# Patient Record
Sex: MALE | Race: WHITE | NOT HISPANIC OR LATINO | Employment: FULL TIME | URBAN - METROPOLITAN AREA
[De-identification: names, ages, dates, MRNs, and addresses within clinical notes are randomized per-mention and may not be internally consistent; named-entity substitution may affect disease eponyms.]

---

## 2017-04-04 NOTE — TELEPHONE ENCOUNTER
Received refill req for Simvastatin 40 mg QD. Per last OV note, pt was to change to Lipitor. Left msg for pt to call to clarify which med he is taking  Pc from pt. He states he is on the Lipitor and refilled it about 2 weeks ago. He will call the drug store to have them remove the Zocor from his med list but will also fax a msg to them myself.

## 2017-06-19 DIAGNOSIS — E03.9 ACQUIRED HYPOTHYROIDISM: ICD-10-CM

## 2017-06-20 NOTE — TELEPHONE ENCOUNTER
LEVOTHYROXINE 100 MCG  MCG TAB    Last Written Prescription Date: 09/20/2016  Last Quantity: 90, # refills: 3  Last Office Visit with [facility] [place] [place], [facility] or [facility] prescribing provider: 09/20/2016   Next 5 appointments (look out 90 days)     Aug 24, 2017  7:45 AM CDT   Return Visit with Jorge Luis Westbrook MD   [facility] ([facility])    [address] [place] [id number] [phone]                   TSH   Date Value Ref Range Status   09/20/2016 1.07 0.40 - 5.00 mU/L Final

## 2017-06-21 DIAGNOSIS — E78.00 HYPERCHOLESTEROLEMIA: ICD-10-CM

## 2017-06-21 RX ORDER — LEVOTHYROXINE SODIUM 100 UG/1
TABLET ORAL
Qty: 90 TABLET | Refills: 0 | Status: SHIPPED | OUTPATIENT
Start: 2017-06-21 | End: 2017-12-16

## 2017-06-21 RX ORDER — ATORVASTATIN CALCIUM 40 MG/1
40 TABLET, FILM COATED ORAL DAILY
Qty: 90 TABLET | Refills: 0 | Status: SHIPPED | OUTPATIENT
Start: 2017-06-21 | End: 2017-08-30

## 2017-08-02 ENCOUNTER — OFFICE VISIT (OUTPATIENT)
Dept: FAMILY MEDICINE | Facility: CLINIC | Age: 72
End: 2017-08-02
Payer: COMMERCIAL

## 2017-08-02 VITALS
RESPIRATION RATE: 20 BRPM | TEMPERATURE: 97.2 F | HEART RATE: 62 BPM | OXYGEN SATURATION: 94 % | HEIGHT: 67 IN | BODY MASS INDEX: 30.06 KG/M2 | DIASTOLIC BLOOD PRESSURE: 68 MMHG | SYSTOLIC BLOOD PRESSURE: 96 MMHG | WEIGHT: 191.5 LBS

## 2017-08-02 DIAGNOSIS — H61.23 BILATERAL IMPACTED CERUMEN: Primary | ICD-10-CM

## 2017-08-02 DIAGNOSIS — Z23 ENCOUNTER FOR IMMUNIZATION: ICD-10-CM

## 2017-08-02 PROCEDURE — 90670 PCV13 VACCINE IM: CPT | Performed by: FAMILY MEDICINE

## 2017-08-02 PROCEDURE — G0009 ADMIN PNEUMOCOCCAL VACCINE: HCPCS | Performed by: FAMILY MEDICINE

## 2017-08-02 PROCEDURE — 99213 OFFICE O/P EST LOW 20 MIN: CPT | Mod: 25 | Performed by: FAMILY MEDICINE

## 2017-08-02 PROCEDURE — 69210 REMOVE IMPACTED EAR WAX UNI: CPT | Performed by: FAMILY MEDICINE

## 2017-08-02 NOTE — PROGRESS NOTES
"  SUBJECTIVE:                                                    Jonathon Guerra is a 71 year old male who presents to clinic today for the following health issues:      Pt is also requesting Pneumona shot \"booster\".  Has appointment at travel clinic 8/14/17 for additional vaccines-traveling to [place].    Ear problem      Duration: 2-3 weeks    Description (location/character/radiation): LT ear felt full of fluid but as since improved. Would like ears checked.    Intensity:  mild    Accompanying signs and symptoms: Runny nose    History (similar episodes/previous evaluation): None    Precipitating or alleviating factors: None    Therapies tried and outcome: Neti-Pot   Pt has had long standing recurrent wax problems for both ears      Immunization records show that he has had PCV 23 10/28/14, above age of 67  He has not previously had PCV 13 vaccine    Problem list and histories reviewed & adjusted, as indicated.  Additional history: as documented    Labs reviewed in EPIC    Reviewed and updated as needed this visit by clinical staff     Reviewed and updated as needed this visit by Provider         ROS:  CONSTITUTIONAL:NEGATIVE for fever, chills, change in weight  ENT/MOUTH: POSITIVE for earache bilateral and hearing loss    OBJECTIVE:                                                    BP 96/68 (BP Location: Left arm, Patient Position: Chair, Cuff Size: Adult Regular)  Pulse 62  Temp 97.2  F (36.2  C) (Tympanic)  Resp 20  Ht 5' 7.24\" (1.708 m)  Wt 191 lb 8 oz (86.9 kg)  SpO2 94%  BMI 29.78 kg/m2  Body mass index is 29.78 kg/(m^2).  GENERAL APPEARANCE: healthy, alert and no distress  HENT: nose and mouth without ulcers or lesions, TM's pearly grey, normal light reflex bilateral and external ear canal occluded with wax bilateral  RESP: lungs clear to auscultation - no rales, rhonchi or wheezes    Rt ear wax removed with cerumen loop  Lt part of wax removed with cerumen loop but remainder flushed out with ear " irrigation  Small abrasion distal ear canal noted from use of cerumen loop.  No active bleeding    Diagnostic test results:  none        ASSESSMENT/PLAN:                                                        ICD-10-CM    1. Bilateral impacted cerumen H61.23 REMOVE IMPACTED CERUMEN   2. Encounter for immunization Z23 Pneumococcal vaccine 13 valent PCV13 IM (Prevnar) [73637]     ADMIN: Vaccine, Initial (34603)       Follow up with Provider - as needed  No need for repeat PCV 23 at this time, but will give vaccine PCV 13 today  Pt will keep appt with Travel clinic to see what other vaccines or medications he would need   Patient Instructions   Use generic Debrox ear drops twice weekly       Zackary Heath MD  [facility]

## 2017-08-02 NOTE — NURSING NOTE
"Chief Complaint   Patient presents with     Ear Problem       Initial BP 96/68 (BP Location: Left arm, Patient Position: Chair, Cuff Size: Adult Regular)  Pulse 62  Temp 97.2  F (36.2  C) (Tympanic)  Resp 20  Ht 5' 7.24\" (1.708 m)  Wt 191 lb 8 oz (86.9 kg)  SpO2 94%  BMI 29.78 kg/m2 Estimated body mass index is 29.78 kg/(m^2) as calculated from the following:    Height as of this encounter: 5' 7.24\" (1.708 m).    Weight as of this encounter: 191 lb 8 oz (86.9 kg).  Medication Reconciliation: complete     Princess VANESSA Andrade CMA      "

## 2017-08-02 NOTE — MR AVS SNAPSHOT
After Visit Summary   8/2/2017    Jonathon Guerra    MRN: [id number]           Patient Information     Date Of Birth          1945        Visit Information        Provider Department      8/2/2017 2:30 PM Zackary Heath MD [facility]        Today's Diagnoses     Bilateral impacted cerumen    -  1    Encounter for immunization          Care Instructions    Use generic Debrox ear drops twice weekly           Follow-ups after your visit        Your next 10 appointments already scheduled     Aug 14, 2017  3:00 PM CDT   Office Visit with MATHEW Harvey CNP   [facility] ([facility])    [address]  [address]  [facility] [address] [phone]           Bring a current list of meds and any records pertaining to this visit. For Physicals, please bring immunization records and any forms needing to be filled out. Please arrive 10 minutes early to complete paperwork.            Aug 17, 2017  8:00 AM CDT   LAB with [facility]   [facility] [place] ([facility])    [address]  [facility] [address] [phone]           Patient must bring picture ID. Patient should be prepared to give a urine specimen  Please do not eat 10-12 hours before your appointment if you are coming in fasting for labs on lipids, cholesterol, or glucose (sugar). Pregnant women should follow their Care Team instructions. Water with medications is okay. Do not drink coffee or other fluids. If you have concerns about taking  your medications, please ask at office or if scheduling via [employer], send a message by clicking on Secure Messaging, Message Your Care Team.            Aug 17, 2017  8:30 AM CDT   Ech Stress Test with [id number]   [facility] CV Echocardiography (Cardiovascular [facility] at [facility])    [address]  [address]  [facility] [address]    [phone]           1. Please bring or wear a comfortable two-piece outfit and walking shoes. 2. Stop eating 3 hours before the test. You may drink water or juice. 3. Stop all caffeine 12 hours before the test. This includes coffee, tea, soda pop, chocolate and certain medicines (such as Anacin and Excederin). Also avoid decaf coffee and tea, as these contain small amounts of caffeine. 4. No alcohol, smoking or use of other tobacco products for 12 hours before the test. 5. Refer to your provider instructions to see if you need to stop any medications (such as beta-blockers or nitrates) for this test. 6. For patients with diabetes: - If you take insulin, call your diabetes care team. Ask if you should take a   dose the morning of your test. - If you take diabetes medicine by mouth, dont take it on the morning of your test. Bring it with you to take after the test. (If you have questions, call your diabetes care team) 7. When you arrive, please tell us if: - You have diabetes. - You have taken Viagra, Cialis or Levitra in the past 48 hours. 8. For any questions that cannot be answered, please contact the ordering physician            Aug 30, 2017  8:15 AM CDT   Return Visit with Jorge Luis Westbrook MD   [facility] [place] ([address] [address] [phone]              Who to contact     If you have questions or need follow up information about today's clinic visit or your schedule please contact [facility] directly at [phone].  Normal or non-critical lab and imaging results will be communicated to you by MyChart, letter or phone within 4 business days after the clinic has received the results. If you do not hear from us within 7 days, please contact the clinic through MyChart or phone. If you have a critical or abnormal lab result, we will notify you by phone as soon as  "possible.  Submit refill requests through [employer] or call your pharmacy and they will forward the refill request to us. Please allow 3 business days for your refill to be completed.          Additional Information About Your Visit        [employer] Information     [employer] gives you secure access to your electronic health record. If you see a primary care provider, you can also send messages to your care team and make appointments. If you have questions, please call your primary care clinic.  If you do not have a primary care provider, please call [phone] and they will assist you.        Care EveryWhere ID     This is your Care EveryWhere ID. This could be used by other organizations to access your [facility] medical records  [id number]        Your Vitals Were     Pulse Temperature Respirations Height Pulse Oximetry BMI (Body Mass Index)    62 97.2  F (36.2  C) (Tympanic) 20 5' 7.24\" (1.708 m) 94% 29.78 kg/m2       Blood Pressure from Last 3 Encounters:   08/02/17 96/68   09/20/16 102/72   06/29/16 112/80    Weight from Last 3 Encounters:   08/02/17 191 lb 8 oz (86.9 kg)   09/20/16 203 lb (92.1 kg)   06/29/16 203 lb (92.1 kg)              We Performed the Following     ADMIN: Vaccine, Initial (24123)     Pneumococcal vaccine 13 valent PCV13 IM (Prevnar) [37023]     REMOVE IMPACTED CERUMEN        Primary Care Provider Office Phone # Fax #    Perez Keeley Beckett [phone]       [facility] [address] [address] [facility] [address]        Equal Access to Services     [facility] : Hadii fracisco ku hadasho Sojulietteali, waaxda luqadaha, qaybta kaalmada alexsander zavala. So [facility] [phone].    ATENCIÓN: Si habla español, tiene a schwarz disposición servicios gratuitos de asistencia lingüística. Quintoname al [phone].    We comply with applicable federal civil rights laws and [place] laws. We do not discriminate on the basis of race, color, national origin, " age, disability sex, sexual orientation or gender identity.            Thank you!     Thank you for choosing [facility]  for your care. Our goal is always to provide you with excellent care. Hearing back from our patients is one way we can continue to improve our services. Please take a few minutes to complete the written survey that you may receive in the mail after your visit with us. Thank you!             Your Updated Medication List - Protect others around you: Learn how to safely use, store and throw away your medicines at www.disposemymeds.org.          This list is accurate as of: 8/2/17  2:57 PM.  Always use your most recent med list.                   Brand Name Dispense Instructions for use Diagnosis    acetaminophen 650 MG 8 hour tablet      Take by mouth daily        ACETAMINOPHEN PM PO      Take 500 mg by mouth        aspirin 325 MG tablet      Take 325 mg by mouth daily.        atorvastatin 40 MG tablet    LIPITOR    90 tablet    Take 1 tablet (40 mg) by mouth daily    Hypercholesterolemia       cholecalciferol 1000 UNIT tablet    vitamin D     Take 1 tablet by mouth daily        ciclopirox 0.77 % cream    LOPROX          ketoconazole 2 % cream    NIZORAL    15 g    APPLY TOPICALLY 2 TIMES DAILY    Tinea corporis       levothyroxine 100 MCG tablet    SYNTHROID/LEVOTHROID    90 tablet    TAKE ONE TABLET BY MOUTH DAILY.    Acquired hypothyroidism       MELATONIN PO      Take 10 mg by mouth        metoprolol 25 MG 24 hr tablet    TOPROL-XL     1/2 tablet daily        MULTI-VITAMIN PO      Take by mouth daily        NITROSTAT SL      Place 0.4 mg under the tongue.        sotalol 80 MG tablet    BETAPACE     Take 1 tablet by mouth 2 times daily.        triamcinolone 0.5 % cream    KENALOG    30 g    Apply sparingly to affected area three times daily.    Rash

## 2017-08-02 NOTE — NURSING NOTE
Screening Questionnaire for Adult Immunization    Are you sick today?   No   Do you have allergies to medications, food, a vaccine component or latex?   No   Have you ever had a serious reaction after receiving a vaccination?   No   Do you have a long-term health problem with heart disease, lung disease, asthma, kidney disease, metabolic disease (e.g. diabetes), anemia, or other blood disorder?   Yes   Do you have cancer, leukemia, HIV/AIDS, or any other immune system problem?   No   In the past 3 months, have you taken medications that affect  your immune system, such as prednisone, other steroids, or anticancer drugs; drugs for the treatment of rheumatoid arthritis, Crohn s disease, or psoriasis; or have you had radiation treatments?   No   Have you had a seizure, or a brain or other nervous system problem?   No   During the past year, have you received a transfusion of blood or blood     products, or been given immune (gamma) globulin or antiviral drug?   No   For women: Are you pregnant or is there a chance you could become        pregnant during the next month?   No   Have you received any vaccinations in the past 4 weeks?   No     Immunization questionnaire was positive for at least one answer.  Notified Dr Heath.      MNVFC doesn't apply on this patient    Per orders of Dr. Heath, injection of PCV 13 given by Princess VANESSA Andrade. Patient instructed to remain in clinic for 15 minutes afterwards, and to report any adverse reaction to me immediately.       Screening performed by Princess VANESSA Andrade on 8/2/2017 at 3:11 PM.

## 2017-08-14 ENCOUNTER — OFFICE VISIT (OUTPATIENT)
Dept: FAMILY MEDICINE | Facility: CLINIC | Age: 72
End: 2017-08-14
Payer: COMMERCIAL

## 2017-08-14 VITALS — TEMPERATURE: 97.3 F | DIASTOLIC BLOOD PRESSURE: 72 MMHG | HEART RATE: 60 BPM | SYSTOLIC BLOOD PRESSURE: 107 MMHG

## 2017-08-14 DIAGNOSIS — Z71.84 TRAVEL ADVICE ENCOUNTER: Primary | ICD-10-CM

## 2017-08-14 DIAGNOSIS — Z23 NEED FOR VACCINATION: ICD-10-CM

## 2017-08-14 PROCEDURE — 90675 RABIES VACCINE IM: CPT | Mod: GA | Performed by: NURSE PRACTITIONER

## 2017-08-14 PROCEDURE — 99402 PREV MED CNSL INDIV APPRX 30: CPT | Mod: 25 | Performed by: NURSE PRACTITIONER

## 2017-08-14 PROCEDURE — 86708 HEPATITIS A ANTIBODY: CPT | Performed by: NURSE PRACTITIONER

## 2017-08-14 PROCEDURE — 86706 HEP B SURFACE ANTIBODY: CPT | Performed by: NURSE PRACTITIONER

## 2017-08-14 PROCEDURE — 86765 RUBEOLA ANTIBODY: CPT | Performed by: NURSE PRACTITIONER

## 2017-08-14 PROCEDURE — 86735 MUMPS ANTIBODY: CPT | Performed by: NURSE PRACTITIONER

## 2017-08-14 PROCEDURE — 36415 COLL VENOUS BLD VENIPUNCTURE: CPT | Performed by: NURSE PRACTITIONER

## 2017-08-14 PROCEDURE — 90472 IMMUNIZATION ADMIN EACH ADD: CPT | Mod: GA | Performed by: NURSE PRACTITIONER

## 2017-08-14 PROCEDURE — 90691 TYPHOID VACCINE IM: CPT | Mod: GA | Performed by: NURSE PRACTITIONER

## 2017-08-14 PROCEDURE — 90471 IMMUNIZATION ADMIN: CPT | Mod: GA | Performed by: NURSE PRACTITIONER

## 2017-08-14 PROCEDURE — 86762 RUBELLA ANTIBODY: CPT | Performed by: NURSE PRACTITIONER

## 2017-08-14 NOTE — PATIENT INSTRUCTIONS
Today August 14, 2017 you received the    Rabies Vaccine - Please return on 8/21/17 for your 2nd dose and 9/4/2017 or 09/11/2017  for your 3rd and final dose.    Typhoid - injectable. This vaccine is valid for two years.   .    These appointments can be made as a NURSE ONLY visit.    **It is very important for the vaccinations to be given on the scheduled day(s), this helps ensure you receive the full effectiveness of the vaccine.**    Please call [facility] with any questions [phone]    Thank you for visiting [facility]

## 2017-08-14 NOTE — PROGRESS NOTES
Nurse Note      Itinerary:  [place] and [place]      Departure Date: 10/06/2017       Return Date: 10/27/2017      Length of Trip 3 weeks      Reason for Travel: Tourism           Urban or rural: both      Accommodations: Hotel/Airbnb        IMMUNIZATION HISTORY  Have you received any immunizations within the past 4 weeks?  Yes  Have you ever fainted from having your blood drawn or from an injection?  No  Have you ever had a fever reaction to vaccination?  Yes  Have you ever had any bad reaction or side effect from any vaccination?  No  Have you ever had hepatitis A or B vaccine?  Yes  Do you live (or work closely) with anyone who has AIDS, an AIDS-like condition, any other immune disorder or who is on chemotherapy for cancer?  No  Do you have a family history of immunodeficiency?  No  Have you received any injection of immune globulin or any blood products during the past 12 months?  No    Patient roomed by GRACIA Aguilera Ly Guerra is a 72 year old male seen today alone for counsultation for international travel to [facility] for Tourism.  Patient will be departing in  6 week(s) and staying for   3 week(s) and  traveling friends.      Patient itinerary :  will be in the urban region of [facility], [facility] and [place] which presents risk for Dengue Fever, Chikungungya, Zika, Schistosomiasis, Japanese Encephalitis, Rabies, food borne illnesses, motor vehicle accidents and Typhoid. exposure.      Patient's activities will include sightseeing and visiting friends     Patient's country of birth is [place]    Special medical concerns: afib, reflux, coronary arteru disease  Pre-travel questionnaire was completed by patient and reviewed by provider.     Vitals: /72  Pulse 60  Temp 97.3  F (36.3  C) (Oral)  BMI= There is no height or weight on file to calculate BMI.    EXAM:  General:  Well-nourished, well-developed in no acute distress.  Appears to be stated age, interacts  appropriately and expresses understanding of information given to patient.    Current Outpatient Prescriptions   Medication Sig Dispense Refill     levothyroxine (SYNTHROID/LEVOTHROID) 100 MCG tablet TAKE ONE TABLET BY MOUTH DAILY. 90 tablet 0     atorvastatin (LIPITOR) 40 MG tablet Take 1 tablet (40 mg) by mouth daily 90 tablet 0     metoprolol (TOPROL-XL) 25 MG 24 hr tablet 1/2 tablet daily       aspirin 325 MG tablet Take 325 mg by mouth daily.       sotalol (BETAPACE) 80 MG tablet Take 1 tablet by mouth 2 times daily.       ciclopirox (LOPROX) 0.77 % cream        MELATONIN PO Take 10 mg by mouth        Diphenhydramine-APAP, sleep, (ACETAMINOPHEN PM PO) Take 500 mg by mouth       ketoconazole (NIZORAL) 2 % cream APPLY TOPICALLY 2 TIMES DAILY 15 g 0     Acetaminophen 650 MG TABS Take by mouth daily       triamcinolone (KENALOG) 0.5 % cream Apply sparingly to affected area three times daily. 30 g 5     Multiple Vitamin (MULTI-VITAMIN PO) Take by mouth daily        Nitroglycerin (NITROSTAT SL) Place 0.4 mg under the tongue.       cholecalciferol (VITAMIN D3) 1000 UNIT tablet Take 1 tablet by mouth daily        Patient Active Problem List   Diagnosis     Elevated prostate specific antigen (PSA)     Hypothyroidism     Atrial fibrillation (H)     Insomnia     Esophageal reflux     Hyperlipidemia     Impotence of organic origin     Urticaria     Arthropathy, lower leg     Advance Care Planning     Hyperlipidemia     Hypertension     Coronary artery disease     Tinea corporis     Pure hypercholesterolemia     Allergies   Allergen Reactions     Atorvastatin      Lipitor--muscle aches         Immunizations discussed include:   Hepatitis A:  Ordered/given today, risks, benefits and side effects reviewed  Hepatitis B: Declined  Not concerned about risk of disease  Influenza: vaccine is not available  Typhoid: Ordered/given today, risks, benefits and side effects reviewed  Rabies: Ordered/given today, risks, benefits and side  effects reviewed  Yellow Fever: Not indicated  Japanese Encephalitis: Not indicated  Meningococcus: Not indicated  Tetanus/Diphtheria: Up to date  Measles/Mumps/Rubella: Titers drawn  Cholera: Not needed  Polio: Up to date  Pneumococcal: Up to date  Varicella: Immune by disease history per patient report  Zostavax:  declined  HPV:  Not indicated  TB:  Low risk     Altitude Exposure on this trip: no  Past tolerance to Altitude: NA    ASSESSMENT/PLAN:    ICD-10-CM    1. Travel advice encounter Z71.89 Hepatitis Antibody A IgG     Hepatitis B Surface Antibody     Rubella Antibody IgG Quantitative     Rubeola Antibody IgG     Mumps Antibody IgG     TYPHOID VACCINE, IM     RABIES VACCINE, IM     RABIES VACCINE, IM   2. Need for vaccination Z23 Hepatitis Antibody A IgG     Hepatitis B Surface Antibody     Rubella Antibody IgG Quantitative     Rubeola Antibody IgG     Mumps Antibody IgG     TYPHOID VACCINE, IM     RABIES VACCINE, IM     RABIES VACCINE, IM     I have reviewed general recommendations for safe travel   including: food/water precautions, insect precautions, safer sex   practices given high prevalence of Zika, HIV and other STDs,   roadway safety. Educational materials and [employer] report provided.    Malaraia prophylaxis recommended: none  Symptomatic treatment for traveler's diarrhea: loperamide/diphenoxylate      Evacuation insurance advised and resources were provided to patient.    Total visit time 30 minutes  with over 50% of time spent counseling patient as detailed above.    Lorna Quiles CNP

## 2017-08-15 LAB
HAV IGG SER QL IA: REACTIVE
HBV SURFACE AB SERPL IA-ACNC: 0.82 M[IU]/ML

## 2017-08-16 DIAGNOSIS — Z71.84 TRAVEL ADVICE ENCOUNTER: ICD-10-CM

## 2017-08-16 DIAGNOSIS — Z23 NEED FOR VACCINATION: Primary | ICD-10-CM

## 2017-08-16 LAB
MEV IGG SER QL IA: 4.4 AI (ref 0–0.8)
MUV IGG SER QL IA: 5.2 AI (ref 0–0.8)
RUBV IGG SERPL IA-ACNC: 21 IU/ML

## 2017-08-17 ENCOUNTER — HOSPITAL ENCOUNTER (OUTPATIENT)
Dept: CARDIOLOGY | Facility: CLINIC | Age: 72
Discharge: HOME | End: 2017-08-17
Attending: INTERNAL MEDICINE | Admitting: INTERNAL MEDICINE
Payer: COMMERCIAL

## 2017-08-17 DIAGNOSIS — I25.10 ATHEROSCLEROSIS OF NATIVE CORONARY ARTERY OF NATIVE HEART WITHOUT ANGINA PECTORIS: ICD-10-CM

## 2017-08-17 DIAGNOSIS — E78.00 HYPERCHOLESTEROLEMIA: ICD-10-CM

## 2017-08-17 LAB
ALT SERPL W P-5'-P-CCNC: <5 U/L (ref 5–30)
CHOLEST SERPL-MCNC: 151 MG/DL
HDLC SERPL-MCNC: 50 MG/DL
LDLC SERPL CALC-MCNC: 85 MG/DL
NONHDLC SERPL-MCNC: 101 MG/DL
TRIGL SERPL-MCNC: 80 MG/DL

## 2017-08-17 PROCEDURE — 93321 DOPPLER ECHO F-UP/LMTD STD: CPT | Mod: 26 | Performed by: INTERNAL MEDICINE

## 2017-08-17 PROCEDURE — 36415 COLL VENOUS BLD VENIPUNCTURE: CPT | Performed by: INTERNAL MEDICINE

## 2017-08-17 PROCEDURE — 84460 ALANINE AMINO (ALT) (SGPT): CPT | Performed by: INTERNAL MEDICINE

## 2017-08-17 PROCEDURE — 93016 CV STRESS TEST SUPVJ ONLY: CPT | Performed by: INTERNAL MEDICINE

## 2017-08-17 PROCEDURE — 25500064 ZZH RX 255 OP 636: Performed by: INTERNAL MEDICINE

## 2017-08-17 PROCEDURE — 40000264 ECHO STRESS WITH OPTISON

## 2017-08-17 PROCEDURE — 80061 LIPID PANEL: CPT | Performed by: INTERNAL MEDICINE

## 2017-08-17 PROCEDURE — 93350 STRESS TTE ONLY: CPT | Mod: 26 | Performed by: INTERNAL MEDICINE

## 2017-08-17 PROCEDURE — 93325 DOPPLER ECHO COLOR FLOW MAPG: CPT | Mod: 26 | Performed by: INTERNAL MEDICINE

## 2017-08-17 PROCEDURE — 93018 CV STRESS TEST I&R ONLY: CPT | Performed by: INTERNAL MEDICINE

## 2017-08-17 RX ADMIN — HUMAN ALBUMIN MICROSPHERES AND PERFLUTREN 3 ML: 10; .22 INJECTION, SOLUTION INTRAVENOUS at 09:19

## 2017-08-21 ENCOUNTER — ALLIED HEALTH/NURSE VISIT (OUTPATIENT)
Dept: NURSING | Facility: CLINIC | Age: 72
End: 2017-08-21
Payer: COMMERCIAL

## 2017-08-21 DIAGNOSIS — Z71.84 TRAVEL ADVICE ENCOUNTER: ICD-10-CM

## 2017-08-21 DIAGNOSIS — Z23 NEED FOR VACCINATION: ICD-10-CM

## 2017-08-21 PROCEDURE — 90675 RABIES VACCINE IM: CPT | Mod: GA

## 2017-08-21 PROCEDURE — 90471 IMMUNIZATION ADMIN: CPT | Mod: GA

## 2017-08-21 PROCEDURE — 90472 IMMUNIZATION ADMIN EACH ADD: CPT | Mod: GA

## 2017-08-21 PROCEDURE — 99207 ZZC NO CHARGE NURSE ONLY: CPT

## 2017-08-21 PROCEDURE — 90746 HEPB VACCINE 3 DOSE ADULT IM: CPT | Mod: GA

## 2017-08-21 NOTE — NURSING NOTE
Screening Questionnaire for Adult Immunization    Are you sick today?   No   Do you have allergies to medications, food, a vaccine component or latex?   No   Have you ever had a serious reaction after receiving a vaccination?   No   Do you have a long-term health problem with heart disease, lung disease, asthma, kidney disease, metabolic disease (e.g. diabetes), anemia, or other blood disorder?   No   Do you have cancer, leukemia, HIV/AIDS, or any other immune system problem?   No   In the past 3 months, have you taken medications that affect  your immune system, such as prednisone, other steroids, or anticancer drugs; drugs for the treatment of rheumatoid arthritis, Crohn s disease, or psoriasis; or have you had radiation treatments?   No   Have you had a seizure, or a brain or other nervous system problem?   No   During the past year, have you received a transfusion of blood or blood     products, or been given immune (gamma) globulin or antiviral drug?   No   For women: Are you pregnant or is there a chance you could become        pregnant during the next month?   No   Have you received any vaccinations in the past 4 weeks?   Yes     Immunization questionnaire was positive for at least one answer.  Notified Pt completing rabies series.        Per orders of Dr. Krista Quiles, injection of Rabies #2 and Hep B #1 given by Carmelina Jones. Patient instructed to remain in clinic for 15 minutes afterwards, and to report any adverse reaction to me immediately.       Screening performed by Carmelina Jones on 8/21/2017 at 2:24 PM.

## 2017-08-21 NOTE — MR AVS SNAPSHOT
After Visit Summary   8/21/2017    Jonathon Guerra    MRN: [id number]           Patient Information     Date Of Birth          1945        Visit Information        Provider Department      8/21/2017 2:00 PM UP NURSE [facility]        Today's Diagnoses     Need for vaccination        Travel advice encounter           Follow-ups after your visit        Your next 10 appointments already scheduled     Aug 30, 2017  8:15 AM CDT   Return Visit with Jorge Luis Westbrook MD   [facility] PHYSICIANS [facility] [place] ([facility])    [address] [place] [id number] [phone]              Who to contact     If you have questions or need follow up information about today's clinic visit or your schedule please contact [facility] directly at [phone].  Normal or non-critical lab and imaging results will be communicated to you by Formabiliohart, letter or phone within 4 business days after the clinic has received the results. If you do not hear from us within 7 days, please contact the clinic through Formabiliohart or phone. If you have a critical or abnormal lab result, we will notify you by phone as soon as possible.  Submit refill requests through MasteryConnect or call your pharmacy and they will forward the refill request to us. Please allow 3 business days for your refill to be completed.          Additional Information About Your Visit        MyChart Information     MasteryConnect gives you secure access to your electronic health record. If you see a primary care provider, you can also send messages to your care team and make appointments. If you have questions, please call your primary care clinic.  If you do not have a primary care provider, please call [phone] and they will assist you.        Care EveryWhere ID     This is your Care EveryWhere ID. This could be used by other organizations to access your [facility] medical records  [id number]         Blood  Pressure from Last 3 Encounters:   08/14/17 107/72   08/02/17 96/68   09/20/16 102/72    Weight from Last 3 Encounters:   08/02/17 191 lb 8 oz (86.9 kg)   09/20/16 203 lb (92.1 kg)   06/29/16 203 lb (92.1 kg)              We Performed the Following     HEPATITIS B VACCINE,ADULT,IM     RABIES VACCINE, IM        Primary Care Provider Office Phone # Fax #    Perez Keeley Beckett [phone] [address] [facility] [address]        Equal Access to Services     [facility]: Hadii aad ku hadasho Sojulietteali, waaxda luqadaha, qaybta kaalmada adeegyada, alexsander watson . So [facility] [phone].    ATENCIÓN: Si habla español, tiene a schwarz disposición servicios gratuitos de asistencia lingüística. [facility] [phone].    We comply with applicable federal civil rights laws and [place] laws. We do not discriminate on the basis of race, color, national origin, age, disability sex, sexual orientation or gender identity.            Thank you!     Thank you for choosing [facility] NURSE  for your care. Our goal is always to provide you with excellent care. Hearing back from our patients is one way we can continue to improve our services. Please take a few minutes to complete the written survey that you may receive in the mail after your visit with us. Thank you!             Your Updated Medication List - Protect others around you: Learn how to safely use, store and throw away your medicines at www.disposemymeds.org.          This list is accurate as of: 8/21/17  2:26 PM.  Always use your most recent med list.                   Brand Name Dispense Instructions for use Diagnosis    acetaminophen 650 MG 8 hour tablet      Take by mouth daily        ACETAMINOPHEN PM PO      Take 500 mg by mouth        aspirin 325 MG tablet      Take 325 mg by mouth daily.        atorvastatin 40 MG tablet    LIPITOR    90 tablet    Take 1 tablet (40 mg) by mouth daily    Hypercholesterolemia        cholecalciferol 1000 UNIT tablet    vitamin D     Take 1 tablet by mouth daily        ciclopirox 0.77 % cream    LOPROX          ketoconazole 2 % cream    NIZORAL    15 g    APPLY TOPICALLY 2 TIMES DAILY    Tinea corporis       levothyroxine 100 MCG tablet    SYNTHROID/LEVOTHROID    90 tablet    TAKE ONE TABLET BY MOUTH DAILY.    Acquired hypothyroidism       MELATONIN PO      Take 10 mg by mouth        metoprolol 25 MG 24 hr tablet    TOPROL-XL     1/2 tablet daily        MULTI-VITAMIN PO      Take by mouth daily        NITROSTAT SL      Place 0.4 mg under the tongue.        sotalol 80 MG tablet    BETAPACE     Take 1 tablet by mouth 2 times daily.        triamcinolone 0.5 % cream    KENALOG    30 g    Apply sparingly to affected area three times daily.    Rash

## 2017-08-30 ENCOUNTER — OFFICE VISIT (OUTPATIENT)
Dept: CARDIOLOGY | Facility: CLINIC | Age: 72
End: 2017-08-30
Attending: INTERNAL MEDICINE
Payer: COMMERCIAL

## 2017-08-30 VITALS
WEIGHT: 193 LBS | HEART RATE: 68 BPM | HEIGHT: 67 IN | SYSTOLIC BLOOD PRESSURE: 118 MMHG | DIASTOLIC BLOOD PRESSURE: 70 MMHG | BODY MASS INDEX: 30.29 KG/M2

## 2017-08-30 DIAGNOSIS — I25.10 ATHEROSCLEROSIS OF NATIVE CORONARY ARTERY OF NATIVE HEART WITHOUT ANGINA PECTORIS: ICD-10-CM

## 2017-08-30 DIAGNOSIS — E78.00 HYPERCHOLESTEROLEMIA: ICD-10-CM

## 2017-08-30 DIAGNOSIS — I48.0 PAROXYSMAL ATRIAL FIBRILLATION (H): Primary | ICD-10-CM

## 2017-08-30 PROCEDURE — 99214 OFFICE O/P EST MOD 30 MIN: CPT | Performed by: INTERNAL MEDICINE

## 2017-08-30 RX ORDER — ROSUVASTATIN CALCIUM 40 MG/1
40 TABLET, COATED ORAL DAILY
Qty: 90 TABLET | Refills: 3 | Status: SHIPPED | OUTPATIENT
Start: 2017-08-30 | End: 2018-10-18

## 2017-08-30 NOTE — LETTER
8/30/2017    RINKU TONEY MD  [address]  [facility] [address]    RE: Jonathon Ly Fatoujuan pablo       Dear Colleague,    I had the opportunity to see Mr. Jonathon Guerra is in Cardiology Clinic today at the [facility] in [place] for reevaluation of nonocclusive coronary artery disease and paroxysmal atrial fibrillation.  Mr. uGerra has had a CT coronary angiogram done back in 2006 which demonstrated moderate diffuse coronary artery disease.  Fortunately, we have done regular stress testing and he has not had any evidence of ischemia.  He has not had any typical symptoms of angina.        In 2009, he developed atrial fibrillation.  Initially he required several cardioversions until he got started on sotalol and for the last several years he has not had any recurrences of his atrial fibrillation.  He has been managing that issue through a cardiologist at the [facility] and was not started on anticoagulation.  For stroke prevention, he takes only aspirin.  Age is his only risk factor for stroke.      He continues to do very well this year.  He has noticed no recurrences of atrial fibrillation and no chest discomfort symptoms.  He has been tolerating the 40 mg of Lipitor that we started last year without difficulty.  He had a muscle pains on the higher dose of 80 mg a day.  Unfortunately, his cholesterol numbers are not optimal.  His LDL was 85, HDL 50, triglycerides 80.        PHYSICAL EXAMINATION:  His blood pressure is 118/70, heart rate 68 and weight 193 pounds.  His lungs are clear.  His heart rhythm is regular.  He has no cardiac murmurs or carotid bruits.     Outpatient Encounter Prescriptions as of 8/30/2017   Medication Sig Dispense Refill     rosuvastatin (CRESTOR) 40 MG tablet Take 1 tablet (40 mg) by mouth daily 90 tablet 3     levothyroxine (SYNTHROID/LEVOTHROID) 100 MCG tablet TAKE ONE TABLET BY MOUTH DAILY. 90 tablet 0     ciclopirox (LOPROX) 0.77 % cream as needed        MELATONIN  PO Take 10 mg by mouth At Bedtime        Diphenhydramine-APAP, sleep, (ACETAMINOPHEN PM PO) Take 500 mg by mouth       ketoconazole (NIZORAL) 2 % cream APPLY TOPICALLY 2 TIMES DAILY 15 g 0     metoprolol (TOPROL-XL) 25 MG 24 hr tablet 1/2 tablet daily       Acetaminophen 650 MG TABS Take by mouth daily       triamcinolone (KENALOG) 0.5 % cream Apply sparingly to affected area three times daily. 30 g 5     aspirin 325 MG tablet Take 325 mg by mouth daily.       Multiple Vitamin (MULTI-VITAMIN PO) Take by mouth daily        Nitroglycerin (NITROSTAT SL) Place 0.4 mg under the tongue.       sotalol (BETAPACE) 80 MG tablet Take 1 tablet by mouth 2 times daily.       cholecalciferol (VITAMIN D3) 1000 UNIT tablet Take 1 tablet by mouth daily        [DISCONTINUED] atorvastatin (LIPITOR) 40 MG tablet Take 1 tablet (40 mg) by mouth daily 90 tablet 0     No facility-administered encounter medications on file as of 8/30/2017.       IMPRESSIONS:  Mr. Jonathon Guerra is a 72-year-old gentleman with dyslipidemia and coronary artery disease which continues to be nonocclusive.  He had a stress echocardiogram this year which was again normal.  His cholesterol numbers are not quite optimal and I suggested that we could try switching to Crestor 40 mg a day and see if we can improve his cholesterol numbers a bit more.  He is agreeable to that.  If he does not tolerate the higher dose Crestor, we can certainly go back to have 40 mg of Lipitor, which he seems to tolerate well.      He has paroxysmal atrial fibrillation as well but has not experienced any recurrences of atrial fibrillation that he has recognized in the last several years.  He manages that issue the [facility] and they have not started him on anticoagulation.  His CHADS-VASc score is 1, basically just for age.      He is planning a trip to [facility] in [facility] in October.  I do not have any concerns about him going on that trip from a cardiac standpoint and I hope  that he has a good time.      Again, thank you for allowing me to participate in the care of your patient.      Sincerely,    GEORGE GUERRERO MD     [facility]

## 2017-08-30 NOTE — PROGRESS NOTES
HPI and Plan:   See dictation    Orders Placed This Encounter   Procedures     Lipid Profile     Follow-Up with Cardiologist     Exercise Stress Echocardiogram       Orders Placed This Encounter   Medications     rosuvastatin (CRESTOR) 40 MG tablet     Sig: Take 1 tablet (40 mg) by mouth daily     Dispense:  90 tablet     Refill:  3       Medications Discontinued During This Encounter   Medication Reason     atorvastatin (LIPITOR) 40 MG tablet          Encounter Diagnoses   Name Primary?     Paroxysmal atrial fibrillation (H) Yes     Hypercholesterolemia      Atherosclerosis of native coronary artery of native heart without angina pectoris        CURRENT MEDICATIONS:  Current Outpatient Prescriptions   Medication Sig Dispense Refill     rosuvastatin (CRESTOR) 40 MG tablet Take 1 tablet (40 mg) by mouth daily 90 tablet 3     levothyroxine (SYNTHROID/LEVOTHROID) 100 MCG tablet TAKE ONE TABLET BY MOUTH DAILY. 90 tablet 0     ciclopirox (LOPROX) 0.77 % cream as needed        MELATONIN PO Take 10 mg by mouth At Bedtime        Diphenhydramine-APAP, sleep, (ACETAMINOPHEN PM PO) Take 500 mg by mouth       ketoconazole (NIZORAL) 2 % cream APPLY TOPICALLY 2 TIMES DAILY 15 g 0     metoprolol (TOPROL-XL) 25 MG 24 hr tablet 1/2 tablet daily       Acetaminophen 650 MG TABS Take by mouth daily       triamcinolone (KENALOG) 0.5 % cream Apply sparingly to affected area three times daily. 30 g 5     aspirin 325 MG tablet Take 325 mg by mouth daily.       Multiple Vitamin (MULTI-VITAMIN PO) Take by mouth daily        Nitroglycerin (NITROSTAT SL) Place 0.4 mg under the tongue.       sotalol (BETAPACE) 80 MG tablet Take 1 tablet by mouth 2 times daily.       cholecalciferol (VITAMIN D3) 1000 UNIT tablet Take 1 tablet by mouth daily          ALLERGIES     Allergies   Allergen Reactions     Atorvastatin      Lipitor--muscle aches       PAST MEDICAL HISTORY:  Past Medical History:   Diagnosis Date     Atrial fibrillation (H)      Coronary  artery disease      Hyperlipidemia      Hypertension      Thyroid disease        PAST SURGICAL HISTORY:  Past Surgical History:   Procedure Laterality Date     HERNIA REPAIR, INGUINAL RT/LT       OTHER SURGICAL HISTORY      upper and lower partial plate       FAMILY HISTORY:  Family History   Problem Relation Age of Onset     CANCER Father      prostate     CANCER Brother      Other - See Comments Mother      old age       SOCIAL HISTORY:  Social History     Social History     Marital status: Single     Spouse name: N/A     Number of children: N/A     Years of education: N/A     Social History Main Topics     Smoking status: Former Smoker     Start date: 1/30/1966     Quit date: 11/30/1992     Smokeless tobacco: Never Used     Alcohol use No     Drug use: No     Sexual activity: Yes     Partners: Male     Other Topics Concern     Parent/Sibling W/ Cabg, Mi Or Angioplasty Before 65f 55m? No     Caffeine Concern No     Sleep Concern No     Special Diet No     Exercise Yes     running or walking      Seat Belt No     Social History Narrative    --------------------------------------------------------------------------------    Surgical History  Return To Top     Status Surgery Time Frame Comment Record Date     Inactive  inguinal hernia      8/26/2008      Inactive  Surgery  UPPER AND LOWER PARTIAL PLATE    8/26/2008          --------------------------------------------------------------------------------    Food Allergy  Return To Top     Allergen Reaction Comment Record Date     * No known food allergies      8/26/2008          --------------------------------------------------------------------------------    Drug Allergy  Return To Top     Allergen Reaction Comment Record Date     Penicillin      5/6/2009      LIPITOR  MUSCLE ACHES    6/5/2007          --------------------------------------------------------------------------------    Environment Allergy  Return To Top     Allergen Reaction Comment Record Date      * No known environmental allergies  RASH    8/26/2008          --------------------------------------------------------------------------------    Immunization History Return To Top     Funding Source Vaccine Type of Vaccine Date Given Route Site Given Lot#  Exp. Date Date on VIS Date Given VIS Vaccinator Note       Hepatitis A #1 (Adult)  HepA - Adult  6/6/2007  IM  Left Deltoid  [id number]  [facility]    03/21/2006 06[facility]06[facility]          Hepatitis A #2(Adult)  HepA - Adult  8/26/2008  IM  Rightt Deltoid  [id number]  [facility]  9/4/2010 03/21/2006 [facility]          Hepatitis B #1 (Adult)  HepB -Adult  6/6/2007  IM  Right Deltoid  [id number]  [facility]    07/11/2001 06/06/2007  LUIS ALFREDO [facility]          Hepatitis B #2 (Adult)  HepB -Adult  8/26/2008 8/26/2008            Herpes Zoster (Zostavax) age 60 +  Herpes Zoster  8/26/2008 8/26/2008            Herpes Zoster (Zostavax) age 60 +  Herpes Zoster  9/2/08  SQ  Right Deltoid  [id number]  MRK  7/25/09 9/11/06 9/2/08  [facility]        Private; Private  Influenza (Adult) Seasonal  Flu (>= 3yrs)  9/23/2009  IM; IM  Left Deltoid; Left Deltoid  [id number]  SP; SP  06/30/2010 8/11/2009 9/23/2009  NEISHA Solorzano LPN            --------------------------------------------------------------------------------    Social History  Return To Top     Question Answer Comment Record Date     Advance Directive or Living Will  Form Given to Patient    8/26/2008      Emotional Abuse  No    1/20/2012      Exercise  Yes    8/26/2008      Physical Abuse  No    1/20/2012      Sexual Abuse  No    1/20/2012      Tobacco history  Has never smoked or chewed tobacco    8/26/2008      Alcohol history  Never drinks alcohol    8/26/2008      Has the patient ever used illegal drugs?  Has never used illegal drugs    1/20/2012          --------------------------------------------------------------------------------    History - Overall Remark: 1/20/2012 Return To Top      MEDICATIONS (including any changes made today):    1. Fish Oil 1200 mg, 1 p.o. twice daily    2. Niacin 500 mg Tablet, 1 twice daily    3. Vitamin B Complex Tablet, 1 p.o. daily    4. Levothyroxine 125 mcg Tablet, 1 p.o. daily    5. Aspirin 325 mg Tablet, 1 p.o. daily    6. Red Yeast Rice Extract 600 mg Capsule, 1200mg po BID    7. Nitroglycerin 0.4 mg Tablet, Sublingual, as directed for chest pain    8. Toprol XL 25 mg Tablet Sustained Release 24 hr, 1 p.o. twice daily    9. Centrum Silver Tablet, 1 p.o. daily    10. Calcium And Magnesium 750-465mg Tablet, 1 p.o. daily    11. Testosterone 50 mg/mI, Pt use 90mg    12. Tikosyn 250 mcg Capsule, 2 p.o. twice daily    13. Trazodone 50mg Tablet, 2 po at HS daily PRN sleep    --------------------------------------------------------------------------------    Medical History Return To Top     Status Diagnosis Time Frame Comment Record Date     Active (466.0) - C - Bronchitis, acute      1/20/2012      Active (790.93) - C - Elevated PSA      6/23/2011      Active (V74.5) - C - Screening, STD      6/23/2011      Active (244.9) - C - Hypothyroidism      10/22/2010      Active (V76.44) - C - Screening, prostate      8/26/2010      Active (380.4) - C - Ceruminosis      8/26/2010      Active (427.31) - C - Atrial fibrillation      8/26/2010      Active 780.52 Insomnia      5/26/2009      Active (427.31) - C - Atrial fibrillation      5/26/2009      Active (530.81) - C - GERD      5/6/2009      Active 427.31 Atrial fibrillation      5/4/2009      Active 719.44 Hand pain      1/22/2009      Active (719.44) - C - Hand pain      1/21/2009      Active 780.79 Fatigue      9/2/2008      Active V05.8 Immunization, other, specified      9/2/2008      Active (272.4) - C - Hyperlipidemia      8/26/2008      Active (V05.8) - C - Immunization, other, specified      8/26/2008      Active (607.84) - C - Erectile Dysfunction      8/26/2008      Active 708.9 UNSP URTICARIA      6/6/2007       "Active V05.3 Hepatitis vaccination      6/6/2007      Active 716.96 ARTHROPATHY UNSP LEG      6/6/2007      Active (V58.69) - C - Other medication management      5/29/2007      Active (V70.0) - C - Routine medical exam      5/29/2007      Active (V76.51) - C - Screening, colon      5/29/2007      Inactive  (427.31) - C - Atrial fibrillation    GOOD CONTROL NOW CONVERTED LAST THURSDAY 7/22/2010      Inactive  (V04.81) - C - Influenza vaccination      9/23/2009      Inactive  (427.31) - C - Atrial fibrillation      5/6/2009      Inactive  380.4 Ceruminosis    LEFT EAR  4/8/2009      Inactive  V74.5 Screening, STD      4/8/2009      Inactive  466.0 Bronchitis, acute      4/8/2009                 Review of Systems:  Skin:  Positive for (fungal infection toes) rash (skin growths negative)     Eyes:  Positive for glasses    ENT:  Positive for tinnitus;vertigo    Respiratory:  Positive for sleep apnea (no c-pap use)     Cardiovascular:    Positive for;fatigue    Gastroenterology: Negative      Genitourinary:  Positive for nocturia    Musculoskeletal:  Positive for arthritis (thumbs)    Neurologic:  Negative      Psychiatric:  Negative      Heme/Lymph/Imm:  Positive for allergies    Endocrine:  Positive for thyroid disorder      Physical Exam:  Vitals: /70  Pulse 68  Ht 1.708 m (5' 7.25\")  Wt 87.5 kg (193 lb)  BMI 30 kg/m2    Constitutional:  cooperative, alert and oriented, well developed, well nourished, in no acute distress        Skin:  warm and dry to the touch, no apparent skin lesions or masses noted        Head:  normocephalic, no masses or lesions        Eyes:           ENT:  no pallor or cyanosis, dentition good        Neck:  carotid pulses are full and equal bilaterally, JVP normal, no carotid bruit, no thyromegaly        Chest:  normal breath sounds, clear to auscultation, normal A-P diameter, normal symmetry, normal respiratory excursion, no use of accessory muscles          Cardiac: regular rhythm, " normal S1/S2, no S3 or S4, apical impulse not displaced, no murmurs, gallops or rubs                  Abdomen:           Vascular: pulses full and equal, no bruits auscultated                                        Extremities and Back:  no deformities, clubbing, cyanosis, erythema observed;no edema              Neurological:  no gross motor deficits;affect appropriate, oriented to time, person and place              CC  Jorge Luis Westbrook MD  [address]  [place] [id number]

## 2017-08-30 NOTE — MR AVS SNAPSHOT
After Visit Summary   8/30/2017    Jonathon Guerra    MRN: [id number]           Patient Information     Date Of Birth          1945        Visit Information        Provider Department      8/30/2017 8:15 AM Jorge Luis Westbrook MD [facility]        Today's Diagnoses     Paroxysmal atrial fibrillation (H)    -  1    Hypercholesterolemia        Atherosclerosis of native coronary artery of native heart without angina pectoris           Follow-ups after your visit        Additional Services     Follow-Up with Cardiologist                 Future tests that were ordered for you today     Open Future Orders        Priority Expected Expires Ordered    Lipid Profile Routine 8/30/2018 8/31/2018 8/30/2017    Exercise Stress Echocardiogram Routine 8/30/2018 8/31/2018 8/30/2017    Follow-Up with Cardiologist Routine 8/30/2018 8/31/2018 8/30/2017            Who to contact     If you have questions or need follow up information about today's clinic visit or your schedule please contact [facility] directly at [phone].  Normal or non-critical lab and imaging results will be communicated to you by Key[employer] Partnerhart, letter or phone within 4 business days after the clinic has received the results. If you do not hear from us within 7 days, please contact the clinic through [employer] or phone. If you have a critical or abnormal lab result, we will notify you by phone as soon as possible.  Submit refill requests through [employer] or call your pharmacy and they will forward the refill request to us. Please allow 3 business days for your refill to be completed.          Additional Information About Your Visit        [employer] Partner[employer] Information     Nodeable gives you secure access to your electronic health record. If you see a primary care provider, you can also send messages to your care team and make appointments. If you have questions, please call your  "primary care clinic.  If you do not have a primary care provider, please call [phone] and they will assist you.        Care EveryWhere ID     This is your Care EveryWhere ID. This could be used by other organizations to access your [place] medical records  [id number]        Your Vitals Were     Pulse Height BMI (Body Mass Index)             68 1.708 m (5' 7.25\") 30 kg/m2          Blood Pressure from Last 3 Encounters:   08/30/17 118/70   08/14/17 107/72   08/02/17 96/68    Weight from Last 3 Encounters:   08/30/17 87.5 kg (193 lb)   08/02/17 86.9 kg (191 lb 8 oz)   09/20/16 92.1 kg (203 lb)              We Performed the Following     Follow-Up with Cardiologist          Today's Medication Changes          These changes are accurate as of: 8/30/17  8:56 AM.  If you have any questions, ask your nurse or doctor.               Start taking these medicines.        Dose/Directions    rosuvastatin 40 MG tablet   Commonly known as:  CRESTOR   Used for:  Hypercholesterolemia   Started by:  Jorge Luis Westbrook MD        Dose:  40 mg   Take 1 tablet (40 mg) by mouth daily   Quantity:  90 tablet   Refills:  3         Stop taking these medicines if you haven't already. Please contact your care team if you have questions.     atorvastatin 40 MG tablet   Commonly known as:  LIPITOR   Stopped by:  Jorge Luis Westbrook MD                Where to get your medicines      These medications were sent to [address] [address]     Phone:  [phone]     rosuvastatin 40 MG tablet                Primary Care Provider Office Phone # Fax #    Perez Keeley Beckett [phone]       [address] [facility] [address]        Equal Access to Services     JESSICA GARCIA : Jim Cardozo, waaxda luzakiya, qaybta kaalmaalexsander yan. So [facility] [phone].    ATENCIÓN: Si raj israel " a schwarz disposición servicios gratuitos de asistencia lingüística. Ramirez bethea [phone].    We comply with applicable federal civil rights laws and [place] laws. We do not discriminate on the basis of race, color, national origin, age, disability sex, sexual orientation or gender identity.            Thank you!     Thank you for choosing [facility]  for your care. Our goal is always to provide you with excellent care. Hearing back from our patients is one way we can continue to improve our services. Please take a few minutes to complete the written survey that you may receive in the mail after your visit with us. Thank you!             Your Updated Medication List - Protect others around you: Learn how to safely use, store and throw away your medicines at www.disposemymeds.org.          This list is accurate as of: 8/30/17  8:56 AM.  Always use your most recent med list.                   Brand Name Dispense Instructions for use Diagnosis    acetaminophen 650 MG 8 hour tablet      Take by mouth daily        ACETAMINOPHEN PM PO      Take 500 mg by mouth        aspirin 325 MG tablet      Take 325 mg by mouth daily.        cholecalciferol 1000 UNIT tablet    vitamin D     Take 1 tablet by mouth daily        ciclopirox 0.77 % cream    LOPROX     as needed        ketoconazole 2 % cream    NIZORAL    15 g    APPLY TOPICALLY 2 TIMES DAILY    Tinea corporis       levothyroxine 100 MCG tablet    SYNTHROID/LEVOTHROID    90 tablet    TAKE ONE TABLET BY MOUTH DAILY.    Acquired hypothyroidism       MELATONIN PO      Take 10 mg by mouth At Bedtime        metoprolol 25 MG 24 hr tablet    TOPROL-XL     1/2 tablet daily        MULTI-VITAMIN PO      Take by mouth daily        NITROSTAT SL      Place 0.4 mg under the tongue.        rosuvastatin 40 MG tablet    CRESTOR    90 tablet    Take 1 tablet (40 mg) by mouth daily    Hypercholesterolemia       sotalol 80 MG tablet    BETAPACE     Take 1  tablet by mouth 2 times daily.        triamcinolone 0.5 % cream    KENALOG    30 g    Apply sparingly to affected area three times daily.    Rash

## 2017-08-30 NOTE — PROGRESS NOTES
HISTORY OF PRESENT ILLNESS:  I had the opportunity to see Mr. Jonathon Guerra is in Cardiology Clinic today at the [facility] in [place] for reevaluation of nonocclusive coronary artery disease and paroxysmal atrial fibrillation.  Mr. Guerra has had a CT coronary angiogram done back in 2006 which demonstrated moderate diffuse coronary artery disease.  Fortunately, we have done regular stress testing and he has not had any evidence of ischemia.  He has not had any typical symptoms of angina.        In 2009, he developed atrial fibrillation.  Initially he required several cardioversions until he got started on sotalol and for the last several years he has not had any recurrences of his atrial fibrillation.  He has been managing that issue through a cardiologist at the [facility] and was not started on anticoagulation.  For stroke prevention, he takes only aspirin.  Age is his only risk factor for stroke.      He continues to do very well this year.  He has noticed no recurrences of atrial fibrillation and no chest discomfort symptoms.  He has been tolerating the 40 mg of Lipitor that we started last year without difficulty.  He had a muscle pains on the higher dose of 80 mg a day.  Unfortunately, his cholesterol numbers are not optimal.  His LDL was 85, HDL 50, triglycerides 80.        PHYSICAL EXAMINATION:  His blood pressure is 118/70, heart rate 68 and weight 193 pounds.  His lungs are clear.  His heart rhythm is regular.  He has no cardiac murmurs or carotid bruits.      IMPRESSIONS:  Mr. Jonathon Guerra is a 72-year-old gentleman with dyslipidemia and coronary artery disease which continues to be nonocclusive.  He had a stress echocardiogram this year which was again normal.  His cholesterol numbers are not quite optimal and I suggested that we could try switching to Crestor 40 mg a day and see if we can improve his cholesterol numbers a bit more.  He is agreeable to that.  If he does not tolerate  the higher dose Crestor, we can certainly go back to have 40 mg of Lipitor, which he seems to tolerate well.      He has paroxysmal atrial fibrillation as well but has not experienced any recurrences of atrial fibrillation that he has recognized in the last several years.  He manages that issue the [facility] and they have not started him on anticoagulation.  His CHADS-VASc score is 1, basically just for age.      He is planning a trip to [facility] in [facility] in October.  I do not have any concerns about him going on that trip from a cardiac standpoint and I hope that he has a good time.      cc:      Perez Beckett MD    [facility]   [address] [place] [address]         GEORGE GUERRERO MD, [facility]             D: 2017 09:04   T: 2017 09:43   MT: RUMA      Name:     ANGELIA YOUNG   MRN:      [id number]        Account:      [id number]   :      1945           Service Date: 2017      Document: [id number]

## 2017-09-13 ENCOUNTER — ALLIED HEALTH/NURSE VISIT (OUTPATIENT)
Dept: NURSING | Facility: CLINIC | Age: 72
End: 2017-09-13
Payer: COMMERCIAL

## 2017-09-13 DIAGNOSIS — Z23 NEED FOR RABIES VACCINATION: Primary | ICD-10-CM

## 2017-09-13 PROCEDURE — 90675 RABIES VACCINE IM: CPT | Mod: GA

## 2017-09-13 PROCEDURE — 99207 ZZC NO CHARGE NURSE ONLY: CPT

## 2017-09-13 PROCEDURE — 90471 IMMUNIZATION ADMIN: CPT | Mod: GA

## 2017-10-04 ENCOUNTER — OFFICE VISIT (OUTPATIENT)
Dept: NURSING | Facility: CLINIC | Age: 72
End: 2017-10-04
Payer: COMMERCIAL

## 2017-10-04 DIAGNOSIS — Z23 NEED FOR PROPHYLACTIC VACCINATION AND INOCULATION AGAINST INFLUENZA: Primary | ICD-10-CM

## 2017-10-04 PROCEDURE — 90662 IIV NO PRSV INCREASED AG IM: CPT

## 2017-10-04 PROCEDURE — G0008 ADMIN INFLUENZA VIRUS VAC: HCPCS

## 2017-10-04 PROCEDURE — 99207 ZZC NO CHARGE NURSE ONLY: CPT

## 2017-10-04 NOTE — MR AVS SNAPSHOT
After Visit Summary   10/4/2017    Jonathon Guerra    MRN: [id number]           Patient Information     Date Of Birth          1945        Visit Information        Provider Department      10/4/2017 2:00 PM BM NURSE [facility]        Today's Diagnoses     Need for prophylactic vaccination and inoculation against influenza    -  1       Follow-ups after your visit        Your next 10 appointments already scheduled     Nov 21, 2017  7:30 AM CST   PHYSICAL with Perez Beckett MD   [facility]    [address] [facility]  [address] [place] [id number] [phone]              Who to contact     If you have questions or need follow up information about today's clinic visit or your schedule please contact [facility] directly at [phone].  Normal or non-critical lab and imaging results will be communicated to you by Rapid Diagnostekhart, letter or phone within 4 business days after the clinic has received the results. If you do not hear from us within 7 days, please contact the clinic through [employer] or phone. If you have a critical or abnormal lab result, we will notify you by phone as soon as possible.  Submit refill requests through [employer] or call your pharmacy and they will forward the refill request to us. Please allow 3 business days for your refill to be completed.          Additional Information About Your Visit        MyChart Information     [employer] gives you secure access to your electronic health record. If you see a primary care provider, you can also send messages to your care team and make appointments. If you have questions, please call your primary care clinic.  If you do not have a primary care provider, please call [phone] and they will assist you.        Care EveryWhere ID     This is your Care EveryWhere ID. This could be  used by other organizations to access your [place] medical records  [id number]         Blood Pressure from Last 3 Encounters:   08/30/17 118/70   08/14/17 107/72   08/02/17 96/68    Weight from Last 3 Encounters:   08/30/17 193 lb (87.5 kg)   08/02/17 191 lb 8 oz (86.9 kg)   09/20/16 203 lb (92.1 kg)              We Performed the Following     ADMIN INFLUENZA (For MEDICARE Patients ONLY) []     FLU VACCINE, INCREASED ANTIGEN, PRESV FREE, AGE 65+ [[address]]        Primary Care Provider Office Phone # Fax #    Perez Keeley Beckett [phone] [address] [facility] [address]        Equal Access to Services     JESSICA GARCIA : Hadii fracisco garnica hadasho Soomaali, waaxda luqadaha, qaybta kaalmada adeegyada, alexsander watson . So [facility] [place] [phone].    ATENCIÓN: Si habla español, tiene a schwarz disposición servicios gratuitos de asistencia lingüística. Llame al [phone].    We comply with applicable federal civil rights laws and [place] laws. We do not discriminate on the basis of race, color, national origin, age, disability, sex, sexual orientation, or gender identity.            Thank you!     Thank you for choosing [facility]  for your care. Our goal is always to provide you with excellent care. Hearing back from our patients is one way we can continue to improve our services. Please take a few minutes to complete the written survey that you may receive in the mail after your visit with us. Thank you!             Your Updated Medication List - Protect others around you: Learn how to safely use, store and throw away your medicines at www.disposemymeds.org.          This list is accurate as of: 10/4/17  2:06 PM.  Always use your most recent med list.                   Brand Name Dispense Instructions for use Diagnosis    acetaminophen 650 MG 8 hour tablet      Take by mouth daily        ACETAMINOPHEN PM PO      Take 500 mg by  mouth        aspirin 325 MG tablet      Take 325 mg by mouth daily.        cholecalciferol 1000 UNIT tablet    vitamin D     Take 1 tablet by mouth daily        ciclopirox 0.77 % cream    LOPROX     as needed        ketoconazole 2 % cream    NIZORAL    15 g    APPLY TOPICALLY 2 TIMES DAILY    Tinea corporis       levothyroxine 100 MCG tablet    SYNTHROID/LEVOTHROID    90 tablet    TAKE ONE TABLET BY MOUTH DAILY.    Acquired hypothyroidism       MELATONIN PO      Take 10 mg by mouth At Bedtime        metoprolol 25 MG 24 hr tablet    TOPROL-XL     1/2 tablet daily        MULTI-VITAMIN PO      Take by mouth daily        NITROSTAT SL      Place 0.4 mg under the tongue.        rosuvastatin 40 MG tablet    CRESTOR    90 tablet    Take 1 tablet (40 mg) by mouth daily    Hypercholesterolemia       sotalol 80 MG tablet    BETAPACE     Take 1 tablet by mouth 2 times daily.        triamcinolone 0.5 % cream    KENALOG    30 g    Apply sparingly to affected area three times daily.    Rash

## 2017-11-21 ENCOUNTER — OFFICE VISIT (OUTPATIENT)
Dept: FAMILY MEDICINE | Facility: CLINIC | Age: 72
End: 2017-11-21
Payer: COMMERCIAL

## 2017-11-21 VITALS
DIASTOLIC BLOOD PRESSURE: 60 MMHG | OXYGEN SATURATION: 97 % | TEMPERATURE: 96.3 F | BODY MASS INDEX: 28.87 KG/M2 | WEIGHT: 190.5 LBS | RESPIRATION RATE: 16 BRPM | HEART RATE: 69 BPM | SYSTOLIC BLOOD PRESSURE: 102 MMHG | HEIGHT: 68 IN

## 2017-11-21 DIAGNOSIS — Z23 NEED FOR VACCINATION: ICD-10-CM

## 2017-11-21 DIAGNOSIS — E78.5 HYPERLIPIDEMIA, UNSPECIFIED HYPERLIPIDEMIA TYPE: ICD-10-CM

## 2017-11-21 DIAGNOSIS — I25.10 CORONARY ARTERY DISEASE INVOLVING NATIVE CORONARY ARTERY OF NATIVE HEART WITHOUT ANGINA PECTORIS: ICD-10-CM

## 2017-11-21 DIAGNOSIS — N52.9 IMPOTENCE OF ORGANIC ORIGIN: ICD-10-CM

## 2017-11-21 DIAGNOSIS — Z00.00 ROUTINE HISTORY AND PHYSICAL EXAMINATION OF ADULT: Primary | ICD-10-CM

## 2017-11-21 DIAGNOSIS — N39.43 DRIBBLING: ICD-10-CM

## 2017-11-21 DIAGNOSIS — Z12.5 SPECIAL SCREENING FOR MALIGNANT NEOPLASM OF PROSTATE: ICD-10-CM

## 2017-11-21 DIAGNOSIS — E78.00 PURE HYPERCHOLESTEROLEMIA: ICD-10-CM

## 2017-11-21 DIAGNOSIS — R97.20 ELEVATED PROSTATE SPECIFIC ANTIGEN (PSA): ICD-10-CM

## 2017-11-21 DIAGNOSIS — H81.11 BENIGN PAROXYSMAL POSITIONAL VERTIGO, RIGHT: ICD-10-CM

## 2017-11-21 DIAGNOSIS — K21.9 GASTROESOPHAGEAL REFLUX DISEASE WITHOUT ESOPHAGITIS: ICD-10-CM

## 2017-11-21 DIAGNOSIS — Z00.01 ENCOUNTER FOR ROUTINE ADULT MEDICAL EXAM WITH ABNORMAL FINDINGS: ICD-10-CM

## 2017-11-21 DIAGNOSIS — Z11.59 ENCOUNTER FOR HEPATITIS C SCREENING TEST FOR LOW RISK PATIENT: ICD-10-CM

## 2017-11-21 DIAGNOSIS — I48.20 CHRONIC ATRIAL FIBRILLATION (H): ICD-10-CM

## 2017-11-21 PROBLEM — G47.33 OSA (OBSTRUCTIVE SLEEP APNEA): Chronic | Status: ACTIVE | Noted: 2017-11-21

## 2017-11-21 LAB
HCV AB SERPL QL IA: ABNORMAL
PSA SERPL-ACNC: 3.46 UG/L (ref 0–4)

## 2017-11-21 PROCEDURE — 86803 HEPATITIS C AB TEST: CPT | Performed by: FAMILY MEDICINE

## 2017-11-21 PROCEDURE — 84153 ASSAY OF PSA TOTAL: CPT | Performed by: FAMILY MEDICINE

## 2017-11-21 PROCEDURE — 99207 ZZC FOOT EXAM  NO CHARGE: CPT | Mod: 25 | Performed by: FAMILY MEDICINE

## 2017-11-21 PROCEDURE — 99397 PER PM REEVAL EST PAT 65+ YR: CPT | Mod: 25 | Performed by: FAMILY MEDICINE

## 2017-11-21 PROCEDURE — 36415 COLL VENOUS BLD VENIPUNCTURE: CPT | Performed by: FAMILY MEDICINE

## 2017-11-21 PROCEDURE — 90746 HEPB VACCINE 3 DOSE ADULT IM: CPT | Performed by: FAMILY MEDICINE

## 2017-11-21 PROCEDURE — G0010 ADMIN HEPATITIS B VACCINE: HCPCS | Performed by: FAMILY MEDICINE

## 2017-11-21 NOTE — NURSING NOTE
"Chief Complaint   Patient presents with     Wellness Visit       Initial /60  Pulse 69  Temp 96.3  F (35.7  C) (Tympanic)  Resp 16  Ht 5' 7.5\" (1.715 m)  Wt 190 lb 8 oz (86.4 kg)  SpO2 97%  BMI 29.4 kg/m2 Estimated body mass index is 29.4 kg/(m^2) as calculated from the following:    Height as of this encounter: 5' 7.5\" (1.715 m).    Weight as of this encounter: 190 lb 8 oz (86.4 kg).  Medication Reconciliation: complete   Krista Tobar CMA    "

## 2017-11-21 NOTE — MR AVS SNAPSHOT
After Visit Summary   11/21/2017    Jonathon Guerra    MRN: [id number]           Patient Information     Date Of Birth          1945        Visit Information        Provider Department      11/21/2017 7:30 AM Perez Beckett MD [facility]        Today's Diagnoses     Routine history and physical examination of adult    -  1    Chronic atrial fibrillation (H)        Benign paroxysmal positional vertigo, right        Dribbling        Gastroesophageal reflux disease without esophagitis        Hyperlipidemia, unspecified hyperlipidemia type        Impotence of organic origin        Coronary artery disease involving native coronary artery of native heart without angina pectoris        Pure hypercholesterolemia        Elevated prostate specific antigen (PSA)        Encounter for routine adult medical exam with abnormal findings        Special screening for malignant neoplasm of prostate        Encounter for hepatitis C screening test for low risk patient          Care Instructions    (Z00.00) Routine history and physical examination of adult  (primary encounter diagnosis)  Comment:    Plan:      (I48.2) Chronic atrial fibrillation (H)  Comment:    Plan:      (H81.11) Benign paroxysmal positional vertigo, right  Comment:    Plan:      (N39.43) Dribbling  Comment:    Plan:      (K21.9) Gastroesophageal reflux disease without esophagitis  Comment:    Plan:      (E78.5) Hyperlipidemia, unspecified hyperlipidemia type  Comment:    Plan:      (N52.9) Impotence of organic origin  Comment:    Plan:      (I25.10) Coronary artery disease involving native coronary artery of native heart without angina pectoris  Comment:    Plan: Lipid panel reflex to direct LDL Fasting, ALT             (E78.00) Pure hypercholesterolemia  Comment:    Plan:      (R97.20) Elevated prostate specific antigen (PSA)  Comment:    Plan:      (Z00.01) Encounter for routine adult medical exam with  abnormal findings  Comment:    Plan:      (Z12.5) Special screening for malignant neoplasm of prostate  Comment:    Plan: Prostate spec antigen screen             (Z11.59) Encounter for hepatitis C screening test for low risk patient  Comment:    Plan: **Hepatitis C Screen Reflex to RNA FUTURE         anytime                            Follow-ups after your visit        Future tests that were ordered for you today     Open Standing Orders        Priority Remaining Interval Expires Ordered    Lipid panel reflex to direct LDL Fasting Routine 4/4 11 months 12/31/2017 11/21/2017    ALT Routine 4/4 11 months 12/31/2017 11/21/2017          Open Future Orders        Priority Expected Expires Ordered    **Hepatitis C Screen Reflex to RNA FUTURE anytime Routine 11/21/2017 11/21/2018 11/21/2017            Who to contact     If you have questions or need follow up information about today's clinic visit or your schedule please contact [facility] directly at [phone].  Normal or non-critical lab and imaging results will be communicated to you by URXhart, letter or phone within 4 business days after the clinic has received the results. If you do not hear from us within 7 days, please contact the clinic through URXhart or phone. If you have a critical or abnormal lab result, we will notify you by phone as soon as possible.  Submit refill requests through "[employer] or call your pharmacy and they will forward the refill request to us. Please allow 3 business days for your refill to be completed.          Additional Information About Your Visit        URXharClipmarks Information     "[employer] gives you secure access to your electronic health record. If you see a primary care provider, you can also send messages to your care team and make appointments. If you have questions, please call your primary care clinic.  If you do not have a primary care provider, please call [phone] and they will assist you.    "     Care EveryWhere ID     This is your Care EveryWhere ID. This could be used by other organizations to access your [place] medical records  [id number]        Your Vitals Were     Pulse Temperature Respirations Height Pulse Oximetry BMI (Body Mass Index)    69 96.3  F (35.7  C) (Tympanic) 16 5' 7.5\" (1.715 m) 97% 29.4 kg/m2       Blood Pressure from Last 3 Encounters:   11/21/17 102/60   08/30/17 118/70   08/14/17 107/72    Weight from Last 3 Encounters:   11/21/17 190 lb 8 oz (86.4 kg)   08/30/17 193 lb (87.5 kg)   08/02/17 191 lb 8 oz (86.9 kg)              We Performed the Following     Prostate spec antigen screen        Primary Care Provider Office Phone # Fax #    Perez Keeley Beckett [phone] [address] [facility] [address]        Equal Access to Services     [facility] : Hadii aad ku hadasho Soomaali, waaxda luqadaha, qaybta kaalmada adeegyada, waxay idiin hayaan adeeg kharash la'damienn . So [facility] [phone].    ATENCIÓN: Si habla español, tiene a schwarz disposición servicios gratuitos de asistencia lingüística. Llame al [phone].    We comply with applicable federal civil rights laws and [place] laws. We do not discriminate on the basis of race, color, national origin, age, disability, sex, sexual orientation, or gender identity.            Thank you!     Thank you for choosing [facility]  for your care. Our goal is always to provide you with excellent care. Hearing back from our patients is one way we can continue to improve our services. Please take a few minutes to complete the written survey that you may receive in the mail after your visit with us. Thank you!             Your Updated Medication List - Protect others around you: Learn how to safely use, store and throw away your medicines at www.disposemymeds.org.          This list is accurate as of: 11/21/17  8:26 AM.  Always use your most recent med list.                "    Brand Name Dispense Instructions for use Diagnosis    acetaminophen 650 MG 8 hour tablet      Take by mouth daily        ACETAMINOPHEN PM PO      Take 500 mg by mouth        aspirin 325 MG tablet      Take 325 mg by mouth daily.        cholecalciferol 1000 UNIT tablet    vitamin D3     Take 1 tablet by mouth daily        ciclopirox 0.77 % cream    LOPROX     as needed        ketoconazole 2 % cream    NIZORAL    15 g    APPLY TOPICALLY 2 TIMES DAILY    Tinea corporis       levothyroxine 100 MCG tablet    SYNTHROID/LEVOTHROID    90 tablet    TAKE ONE TABLET BY MOUTH DAILY.    Acquired hypothyroidism       MELATONIN PO      Take 10 mg by mouth At Bedtime        metoprolol 25 MG 24 hr tablet    TOPROL-XL     1/2 tablet daily        MULTI-VITAMIN PO      Take by mouth daily        NITROSTAT SL      Place 0.4 mg under the tongue.        rosuvastatin 40 MG tablet    CRESTOR    90 tablet    Take 1 tablet (40 mg) by mouth daily    Hypercholesterolemia       sotalol 80 MG tablet    BETAPACE     Take 1 tablet by mouth 2 times daily.        triamcinolone 0.5 % cream    KENALOG    30 g    Apply sparingly to affected area three times daily.    Rash

## 2017-11-21 NOTE — PROGRESS NOTES
Screening Questionnaire for Adult Immunization    Are you sick today?   No   Do you have allergies to medications, food, a vaccine component or latex?   No   Have you ever had a serious reaction after receiving a vaccination?   No   Do you have a long-term health problem with heart disease, lung disease, asthma, kidney disease, metabolic disease (e.g. diabetes), anemia, or other blood disorder?   No   Do you have cancer, leukemia, HIV/AIDS, or any other immune system problem?   No   In the past 3 months, have you taken medications that affect  your immune system, such as prednisone, other steroids, or anticancer drugs; drugs for the treatment of rheumatoid arthritis, Crohn s disease, or psoriasis; or have you had radiation treatments?   No   Have you had a seizure, or a brain or other nervous system problem?   No   During the past year, have you received a transfusion of blood or blood     products, or been given immune (gamma) globulin or antiviral drug?   No   For women: Are you pregnant or is there a chance you could become        pregnant during the next month?   No   Have you received any vaccinations in the past 4 weeks?   No     Immunization questionnaire answers were all negative.        Per orders of Dr. TONEY, injection of HEP B   given by Krista Tobar. Patient instructed to remain in clinic for 15 minutes afterwards, and to report any adverse reaction to me immediately.       Screening performed by Krista Tobar on 11/21/2017 at 8:35 AM.

## 2017-11-21 NOTE — PROGRESS NOTES
SUBJECTIVE:   Jonathon Guerra is a 72 year old male who presents for Preventive Visit.  Are you in the first 12 months of your Medicare coverage?  No    Physical   Annual:     Getting at least 3 servings of Calcium per day::  Yes    Bi-annual eye exam::  Yes    Dental care twice a year::  Yes    Sleep apnea or symptoms of sleep apnea::  Sleep apnea    Taking medications regularly::  Yes    Medication side effects::  None    Additional concerns today::  YES      COGNITIVE SCREEN  1) Repeat 3 items (Banana, Sunrise, Chair)    2) Clock draw: NORMAL  3) 3 item recall: Recalls 3 objects  Results: 3 items recalled: COGNITIVE IMPAIRMENT LESS LIKELY    Mini-CogTM Copyright S Carlitos. Licensed by the author for use in [facility]; reprinted with permission (soob@.[facility]). All rights reserved.          Reviewed and updated as needed this visit by clinical staff         Reviewed and updated as needed this visit by Provider      Social History   Substance Use Topics     Smoking status: Former Smoker     Start date: 1/30/1966     Quit date: 11/30/1992     Smokeless tobacco: Never Used     Alcohol use No       The patient does not drink >3 drinks per day nor >7 drinks per week.          Wants a PSA, brought in blood tests results from [facility]'s PHQ-2 Score: PHQ-2 ( 1999 Pfizer) 11/21/2017   Q1: Little interest or pleasure in doing things 0   Q2: Feeling down, depressed or hopeless 0   PHQ-2 Score 0   Q1: Little interest or pleasure in doing things Not at all   Q2: Feeling down, depressed or hopeless Not at all   PHQ-2 Score 0       Do you feel safe in your environment - Yes    Do you have a Health Care Directive?: Yes: Advance Directive has been received and scanned.    Current providers sharing in care for this patient include:   Patient Care Team:  Perez Beckett MD as PCP - General (Family Practice)      Hearing impairment: No    Ability to successfully perform activities of daily living: Yes, no  assistance needed     Fall risk:         Home safety:  none identified  click delete button to remove this line now    The following health maintenance items are reviewed in Epic and correct as of today:Health Maintenance   Topic Date Due     HEPATITIS C SCREENING  08/09/1965     AORTIC ANEURYSM SCREENING (SYSTEM ASSIGNED)  08/09/2012     FALL RISK ASSESSMENT  09/20/2017     ADVANCE DIRECTIVE PLANNING Q5 YRS  11/12/2019     LIPID SCREEN Q5 YR MALE (SYSTEM ASSIGNED)  08/17/2022     TETANUS IMMUNIZATION (SYSTEM ASSIGNED)  10/28/2024     COLON CANCER SCREEN (SYSTEM ASSIGNED)  04/22/2025     INFLUENZA VACCINE (SYSTEM ASSIGNED)  Completed     PNEUMOCOCCAL  Completed     Labs reviewed in EPIC  Patient Active Problem List   Diagnosis     Elevated prostate specific antigen (PSA)     Hypothyroidism     Atrial fibrillation (H)     Insomnia     Esophageal reflux     Hyperlipidemia     Impotence of organic origin     Urticaria     Arthropathy, lower leg     Advance Care Planning     Hyperlipidemia     Hypertension     Coronary artery disease     Tinea corporis     Pure hypercholesterolemia     Past Surgical History:   Procedure Laterality Date     HERNIA REPAIR, INGUINAL RT/LT       OTHER SURGICAL HISTORY      upper and lower partial plate       Social History   Substance Use Topics     Smoking status: Former Smoker     Start date: 1/30/1966     Quit date: 11/30/1992     Smokeless tobacco: Never Used     Alcohol use No     Family History   Problem Relation Age of Onset     CANCER Father      prostate     CANCER Brother      Other - See Comments Mother      old age         Allergies   Allergen Reactions     Atorvastatin      Lipitor--muscle aches     Recent Labs   Lab Test  08/17/17   0744  09/20/16   0938  06/23/16   0857  05/12/15   0753  10/28/14   1511  10/28/13  11/30/12   0831   LDL  85  83  90  80   --    < >  89  87   HDL  50  68  66  53   --    < >  82  67   TRIG  80  53  79  81   --    < >  94  72   ALT  <5*  30   --    "<5*   --    < >   --   61   CR   --   0.80   --    --    --    --    --   1.00   GFRESTIMATED   --   >90   --    --    --    --    --   75   GFRESTBLACK   --   >90   --    --    --    --    --   >90   POTASSIUM   --   4.4   --    --    --    --   4.5  4.2   TSH   --   1.07   --    --   1.31   --    --   2.55    < > = values in this interval not displayed.            Review of Systems   has Elevated prostate specific antigen (PSA); Hypothyroidism; Atrial fibrillation (H); Insomnia; Gastroesophageal reflux disease without esophagitis; Hyperlipidemia; Impotence of organic origin; Urticaria; Arthropathy, lower leg; Advance Care Planning; Hyperlipidemia; Hypertension; Coronary artery disease; Tinea corporis; Pure hypercholesterolemia; SUKH (obstructive sleep apnea); and Benign paroxysmal positional vertigo, right on his problem list.    HISTORY OF ELEVATED PSA IMPROVED ON SAW PALMETTO     ATRIAL FIBRILLATION  WILLING TO TAKE RISK AND REMAIN ON ASPIRIN 325MG PO DAILY     GASTROESOPHAGEAL REFLUX DISEASE WITHOUT ESOPHAGITIS CONTROLLED PROTON PUMP INHIBITOR PRN    SEXUAL DYSFUNCTION ONGOING     OSTEOARTHRITIS KNEE PAIN IMPORVED    LDL OR \"BAD\" CHOLESTEROL  GOAL < 100     CORONARY ARTERY DISEASE CONTROLLED     TINEA CORPORIS RESOLVED     OBSTRUCTIVE SLEEP APNEA SOLVED BY SLEEPING POSITION     EPLEY MANEUVER FOR BENIGN POSITIONAL VERTIGO ON LEFT    C: NEGATIVE for fever, chills, change in weight  I: NEGATIVE for worrisome rashes, moles or lesions  E: NEGATIVE for vision changes or irritation  E/M: NEGATIVE for ear, mouth and throat problems  R: NEGATIVE for significant cough or SOB  B: NEGATIVE for masses, tenderness or discharge  CV: NEGATIVE for chest pain, palpitations or peripheral edema  GI: NEGATIVE for nausea, abdominal pain, heartburn, or change in bowel habits  : NEGATIVE for frequency, dysuria, or hematuria  M: NEGATIVE for significant arthralgias or myalgia  N: NEGATIVE for weakness, dizziness or paresthesias  E: " "NEGATIVE for temperature intolerance, skin/hair changes  H: NEGATIVE for bleeding problems  P: NEGATIVE for changes in mood or affect    OBJECTIVE:   There were no vitals taken for this visit. Estimated body mass index is 30 kg/(m^2) as calculated from the following:    Height as of 8/30/17: 5' 7.25\" (1.708 m).    Weight as of 8/30/17: 193 lb (87.5 kg).  Physical Exam  GENERAL: healthy, alert and no distress  EYES: Eyes grossly normal to inspection, PERRL and conjunctivae and sclerae normal  HENT: ear canals and TM's normal, nose and mouth without ulcers or lesions  NECK: no adenopathy, no asymmetry, masses, or scars and thyroid normal to palpation  RESP: lungs clear to auscultation - no rales, rhonchi or wheezes  CV: regular rate and rhythm, normal S1 S2, no S3 or S4, no murmur, click or rub, no peripheral edema and peripheral pulses strong  ABDOMEN: soft, nontender, no hepatosplenomegaly, no masses and bowel sounds normal   (male): normal male genitalia without lesions or urethral discharge, no hernia  RECTAL: normal sphincter tone, no rectal masses, prostate normal size, smooth, nontender without nodules or masses  MS: no gross musculoskeletal defects noted, no edema  SKIN: no suspicious lesions or rashes  NEURO: Normal strength and tone, mentation intact and speech normal  PSYCH: mentation appears normal, affect normal/bright  LYMPH: no cervical, supraclavicular, axillary, or inguinal adenopathy  Diabetic foot exam: normal DP and PT pulses, no trophic changes or ulcerative lesions, normal sensory exam and normal monofilament exam    ASSESSMENT / PLAN:       ICD-10-CM    1. Routine history and physical examination of adult Z00.00    2. Chronic atrial fibrillation (H) I48.2    3. Benign paroxysmal positional vertigo, right H81.11    4. Dribbling N39.43    5. Gastroesophageal reflux disease without esophagitis K21.9    6. Hyperlipidemia, unspecified hyperlipidemia type E78.5    7. Impotence of organic origin " "N52.9    8. Coronary artery disease involving native coronary artery of native heart without angina pectoris I25.10 Lipid panel reflex to direct LDL Fasting     ALT   9. Pure hypercholesterolemia E78.00    10. Elevated prostate specific antigen (PSA) R97.20    11. Encounter for routine adult medical exam with abnormal findings Z00.01    12. Special screening for malignant neoplasm of prostate Z12.5 Prostate spec antigen screen   13. Encounter for hepatitis C screening test for low risk patient Z11.59 **Hepatitis C Screen Reflex to RNA FUTURE anytime     **Hepatitis C Screen Reflex to RNA FUTURE anytime   14. Need for vaccination Z23 HEPATITIS B VACCINE,  ADULT  [[id number]]     1st  Administration  [[id number]]       End of Life Planning:  Patient currently has an advanced directive: Yes.  Practitioner is supportive of decision.    COUNSELING:  Reviewed preventive health counseling, as reflected in patient instructions       Regular exercise       Healthy diet/nutrition       Vision screening       Hearing screening       Dental care       Hepatitis C screening        Estimated body mass index is 30 kg/(m^2) as calculated from the following:    Height as of 8/30/17: 5' 7.25\" (1.708 m).    Weight as of 8/30/17: 193 lb (87.5 kg).     reports that he quit smoking about 24 years ago. He started smoking about 51 years ago. He has never used smokeless tobacco.        Appropriate preventive services were discussed with this patient, including applicable screening as appropriate for cardiovascular disease, diabetes, osteopenia/osteoporosis, and glaucoma.  As appropriate for age/gender, discussed screening for colorectal cancer, prostate cancer, breast cancer, and cervical cancer. Checklist reviewing preventive services available has been given to the patient.    Reviewed patients plan of care and provided an AVS. The Basic Care Plan (routine screening as documented in Health Maintenance) for Jonathon meets the Care Plan requirement. " This Care Plan has been established and reviewed with the Patient.    Counseling Resources:  ATP IV Guidelines  Pooled Cohorts Equation Calculator  Breast Cancer Risk Calculator  FRAX Risk Assessment  ICSI Preventive Guidelines  Dietary Guidelines for Americans, 2010  USDA's MyPlate  ASA Prophylaxis  Lung CA Screening    RINKU TONEY MD  [facility]

## 2017-11-21 NOTE — LETTER
[facility]  [address]  [address]  [place] [address]  [phone]                                                                                                           Jonathon Guanene Guerra  [address]   [place] [address]    November 22, 2017      Dear Jonathon,    GRADUAL INCREASE IN PSA   STILL WITHIN NORMAL PSA OR PROSTATE CANCER SCREENING TEST,     BORDERLINE  HEPATITIS C ANTIBODY   NEW SPECIMEN IS RECOMMENDED   Results for orders placed or performed in visit on 11/21/17   Prostate spec antigen screen   Result Value Ref Range    PSA 3.46 0 - 4 ug/L   **Hepatitis C Screen Reflex to RNA FUTURE anytime   Result Value Ref Range    Hepatitis C Antibody (A) NR^Nonreactive     Equivocal results-Antibodies to HCV may or may not be present. Please collect a new   specimen.           Thank you for choosing [facility].  We appreciate the opportunity to serve you and look forward to supporting your healthcare needs in the future.    If you have any questions or concerns, please call me or my staff at [phone].      Sincerely,    Perez Beckett Jr MD

## 2017-11-21 NOTE — PATIENT INSTRUCTIONS
(Z00.00) Routine history and physical examination of adult  (primary encounter diagnosis)  Comment:    Plan:      (I48.2) Chronic atrial fibrillation (H)  Comment:    Plan:      (H81.11) Benign paroxysmal positional vertigo, right  Comment:    Plan:      (N39.43) Dribbling  Comment:    Plan:      (K21.9) Gastroesophageal reflux disease without esophagitis  Comment:    Plan:      (E78.5) Hyperlipidemia, unspecified hyperlipidemia type  Comment:    Plan:      (N52.9) Impotence of organic origin  Comment:    Plan:      (I25.10) Coronary artery disease involving native coronary artery of native heart without angina pectoris  Comment:    Plan: Lipid panel reflex to direct LDL Fasting, ALT             (E78.00) Pure hypercholesterolemia  Comment:    Plan:      (R97.20) Elevated prostate specific antigen (PSA)  Comment:    Plan:      (Z00.01) Encounter for routine adult medical exam with abnormal findings  Comment:    Plan:      (Z12.5) Special screening for malignant neoplasm of prostate  Comment:    Plan: Prostate spec antigen screen             (Z11.59) Encounter for hepatitis C screening test for low risk patient  Comment:    Plan: **Hepatitis C Screen Reflex to RNA FUTURE         anytime

## 2017-11-22 NOTE — PROGRESS NOTES
Please send normal lab letter when labs are complete  GRADUAL INCREASE IN PSA   STILL WITHIN NORMAL PSA OR PROSTATE CANCER SCREENING TEST,     BORDERLINE  HEPATITIS C ANTIBODY   NEW SPECIMEN IS RECOMMENDED   RINKU TONEY JR., MD

## 2017-12-16 DIAGNOSIS — E03.9 ACQUIRED HYPOTHYROIDISM: ICD-10-CM

## 2017-12-19 RX ORDER — LEVOTHYROXINE SODIUM 100 UG/1
TABLET ORAL
Qty: 30 TABLET | Refills: 0 | Status: SHIPPED | OUTPATIENT
Start: 2017-12-19 | End: 2018-03-10

## 2017-12-19 NOTE — TELEPHONE ENCOUNTER
Requested Prescriptions   Pending Prescriptions Disp Refills     levothyroxine (SYNTHROID/LEVOTHROID) 100 MCG tablet [Pharmacy Med Name: LEVOTHYROXINE 100 MCG  TAB]  Last Written Prescription Date:  6/21/17  Last Fill Quantity: 90 tablet,  # refills: 0   Last Office Visit with G, P or [facility] prescribing provider:  11/21/17   Future Office Visit:    90 tablet 0     Sig: TAKE 1 TABLET BY MOUTH DAILY.    Thyroid Protocol Failed    12/16/2017  9:07 AM       Failed - Normal TSH on file in past 12 months    Recent Labs   Lab Test  09/20/16   0938   TSH  1.07           Passed - Patient is 12 years or older       Passed - Recent or future visit with authorizing provider's specialty    Patient had office visit in the last year or has a visit in the next 30 days with authorizing provider.  See chart review.             Medication is being filled for 1 time refill only due to:  Patient needs labs is overdue for Thyroid labs..

## 2018-01-15 DIAGNOSIS — B19.20 HEPATITIS C VIRUS INFECTION WITHOUT HEPATIC COMA, UNSPECIFIED CHRONICITY: Primary | ICD-10-CM

## 2018-01-15 DIAGNOSIS — E03.9 ACQUIRED HYPOTHYROIDISM: ICD-10-CM

## 2018-01-15 DIAGNOSIS — B19.20 HEPATITIS C VIRUS INFECTION WITHOUT HEPATIC COMA, UNSPECIFIED CHRONICITY: ICD-10-CM

## 2018-01-15 DIAGNOSIS — I25.10 CORONARY ARTERY DISEASE INVOLVING NATIVE CORONARY ARTERY OF NATIVE HEART WITHOUT ANGINA PECTORIS: ICD-10-CM

## 2018-01-15 PROCEDURE — 84443 ASSAY THYROID STIM HORMONE: CPT | Performed by: FAMILY MEDICINE

## 2018-01-15 PROCEDURE — 84460 ALANINE AMINO (ALT) (SGPT): CPT | Performed by: FAMILY MEDICINE

## 2018-01-15 PROCEDURE — 87522 HEPATITIS C REVRS TRNSCRPJ: CPT | Performed by: FAMILY MEDICINE

## 2018-01-15 PROCEDURE — 36415 COLL VENOUS BLD VENIPUNCTURE: CPT | Performed by: FAMILY MEDICINE

## 2018-01-15 PROCEDURE — 80061 LIPID PANEL: CPT | Performed by: FAMILY MEDICINE

## 2018-01-15 NOTE — LETTER
"January 16, 2018      Jonathon Guerra  [address]   [place] [address]        Dear ,    We are writing to inform you of your test results.    NORMAL HEPATITIS C SCREEN  NORMAL THYROID STIMULATING HORMONE TEST   NORMAL TOTAL CHOLESTEROL   NORMAL LDL OR \"BAD\" CHOLESTEROL   NORMAL VERY LOW DENSITY CHOLESTEROL   NORMAL HDL OR \"GOOD\" CHOLESTEROL   NORMAL TRIGLYCERIDES   NORMAL LIVER FUNCTION TEST     RINKU TONEY JR., MD       Resulted Orders   Lipid panel reflex to direct LDL Fasting   Result Value Ref Range    Cholesterol 129 <200 mg/dL    Triglycerides 97 <150 mg/dL    HDL Cholesterol 60 >39 mg/dL    LDL Cholesterol Calculated 50 <100 mg/dL      Comment:      Desirable:       <100 mg/dl    Non HDL Cholesterol 69 <130 mg/dL   ALT   Result Value Ref Range    ALT 27 0 - 70 U/L   TSH   Result Value Ref Range    TSH 2.43 0.40 - 4.00 mU/L   Hepatitis C RNA, quantitative   Result Value Ref Range    HCV RNA Quant IU/ml HCV RNA Not Detected HCVND^HCV RNA Not Detected [IU]/mL      Comment:      The RONNY AmpliPrep/RONNY TaqMan HCV Test is an FDA-approved in vitro nucleic   acid amplification test for the quantitation of HCV RNA in human plasma (ETDA   plasma) or serum using the RONNY AmpliPrep Instrument for automated viral   nucleic acid extraction and the RONNY TaqMan Analyzer or RONNY TaqMan for   automated Real Time PCR amplification and detection of the viral nucleic acid   target.  Titer results are reported in International Units/mL (IU/mL) using the 1st WHO   International standard for HCV for Nucleic Acid Amplification based assays.      Log of HCV RNA Qt Not Calculated <1.2 Log IU/mL       If you have any questions or concerns, please call the clinic at the number listed above.       Sincerely,        RINKU TONEY JR., MD                 "

## 2018-01-16 LAB
ALT SERPL W P-5'-P-CCNC: 27 U/L (ref 0–70)
CHOLEST SERPL-MCNC: 129 MG/DL
HCV RNA SERPL NAA+PROBE-ACNC: NORMAL [IU]/ML
HCV RNA SERPL NAA+PROBE-LOG IU: NORMAL LOG IU/ML
HDLC SERPL-MCNC: 60 MG/DL
LDLC SERPL CALC-MCNC: 50 MG/DL
NONHDLC SERPL-MCNC: 69 MG/DL
TRIGL SERPL-MCNC: 97 MG/DL
TSH SERPL DL<=0.005 MIU/L-ACNC: 2.43 MU/L (ref 0.4–4)

## 2018-03-10 DIAGNOSIS — E03.9 ACQUIRED HYPOTHYROIDISM: ICD-10-CM

## 2018-03-10 NOTE — TELEPHONE ENCOUNTER
"Requested Prescriptions   Pending Prescriptions Disp Refills     levothyroxine (SYNTHROID/LEVOTHROID) 100 MCG tablet [Pharmacy Med Name: LEVOTHYROXINE 100 MCG  TAB]  Last Written Prescription Date:  12/19/2017  Last Fill Quantity: 30 TABLETS,  # refills: 0   Last office visit: 11/21/2017 with prescribing provider:  MARYAM TONEY  Future Office Visit:     30 tablet 0     Sig: TAKE 1 TABLET BY MOUTH ONCE DAILY. (1 MONTH FILL ONLY. DUE FOR LABS).    Thyroid Protocol Passed    3/10/2018  9:07 AM       Passed - Patient is 12 years or older       Passed - Recent (12 mo) or future (30 days) visit within the authorizing provider's specialty    Patient had office visit in the last 12 months or has a visit in the next 30 days with authorizing provider or within the authorizing provider's specialty.  See \"Patient Info\" tab in inbasket, or \"Choose Columns\" in Meds & Orders section of the refill encounter.           Passed - Normal TSH on file in past 12 months    Recent Labs   Lab Test  01/15/18   0756   TSH  2.43                "

## 2018-03-12 RX ORDER — LEVOTHYROXINE SODIUM 100 UG/1
TABLET ORAL
Qty: 90 TABLET | Refills: 1 | Status: SHIPPED | OUTPATIENT
Start: 2018-03-12 | End: 2018-09-07

## 2018-04-09 ENCOUNTER — TELEPHONE (OUTPATIENT)
Dept: FAMILY MEDICINE | Facility: CLINIC | Age: 73
End: 2018-04-09

## 2018-04-09 NOTE — TELEPHONE ENCOUNTER
levothyroxine (SYNTHROID/LEVOTHROID) 100 MCG tablet 90 tablet 1 3/12/2018  No      Sig: TAKE 1 TABLET BY MOUTH ONCE DAILY. (1 MONTH FILL ONLY. DUE FOR LABS).     Class: E-Prescribe     Notes to Pharmacy: THANKS     Order: [id number]     E-Prescribing Status: Receipt confirmed by pharmacy (3/12/2018  4:51 PM CDT)         Patient is calling stating that the prescription above was sent over for 30 days with 1 refill. His prescription label states.    Writer stated that was incorrect and that the pharmacy filled for less than what we sent over, to follow up with the pharmacy.

## 2018-09-07 DIAGNOSIS — E03.9 ACQUIRED HYPOTHYROIDISM: ICD-10-CM

## 2018-09-08 NOTE — TELEPHONE ENCOUNTER
"Requested Prescriptions   Pending Prescriptions Disp Refills     levothyroxine (SYNTHROID/LEVOTHROID) 100 MCG tablet [Pharmacy Med Name: LEVOTHYROXINE 100 MCG  TAB]  Last Written Prescription Date:  03/12/2018  Last Fill Quantity: 90,  # refills: 1   Last office visit: 11/21/2017 with prescribing provider:  MARYAM TONEY    Future Office Visit:   Next 5 appointments (look out 90 days)     Nov 09, 2018  8:15 AM CST   Return Visit with Jorge Luis Westbrook MD   [facility] ([facility])    [address] [place] [id number] [phone] OPT 2                  90 tablet 1     Sig: TAKE 1 TABLET BY MOUTH ONCE DAILY. *MUST SCHEDULE APPOINTMENT FOR FURTHER REFILLS*    Thyroid Protocol Passed    9/7/2018  5:25 PM       Passed - Patient is 12 years or older       Passed - Recent (12 mo) or future (30 days) visit within the authorizing provider's specialty    Patient had office visit in the last 12 months or has a visit in the next 30 days with authorizing provider or within the authorizing provider's specialty.  See \"Patient Info\" tab in inbasket, or \"Choose Columns\" in Meds & Orders section of the refill encounter.           Passed - Normal TSH on file in past 12 months    Recent Labs   Lab Test  01/15/18   0756   TSH  2.43                "

## 2018-09-10 RX ORDER — LEVOTHYROXINE SODIUM 100 UG/1
TABLET ORAL
Qty: 90 TABLET | Refills: 0 | Status: SHIPPED | OUTPATIENT
Start: 2018-09-10 | End: 2018-10-17

## 2018-09-10 NOTE — TELEPHONE ENCOUNTER
Prescription approved per [facility] Refill Protocol.

## 2018-10-16 ENCOUNTER — OFFICE VISIT (OUTPATIENT)
Dept: FAMILY MEDICINE | Facility: CLINIC | Age: 73
End: 2018-10-16
Payer: COMMERCIAL

## 2018-10-16 VITALS
TEMPERATURE: 96.6 F | BODY MASS INDEX: 29.63 KG/M2 | DIASTOLIC BLOOD PRESSURE: 64 MMHG | WEIGHT: 192 LBS | OXYGEN SATURATION: 96 % | HEART RATE: 57 BPM | RESPIRATION RATE: 16 BRPM | SYSTOLIC BLOOD PRESSURE: 110 MMHG

## 2018-10-16 DIAGNOSIS — I25.10 CORONARY ARTERY DISEASE INVOLVING NATIVE CORONARY ARTERY OF NATIVE HEART WITHOUT ANGINA PECTORIS: ICD-10-CM

## 2018-10-16 DIAGNOSIS — E78.00 PURE HYPERCHOLESTEROLEMIA: ICD-10-CM

## 2018-10-16 DIAGNOSIS — M54.32 SCIATICA WITHOUT BACK PAIN, LEFT: Primary | ICD-10-CM

## 2018-10-16 DIAGNOSIS — Z12.5 SCREENING PSA (PROSTATE SPECIFIC ANTIGEN): ICD-10-CM

## 2018-10-16 DIAGNOSIS — E03.9 ACQUIRED HYPOTHYROIDISM: ICD-10-CM

## 2018-10-16 DIAGNOSIS — Z23 NEED FOR PROPHYLACTIC VACCINATION AND INOCULATION AGAINST INFLUENZA: ICD-10-CM

## 2018-10-16 DIAGNOSIS — I48.20 CHRONIC ATRIAL FIBRILLATION (H): ICD-10-CM

## 2018-10-16 PROCEDURE — 80048 BASIC METABOLIC PNL TOTAL CA: CPT | Performed by: FAMILY MEDICINE

## 2018-10-16 PROCEDURE — G0103 PSA SCREENING: HCPCS | Performed by: FAMILY MEDICINE

## 2018-10-16 PROCEDURE — 84443 ASSAY THYROID STIM HORMONE: CPT | Performed by: FAMILY MEDICINE

## 2018-10-16 PROCEDURE — 90662 IIV NO PRSV INCREASED AG IM: CPT | Performed by: FAMILY MEDICINE

## 2018-10-16 PROCEDURE — G0008 ADMIN INFLUENZA VIRUS VAC: HCPCS | Performed by: FAMILY MEDICINE

## 2018-10-16 PROCEDURE — 99214 OFFICE O/P EST MOD 30 MIN: CPT | Mod: 25 | Performed by: FAMILY MEDICINE

## 2018-10-16 PROCEDURE — 36415 COLL VENOUS BLD VENIPUNCTURE: CPT | Performed by: FAMILY MEDICINE

## 2018-10-16 NOTE — PATIENT INSTRUCTIONS
(M54.32) Sciatica without back pain, left  Comment:  recommended stretches  Plan: [facility] PT, HAND, AND CHIROPRACTIC REFERRAL             (Z12.5) Screening PSA (prostate specific antigen)  Comment:    Plan: Prostate spec antigen screen             (I48.2) Chronic atrial fibrillation (H)  Comment:    Plan:      (E03.9) Acquired hypothyroidism  Comment:    Plan: TSH             (E78.00) Pure hypercholesterolemia  Comment:    Plan:      (I25.10) Coronary artery disease involving native coronary artery of native heart without angina pectoris  Comment:    Plan: Basic metabolic panel    .(Z23) N eed for prophylactic vaccination and inoculation against influenza  (primary encounter diagnosis)  Comment:    Plan: FLU VACCINE, INCREASED ANTIGEN, PRESV FREE, AGE        65+ [01177], Vaccine Administration, Initial         [[id number]]

## 2018-10-16 NOTE — MR AVS SNAPSHOT
After Visit Summary   10/16/2018    Jonathon Guerra    MRN: [id number]           Patient Information     Date Of Birth          1945        Visit Information        Provider Department      10/16/2018 2:15 PM Perez Beckett MD [facility]        Today's Diagnoses     Need for prophylactic vaccination and inoculation against influenza    -  1    Sciatica without back pain, left        Screening PSA (prostate specific antigen)        Chronic atrial fibrillation (H)        Acquired hypothyroidism        Pure hypercholesterolemia        Coronary artery disease involving native coronary artery of native heart without angina pectoris          Care Instructions                 (M54.32) Sciatica without back pain, left  Comment:  recommended stretches  Plan: [facility] PT, HAND, AND CHIROPRACTIC REFERRAL             (Z12.5) Screening PSA (prostate specific antigen)  Comment:    Plan: Prostate spec antigen screen             (I48.2) Chronic atrial fibrillation (H)  Comment:    Plan:      (E03.9) Acquired hypothyroidism  Comment:    Plan: TSH             (E78.00) Pure hypercholesterolemia  Comment:    Plan:      (I25.10) Coronary artery disease involving native coronary artery of native heart without angina pectoris  Comment:    Plan: Basic metabolic panel    .(Z23) N eed for prophylactic vaccination and inoculation against influenza  (primary encounter diagnosis)  Comment:    Plan: FLU VACCINE, INCREASED ANTIGEN, PRESV FREE, AGE        65+ [23152], Vaccine Administration, Initial         [12304]                        Follow-ups after your visit        Additional Services     [facility] PT, HAND, AND CHIROPRACTIC REFERRAL       Physical Therapy, Hand Therapy and Chiropractic Care are available through:  *[facility]  *Hand Therapy (Occupational Therapy or Physical Therapy)  *[facility]    Call one number to schedule at any of the  above locations: [phone].    Physical therapy, Hand therapy and/or Chiropractic care has been recommended by your physician as an excellent treatment option to reduce pain and help people return to normal activities, including sports.  Therapy and/or chiropractic care services are a great complement or alternative to expensive and invasive surgery, injections, or long-term use of prescription medications. The primary goal is to identify the underlying problem and provide you the tools to manage your condition on your own.     Please be aware that coverage of these services is subject to the terms and limitations of your health insurance plan.  Call member services at your health plan with any benefit or coverage questions.      Please bring the following to your appointment:  *Your personal calendar for scheduling future appointments  *Comfortable clothing                  Your next 10 appointments already scheduled     Oct 30, 2018  8:30 AM CDT   LAB with [facility]   [facility]YS[facility] AT [place] ([facility])    [address]  [facility] [address] [phone]           Please do not eat 10-12 hours before your appointment if you are coming in fasting for labs on lipids, cholesterol, or glucose (sugar). This does not apply to pregnant women. Water, hot tea and black coffee (with nothing added) are okay. Do not drink other fluids, diet soda or chew gum.            Oct 30, 2018  8:45 AM CDT   Ech Stress Test with [id number]   [facility] CV Echocardiography ([facility])    [address]  [facility] [address] [phone]           1. Please bring or wear a comfortable two-piece outfit and walking shoes. 2. Stop eating 3 hours before the test. You may drink water or juice. 3. Stop all caffeine 12 hours before the test. This includes coffee, tea, soda pop, chocolate and certain medicines (such as  Anacin and Excederin). Also avoid decaf coffee and tea, as these contain small amounts of caffeine. 4. No alcohol, smoking or use of other tobacco products for 12 hours before the test. 5. Refer to your provider instructions to see if you need to stop any medications (such as beta-blockers or nitrates) for this test. 6. For patients with diabetes: - If you take insulin, call your diabetes care team. Ask if you should take a   dose the morning of your test. - If you take diabetes medicine by mouth, dont take it on the morning of your test. Bring it with you to take after the test. (If you have questions, call your diabetes care team) 7. When you arrive, please tell us if: - You have diabetes. - You have taken Viagra, Cialis or Levitra in the past 48 hours. 8. For any questions that cannot be answered, please contact the ordering physician 9. Please do not wear perfumes or scented lotions on the day of your exam.            Nov 09, 2018  8:15 AM CST   Return Visit with Jorge Luis Westbrook MD   [facility] ([facility])    [address] [place], [place] [id number]  [facility] [address]   [phone] OPT 2            Nov 29, 2018  2:45 PM CST   PHYSICAL with Perez Beckett MD   [facility]    [address]  [address]  [facility] [address] [phone]              Future tests that were ordered for you today     Open Future Orders        Priority Expected Expires Ordered    [facility] PT, HAND, AND CHIROPRACTIC REFERRAL Routine  10/16/2019 10/16/2018            Who to contact     If you have questions or need follow up information about today's clinic visit or your schedule please contact [facility] directly at [phone].  Normal or non-critical lab and imaging results will be communicated to you by MyChart, letter or phone within 4 business days after the  clinic has received the results. If you do not hear from us within 7 days, please contact the clinic through [facility] or phone. If you have a critical or abnormal lab result, we will notify you by phone as soon as possible.  Submit refill requests through [employer] or call your pharmacy and they will forward the refill request to us. Please allow 3 business days for your refill to be completed.          Additional Information About Your Visit        [employer] Information     [facility] gives you secure access to your electronic health record. If you see a primary care provider, you can also send messages to your care team and make appointments. If you have questions, please call your primary care clinic.  If you do not have a primary care provider, please call [phone] and they will assist you.        Care EveryWhere ID     This is your Care EveryWhere ID. This could be used by other organizations to access your [place] medical records  [id number]        Your Vitals Were     Pulse Temperature Respirations Pulse Oximetry BMI (Body Mass Index)       57 96.6  F (35.9  C) (Tympanic) 16 96% 29.63 kg/m2        Blood Pressure from Last 3 Encounters:   10/16/18 110/64   11/21/17 102/60   08/30/17 118/70    Weight from Last 3 Encounters:   10/16/18 192 lb (87.1 kg)   11/21/17 190 lb 8 oz (86.4 kg)   08/30/17 193 lb (87.5 kg)              We Performed the Following     Basic metabolic panel     FLU VACCINE, INCREASED ANTIGEN, PRESV FREE, AGE 65+ [53959]     Prostate spec antigen screen     TSH     Vaccine Administration, Initial [00021]        Primary Care Provider Office Phone # Fax #    Perez Keeley Beckett [phone] [address] [facility] [address]        Equal Access to Services     [facility] RADHA : Jim Cardozo, asad darden, alexsander arriaza. So [facility] [phone].    ATENCIÓN: Si habla español, tiene a schwarz disposición  servicios gratuitos de asistencia lingüística. Ramirez bethea [phone].    We comply with applicable federal civil rights laws and [place] laws. We do not discriminate on the basis of race, color, national origin, age, disability, sex, sexual orientation, or gender identity.            Thank you!     Thank you for choosing [facility]  for your care. Our goal is always to provide you with excellent care. Hearing back from our patients is one way we can continue to improve our services. Please take a few minutes to complete the written survey that you may receive in the mail after your visit with us. Thank you!             Your Updated Medication List - Protect others around you: Learn how to safely use, store and throw away your medicines at www.disposemymeds.org.          This list is accurate as of 10/16/18  2:55 PM.  Always use your most recent med list.                   Brand Name Dispense Instructions for use Diagnosis    acetaminophen 650 MG 8 hour tablet      Take by mouth daily        ACETAMINOPHEN PM PO      Take 500 mg by mouth        aspirin 325 MG tablet      Take 325 mg by mouth daily.        cholecalciferol 1000 UNIT tablet    vitamin D3     Take 1 tablet by mouth daily        ciclopirox 0.77 % cream    LOPROX     as needed        ketoconazole 2 % cream    NIZORAL    15 g    APPLY TOPICALLY 2 TIMES DAILY    Tinea corporis       levothyroxine 100 MCG tablet    SYNTHROID/LEVOTHROID    90 tablet    TAKE 1 TABLET BY MOUTH ONCE DAILY. *MUST SCHEDULE APPOINTMENT FOR FURTHER REFILLS*    Acquired hypothyroidism       MELATONIN PO      Take 10 mg by mouth At Bedtime        metoprolol succinate 25 MG 24 hr tablet    TOPROL-XL     1/2 tablet daily        MULTI-VITAMIN PO      Take by mouth daily        NITROSTAT SL      Place 0.4 mg under the tongue.        rosuvastatin 40 MG tablet    CRESTOR    90 tablet    Take 1 tablet (40 mg) by mouth daily    Hypercholesterolemia        sotalol 80 MG tablet    BETAPACE     Take 1 tablet by mouth 2 times daily.        triamcinolone 0.5 % cream    KENALOG    30 g    Apply sparingly to affected area three times daily.    Rash

## 2018-10-16 NOTE — PROGRESS NOTES
"  SUBJECTIVE:   Jonathon Guerra is a 73 year old male who presents to clinic today for the following health issues:      Musculoskeletal problem/pain      Duration: 1 month    Description  Location: left hamstring    Intensity:  5/10    Accompanying signs and symptoms: weakness in left leg walking up hill    History  Previous similar problem: no   Previous evaluation:  none    Precipitating or alleviating factors:  Trauma or overuse: no   Aggravating factors include: sitting and walking    Therapies tried and outcome: ice and massage      Medication Followup of levothyroxine    Taking Medication as prescribed: yes    Side Effects:  None    Medication Helping Symptoms:  Yes    Needs refill     HISTORY OF ELEVATED PROSTATE SPECIFIC ANTIGEN     LOW THYROID MONITOR LEVOTHYROIXINE REPLACEMENT     VITAMIN D REPLACEMENT      ASPIRIN PREVENTION     ATRIAL FIBRILLATION  HISTORY  BETAPACE  ,THAT IS SOTALOL     INSOMNIA NOT ADDRESSED TODAY     URTICARIA NOT ACTIVE     CORONARY ARTERY DISEASE ASYMPTOMATIC     LDL OR \"BAD\" CHOLESTEROL  GOAL < 100 ON STATIN WITHOUT COMPLICATION     CRESTOR 40MG WITHOUT COMPLICATION     MELATONIN FOR SLEEP       .      Problem list and histories reviewed & adjusted, as indicated.  Additional history: as documented      Patient Active Problem List   Diagnosis     Elevated prostate specific antigen (PSA)     Hypothyroidism     Atrial fibrillation (H)     Insomnia     Gastroesophageal reflux disease without esophagitis     Hyperlipidemia     Impotence of organic origin     Urticaria     Arthropathy, lower leg     Advance Care Planning     Hyperlipidemia     Coronary artery disease     Tinea corporis     Pure hypercholesterolemia     SUKH (obstructive sleep apnea)     Benign paroxysmal positional vertigo, right     Sciatica without back pain, left     Past Surgical History:   Procedure Laterality Date     HERNIA REPAIR, INGUINAL RT/LT       OTHER SURGICAL HISTORY      upper and lower partial plate    "    Social History   Substance Use Topics     Smoking status: Former Smoker     Start date: 1/30/1966     Quit date: 11/30/1992     Smokeless tobacco: Never Used     Alcohol use No     Family History   Problem Relation Age of Onset     Cancer Father      prostate     Cancer Brother      Other - See Comments Mother      old age         Current Outpatient Prescriptions   Medication Sig Dispense Refill     Acetaminophen 650 MG TABS Take by mouth daily       aspirin 325 MG tablet Take 325 mg by mouth daily.       cholecalciferol (VITAMIN D3) 1000 UNIT tablet Take 1 tablet by mouth daily        Diphenhydramine-APAP, sleep, (ACETAMINOPHEN PM PO) Take 500 mg by mouth       levothyroxine (SYNTHROID/LEVOTHROID) 100 MCG tablet TAKE 1 TABLET BY MOUTH ONCE DAILY. *MUST SCHEDULE APPOINTMENT FOR FURTHER REFILLS* 90 tablet 0     MELATONIN PO Take 10 mg by mouth At Bedtime        metoprolol (TOPROL-XL) 25 MG 24 hr tablet 1/2 tablet daily       Multiple Vitamin (MULTI-VITAMIN PO) Take by mouth daily        rosuvastatin (CRESTOR) 40 MG tablet Take 1 tablet (40 mg) by mouth daily 90 tablet 3     sotalol (BETAPACE) 80 MG tablet Take 1 tablet by mouth 2 times daily.       ciclopirox (LOPROX) 0.77 % cream as needed        ketoconazole (NIZORAL) 2 % cream APPLY TOPICALLY 2 TIMES DAILY (Patient not taking: Reported on 11/21/2017) 15 g 0     Nitroglycerin (NITROSTAT SL) Place 0.4 mg under the tongue.       triamcinolone (KENALOG) 0.5 % cream Apply sparingly to affected area three times daily. (Patient not taking: Reported on 11/21/2017) 30 g 5     Allergies   Allergen Reactions     Atorvastatin      Lipitor--muscle aches     Recent Labs   Lab Test  01/15/18   0756  08/17/17   0744  09/20/16   0938  10/28/13  11/30/12   0831   LDL  50  85  83   < >  89  87   HDL  60  50  68   < >  82  67   TRIG  97  80  53   < >  94  72   ALT  27  <5*  30   < >   --   61   CR   --    --   0.80   --    --   1.00   GFRESTIMATED   --    --   >90   --    --   75    GFRESTBLACK   --    --   >90   --    --   >90   POTASSIUM   --    --   4.4   --   4.5  4.2   TSH  2.43   --   1.07   < >   --   2.55    < > = values in this interval not displayed.      BP Readings from Last 3 Encounters:   10/16/18 110/64   11/21/17 102/60   08/30/17 118/70    Wt Readings from Last 3 Encounters:   10/16/18 192 lb (87.1 kg)   11/21/17 190 lb 8 oz (86.4 kg)   08/30/17 193 lb (87.5 kg)                  Labs reviewed in EPIC    Reviewed and updated as needed this visit by clinical staff  Tobacco  Meds  Med Hx  Surg Hx  Fam Hx  Soc Hx      Reviewed and updated as needed this visit by Provider         ROS: has Elevated prostate specific antigen (PSA); Hypothyroidism; Atrial fibrillation (H); Insomnia; Gastroesophageal reflux disease without esophagitis; Hyperlipidemia; Impotence of organic origin; Urticaria; Arthropathy, lower leg; Advance Care Planning; Hyperlipidemia; Coronary artery disease; Tinea corporis; Pure hypercholesterolemia; SUKH (obstructive sleep apnea); Benign paroxysmal positional vertigo, right; and Sciatica without back pain, left on his problem list.    Constitutional, HEENT, cardiovascular, pulmonary, GI, , musculoskeletal, neuro, skin, endocrine and psych systems are negative, except as otherwise noted.    OBJECTIVE:     /64 (Cuff Size: Adult Regular)  Pulse 57  Temp 96.6  F (35.9  C) (Tympanic)  Resp 16  Wt 192 lb (87.1 kg)  SpO2 96%  BMI 29.63 kg/m2  Body mass index is 29.63 kg/(m^2).  GENERAL: healthy, alert and no distress  EYES: Eyes grossly normal to inspection, PERRL and conjunctivae and sclerae normal  HENT: ear canals and TM's normal, nose and mouth without ulcers or lesions  NECK: no adenopathy, no asymmetry, masses, or scars and thyroid normal to palpation  RESP: lungs clear to auscultation - no rales, rhonchi or wheezes  CV: regular rate and rhythm, normal S1 S2, no S3 or S4, no murmur, click or rub, no peripheral edema and peripheral pulses  strong  ABDOMEN: soft, nontender, no hepatosplenomegaly, no masses and bowel sounds normal  MS: extremities normal- no gross deformities noted  LEFT SCIATIC NOTCH AND POSTERIOR HAMSTRING PAINFUL  SHOWN TO DO APPROPRIATE STRETCHES TO RELIEVE   REFER TO [facility] ABOUT THIS     Diagnostic Test Results:  Results for orders placed or performed in visit on 01/15/18   Lipid panel reflex to direct LDL Fasting   Result Value Ref Range    Cholesterol 129 <200 mg/dL    Triglycerides 97 <150 mg/dL    HDL Cholesterol 60 >39 mg/dL    LDL Cholesterol Calculated 50 <100 mg/dL    Non HDL Cholesterol 69 <130 mg/dL   ALT   Result Value Ref Range    ALT 27 0 - 70 U/L   TSH   Result Value Ref Range    TSH 2.43 0.40 - 4.00 mU/L   Hepatitis C RNA, quantitative   Result Value Ref Range    HCV RNA Quant IU/ml HCV RNA Not Detected HCVND^HCV RNA Not Detected [IU]/mL    Log of HCV RNA Qt Not Calculated <1.2 Log IU/mL       ASSESSMENT/PLAN:           ICD-10-CM    1. Sciatica without back pain, left M54.32 [facility] PT, HAND, AND CHIROPRACTIC REFERRAL     recommended stretches   2. Need for prophylactic vaccination and inoculation against influenza Z23 FLU VACCINE, INCREASED ANTIGEN, PRESV FREE, AGE 65+ [67681]     Vaccine Administration, Initial [87888]   3. Screening PSA (prostate specific antigen) Z12.5 Prostate spec antigen screen   4. Chronic atrial fibrillation (H) I48.2    5. Acquired hypothyroidism E03.9 TSH   6. Pure hypercholesterolemia E78.00    7. Coronary artery disease involving native coronary artery of native heart without angina pectoris I25.10 Basic metabolic panel       Patient Instructions                (M54.32) Sciatica without back pain, left  Comment:  recommended stretches  Plan: [facility] PT, HAND, AND CHIROPRACTIC REFERRAL             (Z12.5) Screening PSA (prostate specific antigen)  Comment:    Plan: Prostate spec antigen screen             (I48.2) Chronic atrial fibrillation (H)  Comment:    Plan:       (E03.9) Acquired hypothyroidism  Comment:    Plan: TSH             (E78.00) Pure hypercholesterolemia  Comment:    Plan:      (I25.10) Coronary artery disease involving native coronary artery of native heart without angina pectoris  Comment:    Plan: Basic metabolic panel    .(Z23) N eed for prophylactic vaccination and inoculation against influenza  (primary encounter diagnosis)  Comment:    Plan: FLU VACCINE, INCREASED ANTIGEN, PRESV FREE, AGE        65+ [75302], Vaccine Administration, Initial         [04715]                    RINKU TONEY MD  [facility]    Injectable Influenza Immunization Documentation    1.  Is the person to be vaccinated sick today?   No    2. Does the person to be vaccinated have an allergy to a component   of the vaccine?   No  Egg Allergy Algorithm Link    3. Has the person to be vaccinated ever had a serious reaction   to influenza vaccine in the past?   No    4. Has the person to be vaccinated ever had Guillain-Barré syndrome?   No    Form completed by LME

## 2018-10-17 ENCOUNTER — TELEPHONE (OUTPATIENT)
Dept: FAMILY MEDICINE | Facility: CLINIC | Age: 73
End: 2018-10-17

## 2018-10-17 DIAGNOSIS — E03.9 ACQUIRED HYPOTHYROIDISM: ICD-10-CM

## 2018-10-17 LAB
ANION GAP SERPL CALCULATED.3IONS-SCNC: 6 MMOL/L (ref 3–14)
BUN SERPL-MCNC: 19 MG/DL (ref 7–30)
CALCIUM SERPL-MCNC: 9.1 MG/DL (ref 8.5–10.1)
CHLORIDE SERPL-SCNC: 106 MMOL/L (ref 94–109)
CO2 SERPL-SCNC: 29 MMOL/L (ref 20–32)
CREAT SERPL-MCNC: 0.71 MG/DL (ref 0.66–1.25)
GFR SERPL CREATININE-BSD FRML MDRD: >90 ML/MIN/1.7M2
GLUCOSE SERPL-MCNC: 89 MG/DL (ref 70–99)
POTASSIUM SERPL-SCNC: 4 MMOL/L (ref 3.4–5.3)
PSA SERPL-ACNC: 2.25 UG/L (ref 0–4)
SODIUM SERPL-SCNC: 141 MMOL/L (ref 133–144)
TSH SERPL DL<=0.005 MIU/L-ACNC: 0.25 MU/L (ref 0.4–4)

## 2018-10-17 RX ORDER — LEVOTHYROXINE SODIUM 88 UG/1
88 TABLET ORAL DAILY
Qty: 60 TABLET | Refills: 0 | Status: SHIPPED | OUTPATIENT
Start: 2018-10-17 | End: 2018-12-14

## 2018-10-17 NOTE — TELEPHONE ENCOUNTER
Reason for Call:  Other call back    Detailed comments: Jonathon received an email to call for thyroid medication adjustment. Please return a call.  Currently he is taking 100mcg of levothyroxine.    Phone Number Patient can be reached at: Cell number on file:    Telephone Information:   Mobile [phone]       Best Time: any    Can we leave a detailed message on this number? YES    Call taken on 10/17/2018 at 4:48 PM by Ifrah Stapleton

## 2018-10-17 NOTE — TELEPHONE ENCOUNTER
Levothyroxine dose too high, decrease to 88 mcg every day, updated rx sent to patient's pharmacy. Will recheck thyroid labs when he sees [facility] in November.

## 2018-10-17 NOTE — TELEPHONE ENCOUNTER
Pt is returning a call from PCP yesterday. Please advice on medication dose change.    TSH 0.25 (L) 0.40 - 4.00 mU/L Final 10/16/2018  2:55 PM 67     HYROID OVER REPLACED   CALL ME FOR MEDICINE CHANGES AS SOON AS POSSIBLE   NORMAL PSA OR PROSTATE CANCER SCREENING TEST,     NORMAL GLUCOSE, RENAL AND BLOOD SALTS     RINKU TONEY JR., MD

## 2018-10-18 DIAGNOSIS — E78.00 HYPERCHOLESTEROLEMIA: ICD-10-CM

## 2018-10-18 RX ORDER — ROSUVASTATIN CALCIUM 40 MG/1
40 TABLET, COATED ORAL DAILY
Qty: 90 TABLET | Refills: 0 | Status: SHIPPED | OUTPATIENT
Start: 2018-10-18 | End: 2018-12-14

## 2018-10-25 ENCOUNTER — MYC MEDICAL ADVICE (OUTPATIENT)
Dept: CARDIOLOGY | Facility: CLINIC | Age: 73
End: 2018-10-25

## 2018-10-30 ENCOUNTER — HOSPITAL ENCOUNTER (OUTPATIENT)
Dept: CARDIOLOGY | Facility: CLINIC | Age: 73
Discharge: HOME | End: 2018-10-30
Attending: INTERNAL MEDICINE | Admitting: INTERNAL MEDICINE
Payer: COMMERCIAL

## 2018-10-30 DIAGNOSIS — E78.5 HYPERLIPIDEMIA: Primary | ICD-10-CM

## 2018-10-30 DIAGNOSIS — I25.10 ATHEROSCLEROSIS OF NATIVE CORONARY ARTERY OF NATIVE HEART WITHOUT ANGINA PECTORIS: ICD-10-CM

## 2018-10-30 DIAGNOSIS — E78.5 HYPERLIPIDEMIA: ICD-10-CM

## 2018-10-30 LAB
CHOLEST SERPL-MCNC: 111 MG/DL
HDLC SERPL-MCNC: 42 MG/DL
LDLC SERPL CALC-MCNC: 55 MG/DL
NONHDLC SERPL-MCNC: 69 MG/DL
TRIGL SERPL-MCNC: 71 MG/DL

## 2018-10-30 PROCEDURE — 93017 CV STRESS TEST TRACING ONLY: CPT

## 2018-10-30 PROCEDURE — 93321 DOPPLER ECHO F-UP/LMTD STD: CPT | Mod: 26 | Performed by: INTERNAL MEDICINE

## 2018-10-30 PROCEDURE — 80061 LIPID PANEL: CPT | Performed by: INTERNAL MEDICINE

## 2018-10-30 PROCEDURE — 93018 CV STRESS TEST I&R ONLY: CPT | Performed by: INTERNAL MEDICINE

## 2018-10-30 PROCEDURE — 93016 CV STRESS TEST SUPVJ ONLY: CPT | Performed by: INTERNAL MEDICINE

## 2018-10-30 PROCEDURE — 93350 STRESS TTE ONLY: CPT | Mod: 26 | Performed by: INTERNAL MEDICINE

## 2018-10-30 PROCEDURE — 93325 DOPPLER ECHO COLOR FLOW MAPG: CPT | Mod: 26 | Performed by: INTERNAL MEDICINE

## 2018-10-30 PROCEDURE — 25500064 ZZH RX 255 OP 636: Performed by: INTERNAL MEDICINE

## 2018-10-30 PROCEDURE — 36415 COLL VENOUS BLD VENIPUNCTURE: CPT | Performed by: INTERNAL MEDICINE

## 2018-10-30 RX ADMIN — HUMAN ALBUMIN MICROSPHERES AND PERFLUTREN 9 ML: 10; .22 INJECTION, SOLUTION INTRAVENOUS at 09:01

## 2018-11-02 ENCOUNTER — THERAPY VISIT (OUTPATIENT)
Dept: PHYSICAL THERAPY | Facility: CLINIC | Age: 73
End: 2018-11-02
Payer: COMMERCIAL

## 2018-11-02 DIAGNOSIS — M54.32 SCIATICA WITHOUT BACK PAIN, LEFT: ICD-10-CM

## 2018-11-02 DIAGNOSIS — S76.319A HAMSTRING MUSCLE STRAIN: Primary | ICD-10-CM

## 2018-11-02 PROCEDURE — 97161 PT EVAL LOW COMPLEX 20 MIN: CPT | Mod: GP | Performed by: PHYSICAL THERAPIST

## 2018-11-02 PROCEDURE — G8978 MOBILITY CURRENT STATUS: HCPCS | Mod: GP | Performed by: PHYSICAL THERAPIST

## 2018-11-02 PROCEDURE — G8979 MOBILITY GOAL STATUS: HCPCS | Mod: GP | Performed by: PHYSICAL THERAPIST

## 2018-11-02 PROCEDURE — 97110 THERAPEUTIC EXERCISES: CPT | Mod: GP | Performed by: PHYSICAL THERAPIST

## 2018-11-02 NOTE — MR AVS SNAPSHOT
After Visit Summary   11/2/2018    Jonathon Guerra    MRN: [id number]           Patient Information     Date Of Birth          1945        Visit Information        Provider Department      11/2/2018 2:50 PM Mega Galeano PT [facility] [place]        Today's Diagnoses     Hamstring muscle strain    -  1    Sciatica without back pain, left           Follow-ups after your visit        Your next 10 appointments already scheduled     Nov 09, 2018  8:15 AM CST   Return Visit with Jorge Luis Westbrook MD   [facility] ([facility])    [address] [place] [address] [address]  [facility] [address]   [phone] OPT 2            Nov 09, 2018 11:00 AM CST   MIRNA Running with Mega Galeano PT   [facility] [place] ([facility])    [address] [place] [id number] [phone]            Nov 16, 2018  1:30 PM CST   MIRNA Running with Mega Galeano PT   [facility] [place] ([facility])    [address] [place] [id number] [phone]            Nov 29, 2018  2:45 PM CST   PHYSICAL with Perez Beckett MD   [facility]    [address]  [address]  [facility] [address] [phone]              Who to contact     If you have questions or need follow up information about today's clinic visit or your schedule please contact [facility] directly at [phone].  Normal or non-critical lab and imaging results will be communicated to you by MyChart, letter or phone within 4 business days after the clinic has received the results. If you do not hear from us within 7 days, please contact the clinic through MyChart or phone. If you have a critical or abnormal lab result, we will notify you by phone as soon as  possible.  Submit refill requests through [employer] or call your pharmacy and they will forward the refill request to us. Please allow 3 business days for your refill to be completed.          Additional Information About Your Visit        [facility] Information     [employer] gives you secure access to your electronic health record. If you see a primary care provider, you can also send messages to your care team and make appointments. If you have questions, please call your primary care clinic.  If you do not have a primary care provider, please call [phone] and they will assist you.        Care EveryWhere ID     This is your Care EveryWhere ID. This could be used by other organizations to access your [facility] medical records  [id number]         Blood Pressure from Last 3 Encounters:   10/16/18 110/64   11/21/17 102/60   08/30/17 118/70    Weight from Last 3 Encounters:   10/16/18 87.1 kg (192 lb)   11/21/17 86.4 kg (190 lb 8 oz)   08/30/17 87.5 kg (193 lb)              We Performed the Following     [facility] CERT REPORT     [facility] PT, HAND, AND CHIROPRACTIC REFERRAL     PT Eval, Low Complexity (15338)     Therapeutic Exercises        Primary Care Provider Office Phone # Fax #    Perez Keeley Beckett [phone] [address] [facility] [address]        Equal Access to Services     JESSICA GARCIA : Hadii aad ku hadasho Soomaali, waaxda luqadaha, qaybta kaalmada adechristelle, alexsander downs. So [facility] [phone].    ATENCIÓN: Si habla español, tiene a schwarz disposición servicios gratuitos de asistencia lingüística. Llame al [phone].    We comply with applicable federal civil rights laws and [place] laws. We do not discriminate on the basis of race, color, national origin, age, disability, sex, sexual orientation, or gender identity.            Thank you!     Thank you for choosing [facility] [place]  for your care. Our goal is always to provide  you with excellent care. Hearing back from our patients is one way we can continue to improve our services. Please take a few minutes to complete the written survey that you may receive in the mail after your visit with us. Thank you!             Your Updated Medication List - Protect others around you: Learn how to safely use, store and throw away your medicines at www.disposemymeds.org.          This list is accurate as of 11/2/18  3:52 PM.  Always use your most recent med list.                   Brand Name Dispense Instructions for use Diagnosis    acetaminophen 650 MG 8 hour tablet      Take by mouth daily        ACETAMINOPHEN PM PO      Take 500 mg by mouth        aspirin 325 MG tablet      Take 325 mg by mouth daily.        cholecalciferol 1000 UNIT tablet    vitamin D3     Take 1 tablet by mouth daily        ciclopirox 0.77 % cream    LOPROX     as needed        ketoconazole 2 % cream    NIZORAL    15 g    APPLY TOPICALLY 2 TIMES DAILY    Tinea corporis       levothyroxine 88 MCG tablet    SYNTHROID/LEVOTHROID    60 tablet    Take 1 tablet (88 mcg) by mouth daily    Acquired hypothyroidism       MELATONIN PO      Take 10 mg by mouth At Bedtime        metoprolol succinate 25 MG 24 hr tablet    TOPROL-XL     1/2 tablet daily        MULTI-VITAMIN PO      Take by mouth daily        NITROSTAT SL      Place 0.4 mg under the tongue.        rosuvastatin 40 MG tablet    CRESTOR    90 tablet    Take 1 tablet (40 mg) by mouth daily    Hypercholesterolemia       sotalol 80 MG tablet    BETAPACE     Take 1 tablet by mouth 2 times daily.        triamcinolone 0.5 % cream    KENALOG    30 g    Apply sparingly to affected area three times daily.    Rash

## 2018-11-02 NOTE — PROGRESS NOTES
[facility] Initial Evaluation  Subjective:  hospitals                  [facility] Initial Evaluation  Subjective:  hospitals      Physical Therapy Initial Examination/Evaluation  November 2, 2018     Jonathon Guerra is a 73 year old male referred to physical therapy  for treatment of left hamstring pain with Precautions/Restrictions/MD instructions none  Pain with compression-sitting on toliet.  Feels like dragging the leg vs taking a step.  Massage-bimonthly and this has helped     Subjective:  DOI/onset: 10/2/18  Acute Injury or Gradual Onset?: Acute injury onset  Mechanism of Injury: [place]  Related PMH: overweight, heart problems Previous Treatment: Nothing   Imaging: none  Chief Complaint/Functional Limitations:   Walking and see below in therapy evaluation codes   Pain: rest 0 /10, activity 6/10 Location: posterior medial thigh Frequency: Constant Described as: aching and dull Alleviated by: Rest Progression of Symptoms: Gradually getting better. Time of day when pain is worse: Activity related and Position related  Sleeping: No issues/uninterrupted   Occupation: pt is a , inside [place]  Job duties: prolonged standing, prolonged walking  Current HEP/exercise regimen: walking  Patient's goals are see chief complaints return to walking     Other pertinent PMH/Red Flags: Heart Problems, Overweight   Barriers at home/work: None as reported by patient  Pertinent Surgical History: not at this time  Medications: None as reported by patient  General health as reported by patient: good  Return to MD:  not at this time       System     Physical Exam     General      ROS     Objective:  System  Obs: ecchymosis-distal left hamstring  No tenderness to palpation or divot  Pain with resisted left hamstring   Hip ROM is full and painfree  Fluid gait  2L squat good form no sxs  Negative SLR  SI screen negative  Lumbar screen negative  Physical  Exam    General     ROS    Assessment/Plan:    Patient is a 73 year old male with left side hip complaints.    Patient has the following significant findings with corresponding treatment plan.                Diagnosis 1:  Left hip pain/hamstring  Pain -  hot/cold therapy, manual therapy, self management, education and home program  Decreased strength - therapeutic exercise and therapeutic activities  Decreased function - therapeutic activities    Therapy Evaluation Codes:   1) History comprised of:   Personal factors that impact the plan of care:      None.    Comorbidity factors that impact the plan of care are:      None.     Medications impacting care: None.  2) Examination of Body Systems comprised of:   Body structures and functions that impact the plan of care:      Hip.   Activity limitations that impact the plan of care are:      Standing and Walking.  3) Clinical presentation characteristics are:   Stable/Uncomplicated.  4) Decision-Making    Low complexity using standardized patient assessment instrument and/or measureable assessment of functional outcome.  Cumulative Therapy Evaluation is: Low complexity.    Previous and current functional limitations:  (See Goal Flow Sheet for this information)    Short term and Long term goals: (See Goal Flow Sheet for this information)     Communication ability:  Patient appears to be able to clearly communicate and understand verbal and written communication and follow directions correctly.  Treatment Explanation - The following has been discussed with the patient:   RX ordered/plan of care  Anticipated outcomes  Possible risks and side effects  This patient would benefit from PT intervention to resume normal activities.   Rehab potential is good.    Frequency:  1 X week, once daily  Duration:  for 8 weeks  Discharge Plan:  Achieve all LTG.  Independent in home treatment program.  Reach maximal therapeutic benefit.    Please refer to the daily flowsheet for treatment  today, total treatment time and time spent performing 1:1 timed codes.

## 2018-11-02 NOTE — LETTER
DEPARTMENT OF HEALTH AND HUMAN SERVICES  CENTERS FOR MEDICARE & MEDICAID SERVICES    PLAN/UPDATED PLAN OF PROGRESS FOR OUTPATIENT REHABILITATION    PATIENTS NAME:  Jonathon Guerra   : 1945  PROVIDER NUMBER:    [id number]  [facility]   [facility]  PROVIDER NAME: [place] [facility] [employer] [place]  MEDICAL RECORD NUMBER: [id number]   START OF CARE DATE:  SOC Date: 18   TYPE:  PT  PRIMARY/TREATMENT DIAGNOSIS: (Pertinent Medical Diagnosis)   Sciatica without back pain, left  Hamstring muscle strain    VISITS FROM START OF CARE:  Rxs Used: 1     [facility] Initial Evaluation  Subjective:  HPI                  [facility] Initial Evaluation  Subjective:  [facility]      Physical Therapy Initial Examination/Evaluation  2018     Jonathon Guerra is a 73 year old male referred to physical therapy  for treatment of left hamstring pain with Precautions/Restrictions/MD instructions none  Pain with compression-sitting on toliet.  Feels like dragging the leg vs taking a step.  Massage-bimonthly and this has helped     Subjective:  DOI/onset: 10/2/18  Acute Injury or Gradual Onset?: Acute injury onset  Mechanism of Injury: [place]  Related PMH: overweight, heart problems Previous Treatment: Nothing   Imaging: none  Chief Complaint/Functional Limitations:   Walking and see below in therapy evaluation codes   Pain: rest 0 /10, activity 6/10 Location: posterior medial thigh Frequency: Constant Described as:  aching and dull Alleviated by:  Rest Progression of Symptoms: Gradually getting better. Time of day when pain is worse:  Activity related and Position related  Sleeping:  No issues/uninterrupted   Occupation: pt is a , [place]  Job duties:  prolonged standing, prolonged walking  Current HEP/exercise regimen:  walking  Patient's goals are see chief complaints return to walking     Other pertinent PMH/Red Flags:   Heart Problems, Overweight   Barriers at home/work: None as reported by patient  Pertinent Surgical History:  not at this time  Medications:  None as reported by patient  General health as reported by patient:  good  Return to MD:   not at this time       System   Physical Exam   General    ROS     Objective:  System  Obs: ecchymosis-distal left hamstring  No tenderness to palpation or divot  Pain with resisted left hamstring   Hip ROM is full and painfree  Fluid gait  2L squat good form no sxs  Negative SLR  SI screen negative  Lumbar screen negative  Physical Exam    General   ROS    Assessment/Plan:    Patient is a 73 year old male with left side hip complaints.    Patient has the following significant findings with corresponding treatment plan.                Diagnosis 1:  Left hip pain/hamstring  Pain -  hot/cold therapy, manual therapy, self management, education and home program  Decreased strength - therapeutic exercise and therapeutic activities  Decreased function - therapeutic activities    Therapy Evaluation Codes:   1) History comprised of:   Personal factors that impact the plan of care:      None.    Comorbidity factors that impact the plan of care are:      None.     Medications impacting care: None.  2) Examination of Body Systems comprised of:   Body structures and functions that impact the plan of care:      Hip.   Activity limitations that impact the plan of care are:      Standing and Walking.  3) Clinical presentation characteristics are:   Stable/Uncomplicated.  4) Decision-Making    Low complexity using standardized patient assessment instrument and/or measureable assessment of functional outcome.  Cumulative Therapy Evaluation is: Low complexity.    Previous and current functional limitations:  (See Goal Flow Sheet for this information)    Short term and Long term goals: (See Goal Flow Sheet for this information)     Communication ability:  Patient appears to be able to clearly communicate  "and understand verbal and written communication and follow directions correctly.  Treatment Explanation - The following has been discussed with the patient:   RX ordered/plan of care  Anticipated outcomes  Possible risks and side effects  This patient would benefit from PT intervention to resume normal activities.   Rehab potential is good.    Frequency:  1 X week, once daily  Duration:  for 8 weeks  Discharge Plan:  Achieve all LTG.  Independent in home treatment program.  Reach maximal therapeutic benefit.      Caregiver Signature/Credentials _____________________________ Date ________       Treating Provider: Mega Galeano PT     I have reviewed and certified the need for these services and plan of treatment while under my care.        PHYSICIAN'S SIGNATURE:   _________________________________________  Date___________   Perez Beckett MD    Certification period:  Beginning of Cert date period: 11/02/18 to  End of Cert period date: 01/30/19     Functional Level Progress Report: Please see attached \"Goal Flow sheet for Functional level.\"    ____X____ Continue Services or       ________ DC Services                Service dates: From  SOC Date: 11/02/18 date to present                         "

## 2018-11-09 ENCOUNTER — THERAPY VISIT (OUTPATIENT)
Dept: PHYSICAL THERAPY | Facility: CLINIC | Age: 73
End: 2018-11-09
Payer: COMMERCIAL

## 2018-11-09 ENCOUNTER — OFFICE VISIT (OUTPATIENT)
Dept: CARDIOLOGY | Facility: CLINIC | Age: 73
End: 2018-11-09
Payer: COMMERCIAL

## 2018-11-09 ENCOUNTER — TELEPHONE (OUTPATIENT)
Dept: FAMILY MEDICINE | Facility: CLINIC | Age: 73
End: 2018-11-09

## 2018-11-09 VITALS
DIASTOLIC BLOOD PRESSURE: 66 MMHG | BODY MASS INDEX: 27.89 KG/M2 | HEIGHT: 68 IN | SYSTOLIC BLOOD PRESSURE: 98 MMHG | HEART RATE: 68 BPM | WEIGHT: 184 LBS

## 2018-11-09 DIAGNOSIS — I25.10 ATHEROSCLEROSIS OF NATIVE CORONARY ARTERY OF NATIVE HEART WITHOUT ANGINA PECTORIS: Primary | ICD-10-CM

## 2018-11-09 DIAGNOSIS — I48.0 PAROXYSMAL ATRIAL FIBRILLATION (H): ICD-10-CM

## 2018-11-09 DIAGNOSIS — E78.00 PURE HYPERCHOLESTEROLEMIA: ICD-10-CM

## 2018-11-09 DIAGNOSIS — S76.319A HAMSTRING MUSCLE STRAIN: ICD-10-CM

## 2018-11-09 PROCEDURE — 97140 MANUAL THERAPY 1/> REGIONS: CPT | Mod: GP | Performed by: PHYSICAL THERAPIST

## 2018-11-09 PROCEDURE — 97110 THERAPEUTIC EXERCISES: CPT | Mod: GP | Performed by: PHYSICAL THERAPIST

## 2018-11-09 PROCEDURE — 99214 OFFICE O/P EST MOD 30 MIN: CPT | Performed by: INTERNAL MEDICINE

## 2018-11-09 NOTE — PROGRESS NOTES
HPI and Plan:   See dictation    Orders Placed This Encounter   Procedures     Lipid Profile     ALT     Follow-Up with Cardiologist     Exercise Stress Echocardiogram       No orders of the defined types were placed in this encounter.      Medications Discontinued During This Encounter   Medication Reason     ciclopirox (LOPROX) 0.77 % cream Therapy completed     ketoconazole (NIZORAL) 2 % cream Therapy completed     triamcinolone (KENALOG) 0.5 % cream Therapy completed         Encounter Diagnoses   Name Primary?     Atherosclerosis of native coronary artery of native heart without angina pectoris Yes     Pure hypercholesterolemia      Paroxysmal atrial fibrillation (H)        CURRENT MEDICATIONS:  Current Outpatient Prescriptions   Medication Sig Dispense Refill     Acetaminophen 650 MG TABS Take by mouth daily       aspirin 325 MG tablet Take 325 mg by mouth daily.       cholecalciferol (VITAMIN D3) 1000 UNIT tablet Take 1 tablet by mouth daily        levothyroxine (SYNTHROID/LEVOTHROID) 88 MCG tablet Take 1 tablet (88 mcg) by mouth daily 60 tablet 0     MELATONIN PO Take 10 mg by mouth At Bedtime        metoprolol (TOPROL-XL) 25 MG 24 hr tablet 1/2 tablet daily       Multiple Vitamin (MULTI-VITAMIN PO) Take by mouth daily        rosuvastatin (CRESTOR) 40 MG tablet Take 1 tablet (40 mg) by mouth daily 90 tablet 0     sotalol (BETAPACE) 80 MG tablet Take 1 tablet by mouth 2 times daily.       Diphenhydramine-APAP, sleep, (ACETAMINOPHEN PM PO) Take 500 mg by mouth       Nitroglycerin (NITROSTAT SL) Place 0.4 mg under the tongue.         ALLERGIES     Allergies   Allergen Reactions     Atorvastatin      Lipitor--muscle aches       PAST MEDICAL HISTORY:  Past Medical History:   Diagnosis Date     Atrial fibrillation (H)      Coronary artery disease      Hyperlipidemia      Hypertension      Thyroid disease        PAST SURGICAL HISTORY:  Past Surgical History:   Procedure Laterality Date     HERNIA REPAIR, INGUINAL  RT/LT       OTHER SURGICAL HISTORY      upper and lower partial plate       FAMILY HISTORY:  Family History   Problem Relation Age of Onset     Cancer Father      prostate     Cancer Brother      Other - See Comments Mother      old age       SOCIAL HISTORY:  Social History     Social History     Marital status: Single     Spouse name: N/A     Number of children: N/A     Years of education: N/A     Social History Main Topics     Smoking status: Former Smoker     Start date: 1/30/1966     Quit date: 11/30/1992     Smokeless tobacco: Never Used     Alcohol use No     Drug use: No     Sexual activity: Yes     Partners: Male     Other Topics Concern     Parent/Sibling W/ Cabg, Mi Or Angioplasty Before 65f 55m? No     Caffeine Concern No     Sleep Concern No     Special Diet No     Exercise Yes     running or walking      Seat Belt No     Social History Narrative    --------------------------------------------------------------------------------    Surgical History  Return To Top     Status Surgery Time Frame Comment Record Date     Inactive  inguinal hernia      8/26/2008      Inactive  Surgery  UPPER AND LOWER PARTIAL PLATE    8/26/2008          --------------------------------------------------------------------------------    Food Allergy  Return To Top     Allergen Reaction Comment Record Date     * No known food allergies      8/26/2008          --------------------------------------------------------------------------------    Drug Allergy  Return To Top     Allergen Reaction Comment Record Date     Penicillin      5/6/2009      LIPITOR  MUSCLE ACHES    6/5/2007          --------------------------------------------------------------------------------    Environment Allergy  Return To Top     Allergen Reaction Comment Record Date     * No known environmental allergies  RASH    8/26/2008          --------------------------------------------------------------------------------    Immunization History Return To Top      Funding Source Vaccine Type of Vaccine Date Given Route Site Given Lot#  Exp. Date Date on VIS Date Given VIS Vaccinator Note       Hepatitis A #1 (Adult)  HepA - Adult  6/6/2007  IM  Left Deltoid  [id number]  GSK    03/21/2006 06/06/2007  LUIS ALFREDO Jean CMA          Hepatitis A #2(Adult)  HepA - Adult  8/26/2008  IM  Rightt Deltoid  [id number]  GSK  9/4/2010 03/21/2006 8/26/2008  KODI Love          Hepatitis B #1 (Adult)  HepB -Adult  6/6/2007  IM  Right Deltoid  [id number]  GSK    07/11/2001 06/06/2007  LUIS ALFREDO Jean CMA          Hepatitis B #2 (Adult)  HepB -Adult  8/26/2008 8/26/2008            Herpes Zoster (Zostavax) age 60 +  Herpes Zoster  8/26/2008 8/26/2008            Herpes Zoster (Zostavax) age 60 +  Herpes Zoster  9/2/08  SQ  Right Deltoid  [id number]  MRK  7/25/09 9/11/06 9/2/08  [facility]        Private; Private  Influenza (Adult) Seasonal  Flu (>= 3yrs)  9/23/2009  IM; IM  Left Deltoid; Left Deltoid  [id number]  SP; SP  06/30/2010 8/11/2009 9/23/2009  NEISHA Solorzano LPN            --------------------------------------------------------------------------------    Social History  Return To Top     Question Answer Comment Record Date     Advance Directive or Living Will  Form Given to Patient    8/26/2008      Emotional Abuse  No    1/20/2012      Exercise  Yes    8/26/2008      Physical Abuse  No    1/20/2012      Sexual Abuse  No    1/20/2012      Tobacco history  Has never smoked or chewed tobacco    8/26/2008      Alcohol history  Never drinks alcohol    8/26/2008      Has the patient ever used illegal drugs?  Has never used illegal drugs    1/20/2012          --------------------------------------------------------------------------------    History - Overall Remark: 1/20/2012 Return To Top     MEDICATIONS (including any changes made today):    1. Fish Oil 1200 mg, 1 p.o. twice daily    2. Niacin 500 mg Tablet, 1 twice daily    3. Vitamin B Complex Tablet, 1 p.o. daily     4. Levothyroxine 125 mcg Tablet, 1 p.o. daily    5. Aspirin 325 mg Tablet, 1 p.o. daily    6. Red Yeast Rice Extract 600 mg Capsule, 1200mg po BID    7. Nitroglycerin 0.4 mg Tablet, Sublingual, as directed for chest pain    8. Toprol XL 25 mg Tablet Sustained Release 24 hr, 1 p.o. twice daily    9. Centrum Silver Tablet, 1 p.o. daily    10. Calcium And Magnesium 750-465mg Tablet, 1 p.o. daily    11. Testosterone 50 mg/mI, Pt use 90mg    12. Tikosyn 250 mcg Capsule, 2 p.o. twice daily    13. Trazodone 50mg Tablet, 2 po at HS daily PRN sleep    --------------------------------------------------------------------------------    Medical History Return To Top     Status Diagnosis Time Frame Comment Record Date     Active (466.0) - C - Bronchitis, acute      1/20/2012      Active (790.93) - C - Elevated PSA      6/23/2011      Active (V74.5) - C - Screening, STD      6/23/2011      Active (244.9) - C - Hypothyroidism      10/22/2010      Active (V76.44) - C - Screening, prostate      8/26/2010      Active (380.4) - C - Ceruminosis      8/26/2010      Active (427.31) - C - Atrial fibrillation      8/26/2010      Active 780.52 Insomnia      5/26/2009      Active (427.31) - C - Atrial fibrillation      5/26/2009      Active (530.81) - C - GERD      5/6/2009      Active 427.31 Atrial fibrillation      5/4/2009      Active 719.44 Hand pain      1/22/2009      Active (719.44) - C - Hand pain      1/21/2009      Active 780.79 Fatigue      9/2/2008      Active V05.8 Immunization, other, specified      9/2/2008      Active (272.4) - C - Hyperlipidemia      8/26/2008      Active (V05.8) - C - Immunization, other, specified      8/26/2008      Active (607.84) - C - Erectile Dysfunction      8/26/2008      Active 708.9 UNSP URTICARIA      6/6/2007      Active V05.3 Hepatitis vaccination      6/6/2007      Active 716.96 ARTHROPATHY UNSP LEG      6/6/2007      Active (V58.69) - C - Other medication management      5/29/2007       "Active (V70.0) - C - Routine medical exam      5/29/2007      Active (V76.51) - C - Screening, colon      5/29/2007      Inactive  (427.31) - C - Atrial fibrillation    GOOD CONTROL NOW CONVERTED LAST THURSDAY 7/22/2010      Inactive  (V04.81) - C - Influenza vaccination      9/23/2009      Inactive  (427.31) - C - Atrial fibrillation      5/6/2009      Inactive  380.4 Ceruminosis    LEFT EAR  4/8/2009      Inactive  V74.5 Screening, STD      4/8/2009      Inactive  466.0 Bronchitis, acute      4/8/2009                 Review of Systems:  Skin:  Negative rash     Eyes:  Positive for glasses    ENT:  Positive for tinnitus;vertigo    Respiratory:  Positive for sleep apnea (no c-pap use)     Cardiovascular:  Negative      Gastroenterology: Negative      Genitourinary:  Positive for nocturia (2 x per hs)    Musculoskeletal:  Positive for arthritis (thumbs)  (Left hamstring injury.)  Neurologic:  Negative      Psychiatric:  Negative      Heme/Lymph/Imm:  Positive for allergies    Endocrine:  Positive for thyroid disorder      Physical Exam:  Vitals: BP 98/66  Pulse 68  Ht 1.715 m (5' 7.5\")  Wt 83.5 kg (184 lb)  BMI 28.39 kg/m2    Constitutional:  cooperative, alert and oriented, well developed, well nourished, in no acute distress        Skin:  warm and dry to the touch, no apparent skin lesions or masses noted          Head:  normocephalic, no masses or lesions        Eyes:  pupils equal and round, conjunctivae and lids unremarkable, sclera white, no xanthalasma, EOMS intact, no nystagmus        Lymph:      ENT:  no pallor or cyanosis, dentition good        Neck:  carotid pulses are full and equal bilaterally, JVP normal, no carotid bruit        Respiratory:  normal breath sounds, clear to auscultation, normal A-P diameter, normal symmetry, normal respiratory excursion, no use of accessory muscles         Cardiac: regular rhythm, normal S1/S2, no S3 or S4, apical impulse not displaced, no murmurs, gallops or rubs   "              pulses full and equal, no bruits auscultated                                        GI:  abdomen soft;BS normoactive        Extremities and Muscular Skeletal:  no deformities, clubbing, cyanosis, erythema observed;no edema              Neurological:  no gross motor deficits;affect appropriate        Psych:  Alert and Oriented x 3        CC  Perez Beckett MD  [id number] [place] [address]

## 2018-11-09 NOTE — LETTER
11/9/2018    RINKU TONEY MD  [address]  [facility] [address]    RE: Jonathon Guerra       Dear Colleague,    I had the pleasure of seeing Jonathon Guerra in the [facility].    HPI and Plan:   See dictation    Orders Placed This Encounter   Procedures     Lipid Profile     ALT     Follow-Up with Cardiologist     Exercise Stress Echocardiogram       No orders of the defined types were placed in this encounter.      Medications Discontinued During This Encounter   Medication Reason     ciclopirox (LOPROX) 0.77 % cream Therapy completed     ketoconazole (NIZORAL) 2 % cream Therapy completed     triamcinolone (KENALOG) 0.5 % cream Therapy completed         Encounter Diagnoses   Name Primary?     Atherosclerosis of native coronary artery of native heart without angina pectoris Yes     Pure hypercholesterolemia      Paroxysmal atrial fibrillation (H)        CURRENT MEDICATIONS:  Current Outpatient Prescriptions   Medication Sig Dispense Refill     Acetaminophen 650 MG TABS Take by mouth daily       aspirin 325 MG tablet Take 325 mg by mouth daily.       cholecalciferol (VITAMIN D3) 1000 UNIT tablet Take 1 tablet by mouth daily        levothyroxine (SYNTHROID/LEVOTHROID) 88 MCG tablet Take 1 tablet (88 mcg) by mouth daily 60 tablet 0     MELATONIN PO Take 10 mg by mouth At Bedtime        metoprolol (TOPROL-XL) 25 MG 24 hr tablet 1/2 tablet daily       Multiple Vitamin (MULTI-VITAMIN PO) Take by mouth daily        rosuvastatin (CRESTOR) 40 MG tablet Take 1 tablet (40 mg) by mouth daily 90 tablet 0     sotalol (BETAPACE) 80 MG tablet Take 1 tablet by mouth 2 times daily.       Diphenhydramine-APAP, sleep, (ACETAMINOPHEN PM PO) Take 500 mg by mouth       Nitroglycerin (NITROSTAT SL) Place 0.4 mg under the tongue.         ALLERGIES     Allergies   Allergen Reactions     Atorvastatin      Lipitor--muscle aches       PAST MEDICAL HISTORY:  Past Medical History:   Diagnosis Date      Atrial fibrillation (H)      Coronary artery disease      Hyperlipidemia      Hypertension      Thyroid disease        PAST SURGICAL HISTORY:  Past Surgical History:   Procedure Laterality Date     HERNIA REPAIR, INGUINAL RT/LT       OTHER SURGICAL HISTORY      upper and lower partial plate       FAMILY HISTORY:  Family History   Problem Relation Age of Onset     Cancer Father      prostate     Cancer Brother      Other - See Comments Mother      old age       SOCIAL HISTORY:  Social History     Social History     Marital status: Single     Spouse name: N/A     Number of children: N/A     Years of education: N/A     Social History Main Topics     Smoking status: Former Smoker     Start date: 1/30/1966     Quit date: 11/30/1992     Smokeless tobacco: Never Used     Alcohol use No     Drug use: No     Sexual activity: Yes     Partners: Male     Other Topics Concern     Parent/Sibling W/ Cabg, Mi Or Angioplasty Before 65f 55m? No     Caffeine Concern No     Sleep Concern No     Special Diet No     Exercise Yes     running or walking      Seat Belt No     Social History Narrative    --------------------------------------------------------------------------------    Surgical History  Return To Top     Status Surgery Time Frame Comment Record Date     Inactive  inguinal hernia      8/26/2008      Inactive  Surgery  UPPER AND LOWER PARTIAL PLATE    8/26/2008          --------------------------------------------------------------------------------    Food Allergy  Return To Top     Allergen Reaction Comment Record Date     * No known food allergies      8/26/2008          --------------------------------------------------------------------------------    Drug Allergy  Return To Top     Allergen Reaction Comment Record Date     Penicillin      5/6/2009      LIPITOR  MUSCLE ACHES    6/5/2007          --------------------------------------------------------------------------------    Environment Allergy  Return To Top      Allergen Reaction Comment Record Date     * No known environmental allergies  RASH    8/26/2008          --------------------------------------------------------------------------------    Immunization History Return To Top     Funding Source Vaccine Type of Vaccine Date Given Route Site Given Lot#  Exp. Date Date on VIS Date Given VIS Vaccinator Note       Hepatitis A #1 (Adult)  HepA - Adult  6/6/2007  IM  Left Deltoid  [id number]  [facility]    03/21/2006 06/06/2007  LUIS ALFREDO [facility]          Hepatitis A #2(Adult)  HepA - Adult  8/26/2008  IM  Rightt Deltoid  [id number]  [facility]  9/4/2010 03/21/2006 8/26/2008  [facility]          Hepatitis B #1 (Adult)  HepB -Adult  6/6/2007  IM  Right Deltoid  [id number]  [facility]    07/11/2001 06/06/2007  LUIS ALFREDO [facility]          Hepatitis B #2 (Adult)  HepB -Adult  8/26/2008 8/26/2008            Herpes Zoster (Zostavax) age 60 +  Herpes Zoster  8/26/2008 8/26/2008            Herpes Zoster (Zostavax) age 60 +  Herpes Zoster  9/2/08  SQ  Right Deltoid  [id number]  MRK  7/25/09 9/11/06 9/2/08  [facility]        Private; Private  Influenza (Adult) Seasonal  Flu (>= 3yrs)  9/23/2009  IM; IM  Left Deltoid; Left Deltoid  [id number]  SP; SP  06/30/2010 8/11/2009 9/23/2009  NEISHA Solorzano LPN            --------------------------------------------------------------------------------    Social History  Return To Top     Question Answer Comment Record Date     Advance Directive or Living Will  Form Given to Patient    8/26/2008      Emotional Abuse  No    1/20/2012      Exercise  Yes    8/26/2008      Physical Abuse  No    1/20/2012      Sexual Abuse  No    1/20/2012      Tobacco history  Has never smoked or chewed tobacco    8/26/2008      Alcohol history  Never drinks alcohol    8/26/2008      Has the patient ever used illegal drugs?  Has never used illegal drugs    1/20/2012          --------------------------------------------------------------------------------    History  - Overall Remark: 1/20/2012 Return To Top     MEDICATIONS (including any changes made today):    1. Fish Oil 1200 mg, 1 p.o. twice daily    2. Niacin 500 mg Tablet, 1 twice daily    3. Vitamin B Complex Tablet, 1 p.o. daily    4. Levothyroxine 125 mcg Tablet, 1 p.o. daily    5. Aspirin 325 mg Tablet, 1 p.o. daily    6. Red Yeast Rice Extract 600 mg Capsule, 1200mg po BID    7. Nitroglycerin 0.4 mg Tablet, Sublingual, as directed for chest pain    8. Toprol XL 25 mg Tablet Sustained Release 24 hr, 1 p.o. twice daily    9. Centrum Silver Tablet, 1 p.o. daily    10. Calcium And Magnesium 750-465mg Tablet, 1 p.o. daily    11. Testosterone 50 mg/mI, Pt use 90mg    12. Tikosyn 250 mcg Capsule, 2 p.o. twice daily    13. Trazodone 50mg Tablet, 2 po at HS daily PRN sleep    --------------------------------------------------------------------------------    Medical History Return To Top     Status Diagnosis Time Frame Comment Record Date     Active (466.0) - C - Bronchitis, acute      1/20/2012      Active (790.93) - C - Elevated PSA      6/23/2011      Active (V74.5) - C - Screening, STD      6/23/2011      Active (244.9) - C - Hypothyroidism      10/22/2010      Active (V76.44) - C - Screening, prostate      8/26/2010      Active (380.4) - C - Ceruminosis      8/26/2010      Active (427.31) - C - Atrial fibrillation      8/26/2010      Active 780.52 Insomnia      5/26/2009      Active (427.31) - C - Atrial fibrillation      5/26/2009      Active (530.81) - C - GERD      5/6/2009      Active 427.31 Atrial fibrillation      5/4/2009      Active 719.44 Hand pain      1/22/2009      Active (719.44) - C - Hand pain      1/21/2009      Active 780.79 Fatigue      9/2/2008      Active V05.8 Immunization, other, specified      9/2/2008      Active (272.4) - C - Hyperlipidemia      8/26/2008      Active (V05.8) - C - Immunization, other, specified      8/26/2008      Active (607.84) - C - Erectile Dysfunction      8/26/2008       "Active 708.9 UNSP URTICARIA      6/6/2007      Active V05.3 Hepatitis vaccination      6/6/2007      Active 716.96 ARTHROPATHY UNSP LEG      6/6/2007      Active (V58.69) - C - Other medication management      5/29/2007      Active (V70.0) - C - Routine medical exam      5/29/2007      Active (V76.51) - C - Screening, colon      5/29/2007      Inactive  (427.31) - C - Atrial fibrillation    GOOD CONTROL NOW CONVERTED LAST THURSDAY 7/22/2010      Inactive  (V04.81) - C - Influenza vaccination      9/23/2009      Inactive  (427.31) - C - Atrial fibrillation      5/6/2009      Inactive  380.4 Ceruminosis    LEFT EAR  4/8/2009      Inactive  V74.5 Screening, STD      4/8/2009      Inactive  466.0 Bronchitis, acute      4/8/2009                 Review of Systems:  Skin:  Negative rash     Eyes:  Positive for glasses    ENT:  Positive for tinnitus;vertigo    Respiratory:  Positive for sleep apnea (no c-pap use)     Cardiovascular:  Negative      Gastroenterology: Negative      Genitourinary:  Positive for nocturia (2 x per hs)    Musculoskeletal:  Positive for arthritis (thumbs)  (Left hamstring injury.)  Neurologic:  Negative      Psychiatric:  Negative      Heme/Lymph/Imm:  Positive for allergies    Endocrine:  Positive for thyroid disorder      Physical Exam:  Vitals: BP 98/66  Pulse 68  Ht 1.715 m (5' 7.5\")  Wt 83.5 kg (184 lb)  BMI 28.39 kg/m2    Constitutional:  cooperative, alert and oriented, well developed, well nourished, in no acute distress        Skin:  warm and dry to the touch, no apparent skin lesions or masses noted          Head:  normocephalic, no masses or lesions        Eyes:  pupils equal and round, conjunctivae and lids unremarkable, sclera white, no xanthalasma, EOMS intact, no nystagmus        Lymph:      ENT:  no pallor or cyanosis, dentition good        Neck:  carotid pulses are full and equal bilaterally, JVP normal, no carotid bruit        Respiratory:  normal breath sounds, clear to " auscultation, normal A-P diameter, normal symmetry, normal respiratory excursion, no use of accessory muscles         Cardiac: regular rhythm, normal S1/S2, no S3 or S4, apical impulse not displaced, no murmurs, gallops or rubs                pulses full and equal, no bruits auscultated                                        GI:  abdomen soft;BS normoactive        Extremities and Muscular Skeletal:  no deformities, clubbing, cyanosis, erythema observed;no edema              Neurological:  no gross motor deficits;affect appropriate        Psych:  Alert and Oriented x 3        CC  Perez Beckett MD  [address] [place] [address]                Thank you for allowing me to participate in the care of your patient.      Sincerely,     GEORGE GUERRERO MD     [facility] [place]    cc:   Perez Beckett MD  [address] [place] [address]

## 2018-11-09 NOTE — MR AVS SNAPSHOT
After Visit Summary   11/9/2018    Jonathon Guerra    MRN: [id number]           Patient Information     Date Of Birth          1945        Visit Information        Provider Department      11/9/2018 8:15 AM Jorge Luis Westbrook MD [facility]   [place]        Today's Diagnoses     Paroxysmal atrial fibrillation (H)    -  1    Atherosclerosis of native coronary artery of native heart without angina pectoris        Pure hypercholesterolemia           Follow-ups after your visit        Additional Services     Follow-Up with Cardiologist                 Your next 10 appointments already scheduled     Nov 09, 2018 11:00 AM CST   MIRNA Running with Mega Galeano PT   [facility] ([facility])    [facility] [address]   [phone]            Nov 16, 2018  1:30 PM CST   MIRNA Running with Mega Galeano PT   [facility] ([facility])    [facility] [address]   [phone]            Nov 29, 2018  2:45 PM CST   PHYSICAL with Perez Beckett MD   [facility] ([facility]    [address] [place] [address] [phone]              Future tests that were ordered for you today     Open Future Orders        Priority Expected Expires Ordered    Lipid Profile Routine 11/9/2019 11/9/2019 11/9/2018    ALT Routine 11/9/2019 11/9/2019 11/9/2018    Exercise Stress Echocardiogram Routine 11/9/2019 11/10/2019 11/9/2018    Follow-Up with Cardiologist Routine 11/9/2019 11/10/2019 11/9/2018            Who to contact     If you have questions or need follow up information about today's clinic visit or your schedule please contact [facility] directly at [phone].  Normal or non-critical lab and imaging  "results will be communicated to you by MyChart, letter or phone within 4 business days after the clinic has received the results. If you do not hear from us within 7 days, please contact the clinic through [employer] or phone. If you have a critical or abnormal lab result, we will notify you by phone as soon as possible.  Submit refill requests through Maizhuo or call your pharmacy and they will forward the refill request to us. Please allow 3 business days for your refill to be completed.          Additional Information About Your Visit        CL3VERharPerspecSys Information     Maizhuo gives you secure access to your electronic health record. If you see a primary care provider, you can also send messages to your care team and make appointments. If you have questions, please call your primary care clinic.  If you do not have a primary care provider, please call [phone] and they will assist you.        Care EveryWhere ID     This is your Care EveryWhere ID. This could be used by other organizations to access your [facility] medical records  [id number]        Your Vitals Were     Pulse Height BMI (Body Mass Index)             68 1.715 m (5' 7.5\") 28.39 kg/m2          Blood Pressure from Last 3 Encounters:   11/09/18 98/66   10/16/18 110/64   11/21/17 102/60    Weight from Last 3 Encounters:   11/09/18 83.5 kg (184 lb)   10/16/18 87.1 kg (192 lb)   11/21/17 86.4 kg (190 lb 8 oz)              We Performed the Following     Follow-Up with Cardiologist        Primary Care Provider Office Phone # Fax #    Perez Keeley Beckett [phone] [address] [facility] [address]        Equal Access to Services     [facility] : Hadii aad ku hadasho Sojulietteali, waaxda luqadaha, qaybta kaalmada adeegyada, alexsander watson . So [facility] [phone].    ATENCIÓN: Si habla español, tiene a schwarz disposición servicios gratuitos de asistencia lingüística. Llame al [phone].    We comply with " applicable federal civil rights laws and [place] laws. We do not discriminate on the basis of race, color, national origin, age, disability, sex, sexual orientation, or gender identity.            Thank you!     Thank you for choosing [facility]  for your care. Our goal is always to provide you with excellent care. Hearing back from our patients is one way we can continue to improve our services. Please take a few minutes to complete the written survey that you may receive in the mail after your visit with us. Thank you!             Your Updated Medication List - Protect others around you: Learn how to safely use, store and throw away your medicines at www.disposemymeds.org.          This list is accurate as of 11/9/18  8:59 AM.  Always use your most recent med list.                   Brand Name Dispense Instructions for use Diagnosis    acetaminophen 650 MG 8 hour tablet      Take by mouth daily        ACETAMINOPHEN PM PO      Take 500 mg by mouth        aspirin 325 MG tablet      Take 325 mg by mouth daily.        cholecalciferol 1000 UNIT tablet    vitamin D3     Take 1 tablet by mouth daily        levothyroxine 88 MCG tablet    SYNTHROID/LEVOTHROID    60 tablet    Take 1 tablet (88 mcg) by mouth daily    Acquired hypothyroidism       MELATONIN PO      Take 10 mg by mouth At Bedtime        metoprolol succinate 25 MG 24 hr tablet    TOPROL-XL     1/2 tablet daily        MULTI-VITAMIN PO      Take by mouth daily        NITROSTAT SL      Place 0.4 mg under the tongue.        rosuvastatin 40 MG tablet    CRESTOR    90 tablet    Take 1 tablet (40 mg) by mouth daily    Hypercholesterolemia       sotalol 80 MG tablet    BETAPACE     Take 1 tablet by mouth 2 times daily.

## 2018-11-09 NOTE — TELEPHONE ENCOUNTER
Our goal is to have forms completed within 72 hours, however some forms may require a visit or additional information.    What clinic location was the form placed at [facility] or [facility]     Who is the form from? MIRNA  Where did the form come from? Faxed to clinic   The form was placed in the inbox of Perez Beckett Jr MD      Please fax to [phone]    Additional comments: MIRNA plan of care     Call take on 11/9/2018 at 10:16 AM by Kassie Crum

## 2018-11-09 NOTE — LETTER
11/9/2018      RINKU TONEY MD  [address]  [facility] [address]      RE: Jonathon Lainezjuan pablo       Dear Colleague,    [facility] the [facility].    Service Date: 11/09/2018      HISTORY OF PRESENT ILLNESS:  I had the opportunity to see Mr. Jonathon Guerra in Cardiology Clinic today at the [facility] in [place] for reevaluation of nonocclusive coronary artery disease and paroxysmal atrial fibrillation.        Mr. Guerra has had a CT coronary angiogram done back in 2006 which demonstrated moderate diffuse coronary artery disease which appears nonocclusive.  He has had regular stress testing since that time and has had no evidence of myocardial ischemia.  He is doing well in that regard currently with no symptoms suggestive of angina.  He recently had a followup stress test, during which he was able to exercise for 10 minutes on the Marcello protocol despite recently pulling his hamstring muscle.  His ECG was normal and there was no evidence of ischemia by echocardiography.      In 2009, he developed atrial fibrillation which was treated with several cardioversions, but kept recurring until he was started on sotalol.  Since that time, he has not had symptomatic recurrences of atrial fibrillation.  He has been managing that issue at [facility] and was treated there by Electrophysiology.  He has been told to consider anticoagulation for stroke prevention based on a CHADS-VASc score of at least 1, and possibly 2 if you consider his coronary disease, significant enough.  However, he has chosen to continue aspirin alone based on the fact that he has had no symptomatic recurrences of atrial fibrillation.      He continues on atorvastatin for management of his dyslipidemia with appropriately low LDL of 55, HDL is 42, triglycerides are 71.            PHYSICAL EXAMINATION:  His blood pressure is 98/66, heart rate 68 and weight 184  pounds.  His lungs are clear.  His heart rhythm is regular.  He has no cardiac murmurs.      IMPRESSIONS:  Mr. Angelia Young is a 73-year-old gentleman with history of mild to moderate nonocclusive coronary artery disease without symptoms of angina.  His stress test is again normal this year.  He has cardiac risk factors including dyslipidemia.  He does not clearly have hypertension and is not treated for that specifically.  He is on a low dose of metoprolol and sotalol for control of atrial fibrillation.  Since starting those medications a couple of years ago, he has not had any symptomatic recurrences of atrial fibrillation.        I agree that I would recommend anticoagulation for stroke prevention even though he has not had recurrences of atrial fibrillation.  We talked about that again today and he would prefer to stay on aspirin alone.  I will not make any medication changes.  It sounds like he would prefer to continue his cardiology care at the [place] next year and I will provide him with some references there.  He would prefer to continue a strategy of monitoring his coronary disease with periodic stress testing.      cc:      Perez Beckett MD   [facility]   [address] [place] [address]         GEORGE GUERRERO MD, [facility]             D: 2018   T: 2018   MT: RUMA      Name:     ANGELIA YOUNG   MRN:      [id number]        Account:      [id number]   :      1945           Service Date: 2018      Document: [id number]         Outpatient Encounter Prescriptions as of 2018   Medication Sig Dispense Refill     Acetaminophen 650 MG TABS Take by mouth daily       aspirin 325 MG tablet Take 325 mg by mouth daily.       cholecalciferol (VITAMIN D3) 1000 UNIT tablet Take 1 tablet by mouth daily        levothyroxine (SYNTHROID/LEVOTHROID) 88 MCG tablet Take 1 tablet (88 mcg) by mouth daily 60 tablet 0     MELATONIN PO Take 10 mg by mouth At Bedtime         metoprolol (TOPROL-XL) 25 MG 24 hr tablet 1/2 tablet daily       Multiple Vitamin (MULTI-VITAMIN PO) Take by mouth daily        rosuvastatin (CRESTOR) 40 MG tablet Take 1 tablet (40 mg) by mouth daily 90 tablet 0     sotalol (BETAPACE) 80 MG tablet Take 1 tablet by mouth 2 times daily.       Diphenhydramine-APAP, sleep, (ACETAMINOPHEN PM PO) Take 500 mg by mouth       Nitroglycerin (NITROSTAT SL) Place 0.4 mg under the tongue.       [DISCONTINUED] ciclopirox (LOPROX) 0.77 % cream as needed        [DISCONTINUED] ketoconazole (NIZORAL) 2 % cream APPLY TOPICALLY 2 TIMES DAILY (Patient not taking: Reported on 11/21/2017) 15 g 0     [DISCONTINUED] triamcinolone (KENALOG) 0.5 % cream Apply sparingly to affected area three times daily. (Patient not taking: Reported on 11/21/2017) 30 g 5     No facility-administered encounter medications on file as of 11/9/2018.        Again, thank you for allowing me to participate in the care of your patient.      Sincerely,    GEORGE GUERRERO MD     [facility]

## 2018-11-10 NOTE — PROGRESS NOTES
Service Date: 11/09/2018      HISTORY OF PRESENT ILLNESS:  I had the opportunity to see Mr. Jonathon Guerra in Cardiology Clinic today at the [facility] in [place] for reevaluation of nonocclusive coronary artery disease and paroxysmal atrial fibrillation.        Mr. Guerra has had a CT coronary angiogram done back in 2006 which demonstrated moderate diffuse coronary artery disease which appears nonocclusive.  He has had regular stress testing since that time and has had no evidence of myocardial ischemia.  He is doing well in that regard currently with no symptoms suggestive of angina.  He recently had a followup stress test, during which he was able to exercise for 10 minutes on the Marcello protocol despite recently pulling his hamstring muscle.  His ECG was normal and there was no evidence of ischemia by echocardiography.      In 2009, he developed atrial fibrillation which was treated with several cardioversions, but kept recurring until he was started on sotalol.  Since that time, he has not had symptomatic recurrences of atrial fibrillation.  He has been managing that issue at [facility] and was treated there by Electrophysiology.  He has been told to consider anticoagulation for stroke prevention based on a CHADS-VASc score of at least 1, and possibly 2 if you consider his coronary disease, significant enough.  However, he has chosen to continue aspirin alone based on the fact that he has had no symptomatic recurrences of atrial fibrillation.      He continues on atorvastatin for management of his dyslipidemia with appropriately low LDL of 55, HDL is 42, triglycerides are 71.        PHYSICAL EXAMINATION:  His blood pressure is 98/66, heart rate 68 and weight 184 pounds.  His lungs are clear.  His heart rhythm is regular.  He has no cardiac murmurs.      IMPRESSIONS:  Mr. Jonathon Guerra is a 73-year-old gentleman with history of mild to moderate nonocclusive coronary artery disease without  symptoms of angina.  His stress test is again normal this year.  He has cardiac risk factors including dyslipidemia.  He does not clearly have hypertension and is not treated for that specifically.  He is on a low dose of metoprolol and sotalol for control of atrial fibrillation.  Since starting those medications a couple of years ago, he has not had any symptomatic recurrences of atrial fibrillation.        I agree that I would recommend anticoagulation for stroke prevention even though he has not had recurrences of atrial fibrillation.  We talked about that again today and he would prefer to stay on aspirin alone.  I will not make any medication changes.  It sounds like he would prefer to continue his cardiology care at the [place] next year and I will provide him with some references there.  He would prefer to continue a strategy of monitoring his coronary disease with periodic stress testing.      cc:      Perez Beckett MD   [facility]   [address] [place] [address]         GEORGE GUERRERO MD, [facility]             D: 2018   T: 2018   MT: RUMA      Name:     ANGELIA YOUNG   MRN:      [id number]        Account:      [id number]   :      1945           Service Date: 2018      Document: [id number]

## 2018-11-16 ENCOUNTER — THERAPY VISIT (OUTPATIENT)
Dept: PHYSICAL THERAPY | Facility: CLINIC | Age: 73
End: 2018-11-16
Payer: COMMERCIAL

## 2018-11-16 DIAGNOSIS — S76.319A HAMSTRING MUSCLE STRAIN: ICD-10-CM

## 2018-11-16 PROCEDURE — 97110 THERAPEUTIC EXERCISES: CPT | Mod: GP | Performed by: PHYSICAL THERAPIST

## 2018-11-16 PROCEDURE — 97140 MANUAL THERAPY 1/> REGIONS: CPT | Mod: GP | Performed by: PHYSICAL THERAPIST

## 2018-11-16 NOTE — MR AVS SNAPSHOT
After Visit Summary   11/16/2018    Jonathon Guerra    MRN: [id number]           Patient Information     Date Of Birth          1945        Visit Information        Provider Department      11/16/2018 1:30 PM Mega Galeano PT [facility]        Today's Diagnoses     Hamstring muscle strain           Follow-ups after your visit        Your next 10 appointments already scheduled     Nov 29, 2018  2:45 PM CST   PHYSICAL with Perez Beckett MD   [facility]    [address]  [address]  [facility] [id number] [phone]            Nov 30, 2018  7:40 AM CST   MIRNA Running with Mega Galeano PT   [facility] (MIRNA [address] [address] [phone]              Who to contact     If you have questions or need follow up information about today's clinic visit or your schedule please contact [facility] directly at [phone].  Normal or non-critical lab and imaging results will be communicated to you by ImmuMetrixhart, letter or phone within 4 business days after the clinic has received the results. If you do not hear from us within 7 days, please contact the clinic through [employer] or phone. If you have a critical or abnormal lab result, we will notify you by phone as soon as possible.  Submit refill requests through [employer] or call your pharmacy and they will forward the refill request to us. Please allow 3 business days for your refill to be completed.          Additional Information About Your Visit        Im[employer]Metrixhart Information     [employer] gives you secure access to your electronic health record. If you see a primary care provider, you can also send messages to your care team and make appointments. If you have questions, please call your primary care clinic.   If you do not have a primary care provider, please call [phone] and they will assist you.        Care EveryWhere ID     This is your Care EveryWhere ID. This could be used by other organizations to access your [facility] medical records  [id number]         Blood Pressure from Last 3 Encounters:   11/09/18 98/66   10/16/18 110/64   11/21/17 102/60    Weight from Last 3 Encounters:   11/09/18 83.5 kg (184 lb)   10/16/18 87.1 kg (192 lb)   11/21/17 86.4 kg (190 lb 8 oz)              We Performed the Following     Manual Ther Tech, 1+Regions, EA 15 min     Therapeutic Exercises        Primary Care Provider Office Phone # Fax #    Perez Keeley Beckett [phone] [address] [facility] [address]        Equal Access to Services     [facility]: Hadii aad ku hadasho Soomaali, waaxda luqadaha, qaybta kaalmada adeegyada, waxay idiin hayaan adezenon watson . So [facility] [phone].    ATENCIÓN: Si habla español, tiene a schwarz disposición servicios gratuitos de asistencia lingüística. Llame al [phone].    We comply with applicable federal civil rights laws and [place] laws. We do not discriminate on the basis of race, color, national origin, age, disability, sex, sexual orientation, or gender identity.            Thank you!     Thank you for choosing [facility]  for your care. Our goal is always to provide you with excellent care. Hearing back from our patients is one way we can continue to improve our services. Please take a few minutes to complete the written survey that you may receive in the mail after your visit with us. Thank you!             Your Updated Medication List - Protect others around you: Learn how to safely use, store and throw away your medicines at www.disposemymeds.org.          This list is accurate as of 11/16/18  1:59 PM.  Always use your most recent med list.                   Brand Name Dispense Instructions for use  Diagnosis    acetaminophen 650 MG 8 hour tablet      Take by mouth daily        ACETAMINOPHEN PM PO      Take 500 mg by mouth        aspirin 325 MG tablet      Take 325 mg by mouth daily.        cholecalciferol 1000 UNIT tablet    vitamin D3     Take 1 tablet by mouth daily        levothyroxine 88 MCG tablet    SYNTHROID/LEVOTHROID    60 tablet    Take 1 tablet (88 mcg) by mouth daily    Acquired hypothyroidism       MELATONIN PO      Take 10 mg by mouth At Bedtime        metoprolol succinate 25 MG 24 hr tablet    TOPROL-XL     1/2 tablet daily        MULTI-VITAMIN PO      Take by mouth daily        NITROSTAT SL      Place 0.4 mg under the tongue.        rosuvastatin 40 MG tablet    CRESTOR    90 tablet    Take 1 tablet (40 mg) by mouth daily    Hypercholesterolemia       sotalol 80 MG tablet    BETAPACE     Take 1 tablet by mouth 2 times daily.

## 2018-11-29 ENCOUNTER — OFFICE VISIT (OUTPATIENT)
Dept: FAMILY MEDICINE | Facility: CLINIC | Age: 73
End: 2018-11-29
Payer: COMMERCIAL

## 2018-11-29 VITALS
WEIGHT: 183 LBS | HEART RATE: 63 BPM | DIASTOLIC BLOOD PRESSURE: 60 MMHG | RESPIRATION RATE: 16 BRPM | TEMPERATURE: 97 F | HEIGHT: 68 IN | SYSTOLIC BLOOD PRESSURE: 104 MMHG | OXYGEN SATURATION: 98 % | BODY MASS INDEX: 27.74 KG/M2

## 2018-11-29 DIAGNOSIS — M54.32 SCIATICA WITHOUT BACK PAIN, LEFT: ICD-10-CM

## 2018-11-29 DIAGNOSIS — E78.5 HYPERLIPIDEMIA, UNSPECIFIED HYPERLIPIDEMIA TYPE: ICD-10-CM

## 2018-11-29 DIAGNOSIS — E03.9 ACQUIRED HYPOTHYROIDISM: ICD-10-CM

## 2018-11-29 DIAGNOSIS — G47.33 OSA (OBSTRUCTIVE SLEEP APNEA): Chronic | ICD-10-CM

## 2018-11-29 DIAGNOSIS — E78.00 PURE HYPERCHOLESTEROLEMIA: ICD-10-CM

## 2018-11-29 DIAGNOSIS — I48.20 CHRONIC ATRIAL FIBRILLATION (H): ICD-10-CM

## 2018-11-29 DIAGNOSIS — Z00.00 ROUTINE HISTORY AND PHYSICAL EXAMINATION OF ADULT: Primary | ICD-10-CM

## 2018-11-29 DIAGNOSIS — S76.319A HAMSTRING MUSCLE STRAIN, UNSPECIFIED LATERALITY, INITIAL ENCOUNTER: ICD-10-CM

## 2018-11-29 DIAGNOSIS — K21.9 GASTROESOPHAGEAL REFLUX DISEASE WITHOUT ESOPHAGITIS: ICD-10-CM

## 2018-11-29 DIAGNOSIS — H81.11 BENIGN PAROXYSMAL POSITIONAL VERTIGO, RIGHT: ICD-10-CM

## 2018-11-29 DIAGNOSIS — I25.10 CORONARY ARTERY DISEASE INVOLVING NATIVE CORONARY ARTERY OF NATIVE HEART WITHOUT ANGINA PECTORIS: ICD-10-CM

## 2018-11-29 PROCEDURE — 84443 ASSAY THYROID STIM HORMONE: CPT | Performed by: FAMILY MEDICINE

## 2018-11-29 PROCEDURE — 36415 COLL VENOUS BLD VENIPUNCTURE: CPT | Performed by: FAMILY MEDICINE

## 2018-11-29 PROCEDURE — 99397 PER PM REEVAL EST PAT 65+ YR: CPT | Performed by: FAMILY MEDICINE

## 2018-11-29 ASSESSMENT — ACTIVITIES OF DAILY LIVING (ADL): CURRENT_FUNCTION: NO ASSISTANCE NEEDED

## 2018-11-29 NOTE — MR AVS SNAPSHOT
After Visit Summary   11/29/2018    Jonathon Guerra    MRN: [id number]           Patient Information     Date Of Birth          1945        Visit Information        Provider Department      11/29/2018 2:45 PM Perez Beckett MD [facility]        Today's Diagnoses     Routine history and physical examination of adult    -  1    Pure hypercholesterolemia        SUKH (obstructive sleep apnea)        Hamstring muscle strain, unspecified laterality, initial encounter        Sciatica without back pain, left        Benign paroxysmal positional vertigo, right        Coronary artery disease involving native coronary artery of native heart without angina pectoris        Acquired hypothyroidism        Chronic atrial fibrillation (H)        Gastroesophageal reflux disease without esophagitis        Hyperlipidemia, unspecified hyperlipidemia type          Care Instructions      (Z00.00) Routine history and physical examination of adult  (primary encounter diagnosis)    Comment:      Plan:          (E78.00) Pure hypercholesterolemia    Comment:      Plan: TSH                   (G47.33) SUKH (obstructive sleep apnea)    Comment:      Plan:          (S76.319A) Hamstring muscle strain, unspecified laterality, initial encounter    Comment:      Plan:          (M54.32) Sciatica without back pain, left    Comment:      Plan:          (H81.11) Benign paroxysmal positional vertigo, right    Comment:      Plan:          (I25.10) Coronary artery disease involving native coronary artery of native heart without angina pectoris    Comment:      Plan:          (E03.9) Acquired hypothyroidism    Comment:      Plan: TSH                   (I48.2) Chronic atrial fibrillation (H)    Comment:      Plan:          (K21.9) Gastroesophageal reflux disease without esophagitis    Comment:      Plan:          (E78.5) Hyperlipidemia, unspecified hyperlipidemia type    Comment:      Plan:           "          Follow-ups after your visit        Follow-up notes from your care team     Return in about 1 year (around 11/29/2019).      Who to contact     If you have questions or need follow up information about today's clinic visit or your schedule please contact [facility] directly at [phone].  Normal or non-critical lab and imaging results will be communicated to you by MyChart, letter or phone within 4 business days after the clinic has received the results. If you do not hear from us within 7 days, please contact the clinic through [employer] or phone. If you have a critical or abnormal lab result, we will notify you by phone as soon as possible.  Submit refill requests through [employer] or call your pharmacy and they will forward the refill request to us. Please allow 3 business days for your refill to be completed.          Additional Information About Your Visit        MyChart Information     [employer] gives you secure access to your electronic health record. If you see a primary care provider, you can also send messages to your care team and make appointments. If you have questions, please call your primary care clinic.  If you do not have a primary care provider, please call [phone] and they will assist you.        Care EveryWhere ID     This is your Care EveryWhere ID. This could be used by other organizations to access your [place] medical records  [id number]        Your Vitals Were     Pulse Temperature Respirations Height Pulse Oximetry BMI (Body Mass Index)    63 97  F (36.1  C) (Tympanic) 16 5' 7.5\" (1.715 m) 98% 28.24 kg/m2       Blood Pressure from Last 3 Encounters:   11/29/18 104/60   11/09/18 98/66   10/16/18 110/64    Weight from Last 3 Encounters:   11/29/18 183 lb (83 kg)   11/09/18 184 lb (83.5 kg)   10/16/18 192 lb (87.1 kg)              We Performed the Following     TSH        Primary Care Provider Office Phone # Fax #    Perez Beckett MD " [phone]       [address]  [facility] [address]        Equal Access to Services     JESSICA GARCIA : Hadii fracisco garnica jonny Cardozo, wacitlalida luqadaha, qaybta kaalmada sally, alexsander downs. So [facility] [phone].    ATENCIÓN: Si habla español, tiene a schwarz disposición servicios gratuitos de asistencia lingüística. Llame al [phone].    We comply with applicable federal civil rights laws and [place] laws. We do not discriminate on the basis of race, color, national origin, age, disability, sex, sexual orientation, or gender identity.            Thank you!     Thank you for choosing [facility]  for your care. Our goal is always to provide you with excellent care. Hearing back from our patients is one way we can continue to improve our services. Please take a few minutes to complete the written survey that you may receive in the mail after your visit with us. Thank you!             Your Updated Medication List - Protect others around you: Learn how to safely use, store and throw away your medicines at www.disposemymeds.org.          This list is accurate as of 11/29/18  3:24 PM.  Always use your most recent med list.                   Brand Name Dispense Instructions for use Diagnosis    acetaminophen 650 MG 8 hour tablet      Take by mouth daily        ACETAMINOPHEN PM PO      Take 500 mg by mouth        aspirin 325 MG tablet    ASA     Take 325 mg by mouth daily.        levothyroxine 88 MCG tablet    SYNTHROID/LEVOTHROID    60 tablet    Take 1 tablet (88 mcg) by mouth daily    Acquired hypothyroidism       MELATONIN PO      Take 10 mg by mouth At Bedtime        metoprolol succinate ER 25 MG 24 hr tablet    TOPROL-XL     1/2 tablet daily        MULTI-VITAMIN PO      Take by mouth daily        NITROSTAT SL      Place 0.4 mg under the tongue.        rosuvastatin 40 MG tablet    CRESTOR    90 tablet    Take 1 tablet (40 mg) by mouth  daily    Hypercholesterolemia       sotalol 80 MG tablet    BETAPACE     Take 1 tablet by mouth 2 times daily.        vitamin D3 1000 units (25 mcg) tablet    CHOLECALCIFEROL     Take 1 tablet by mouth daily

## 2018-11-29 NOTE — PROGRESS NOTES
"SUBJECTIVE:   Jonathon Guerra is a 73 year old male who presents for Preventive Visit.  Are you in the first 12 months of your Medicare coverage?  No    Annual Wellness Visit     In general, how would you rate your overall health?  Good    Frequency of exercise:  2-3 days/week    Duration of exercise:  30-45 minutes    Do you usually eat at least 4 servings of fruit and vegetables a day, include whole grains    & fiber and avoid regularly eating high fat or \"junk\" foods?  Yes    Taking medications regularly:  Yes    Medication side effects:  None    Ability to successfully perform activities of daily living:  No assistance needed    Home Safety:  Lack of grab bars in the bathroom    Hearing Impairment:  No hearing concerns    In the past 6 months, have you been bothered by leaking of urine? Yes    In general, how would you rate your overall mental or emotional health?  Good    PHQ-2 Total Score: 0    Additional concerns today:  No    Do you feel safe in your environment? Yes    Do you have a Health Care Directive? Yes: Advance Directive has been received and scanned.      Fall risk  Fallen 2 or more times in the past year?: No  Any fall with injury in the past year?: No    Cognitive Screening   1) Repeat 3 items (Leader, Season, Table)    2) Clock draw: NORMAL  3) 3 item recall: Recalls 3 objects  Results: 3 items recalled: COGNITIVE IMPAIRMENT LESS LIKELY    Mini-CogTM Copyright NEISHA Urbina. Licensed by the author for use in [facility]; reprinted with permission ([email or web address]). All rights reserved.      Do you have sleep apnea, excessive snoring or daytime drowsiness?: no    Reviewed and updated as needed this visit by clinical staff         Reviewed and updated as needed this visit by Provider        Social History   Substance Use Topics     Smoking status: Former Smoker     Start date: 1/30/1966     Quit date: 11/30/1992     Smokeless tobacco: Never Used     Alcohol use No       Alcohol Use 11/29/2018 "   If you drink alcohol do you typically have greater than 3 drinks per day OR greater than 7 drinks per week? Not Applicable       PROSTATE SPECIFIC ANTIGEN BACK TO NORMAL     THYROID PROPER CONTROL    NO ATRIAL FIBRILLATION  X 7 YEARS     TREATMENT PROGNOSIS BENEFITS AND RISKS DISCUSSED COUMADIN OR FACTOR X DISCUSSED     ASPIRIN DISCUSSED     SEXUAL DYSFUNCTION NOT AN ISSUE    URTICARIA QUIESCENT    CORONARY ARTERY DISEASE ASYMPTOMATIC     BENIGN POSITIONAL VERTIGO ON RIGHT IN PAST EPLEY MANEUVER HELPED    PARTIAL HAMSTRING TEAR HEALING SCENARIO        Current providers sharing in care for this patient include:   Patient Care Team:  Perez Beckett MD as PCP - General (Family Practice)    The following health maintenance items are reviewed in Epic and correct as of today:  Health Maintenance   Topic Date Due     AORTIC ANEURYSM SCREENING (SYSTEM ASSIGNED)  08/09/2012     FALL RISK ASSESSMENT  09/20/2017     PHQ-2 Q1 YR  11/21/2018     ADVANCE DIRECTIVE PLANNING Q5 YRS  11/12/2019     LIPID SCREEN Q5 YR MALE (SYSTEM ASSIGNED)  10/30/2023     TETANUS IMMUNIZATION (SYSTEM ASSIGNED)  10/28/2024     COLON CANCER SCREEN (SYSTEM ASSIGNED)  04/22/2025     PNEUMOCOCCAL  Completed     INFLUENZA VACCINE  Completed     HEPATITIS C SCREENING  Completed       Labs reviewed in EPIC  BP Readings from Last 3 Encounters:   11/29/18 104/60   11/09/18 98/66   10/16/18 110/64    Wt Readings from Last 3 Encounters:   11/29/18 183 lb (83 kg)   11/09/18 184 lb (83.5 kg)   10/16/18 192 lb (87.1 kg)                  Patient Active Problem List   Diagnosis     Elevated prostate specific antigen (PSA)     Hypothyroidism     Atrial fibrillation (H)     Insomnia     Gastroesophageal reflux disease without esophagitis     Hyperlipidemia     Impotence of organic origin     Urticaria     Arthropathy, lower leg     Advance Care Planning     Hyperlipidemia     Coronary artery disease     Tinea corporis     Pure hypercholesterolemia     SUKH  (obstructive sleep apnea)     Benign paroxysmal positional vertigo, right     Sciatica without back pain, left     Hamstring muscle strain     Past Surgical History:   Procedure Laterality Date     HERNIA REPAIR, INGUINAL RT/LT       OTHER SURGICAL HISTORY      upper and lower partial plate       Social History   Substance Use Topics     Smoking status: Former Smoker     Start date: 1/30/1966     Quit date: 11/30/1992     Smokeless tobacco: Never Used     Alcohol use No     Family History   Problem Relation Age of Onset     Cancer Father      prostate     Cancer Brother      Other - See Comments Mother      old age         Current Outpatient Prescriptions   Medication Sig Dispense Refill     Acetaminophen 650 MG TABS Take by mouth daily       aspirin 325 MG tablet Take 325 mg by mouth daily.       cholecalciferol (VITAMIN D3) 1000 UNIT tablet Take 1 tablet by mouth daily        Diphenhydramine-APAP, sleep, (ACETAMINOPHEN PM PO) Take 500 mg by mouth       levothyroxine (SYNTHROID/LEVOTHROID) 88 MCG tablet Take 1 tablet (88 mcg) by mouth daily 60 tablet 0     MELATONIN PO Take 10 mg by mouth At Bedtime        metoprolol (TOPROL-XL) 25 MG 24 hr tablet 1/2 tablet daily       Multiple Vitamin (MULTI-VITAMIN PO) Take by mouth daily        rosuvastatin (CRESTOR) 40 MG tablet Take 1 tablet (40 mg) by mouth daily 90 tablet 0     sotalol (BETAPACE) 80 MG tablet Take 1 tablet by mouth 2 times daily.       Nitroglycerin (NITROSTAT SL) Place 0.4 mg under the tongue.       Allergies   Allergen Reactions     Atorvastatin      Lipitor--muscle aches     Recent Labs   Lab Test  10/30/18   0818  10/16/18   1455  01/15/18   0756  08/17/17   0744  09/20/16   0938   LDL  55   --   50  85  83   HDL  42   --   60  50  68   TRIG  71   --   97  80  53   ALT   --    --   27  <5*  30   CR   --   0.71   --    --   0.80   GFRESTIMATED   --   >90   --    --   >90   GFRESTBLACK   --   >90   --    --   >90   POTASSIUM   --   4.0   --    --   4.4  "  TSH   --   0.25*  2.43   --   1.07   OVER REPLACED AND MEDICINE ADJUSTED     Review of Systems has Elevated prostate specific antigen (PSA); Hypothyroidism; Atrial fibrillation (H); Insomnia; Gastroesophageal reflux disease without esophagitis; Hyperlipidemia; Impotence of organic origin; Urticaria; Arthropathy, lower leg; Advance Care Planning; Hyperlipidemia; Coronary artery disease; Tinea corporis; Pure hypercholesterolemia; SUKH (obstructive sleep apnea); Benign paroxysmal positional vertigo, right; Sciatica without back pain, left; and Hamstring muscle strain on his problem list.    Constitutional, HEENT, cardiovascular, pulmonary, GI, , musculoskeletal, neuro, skin, endocrine and psych systems are negative, except as otherwise noted.    OBJECTIVE:   There were no vitals taken for this visit. Estimated body mass index is 28.39 kg/(m^2) as calculated from the following:    Height as of 11/9/18: 5' 7.5\" (1.715 m).    Weight as of 11/9/18: 184 lb (83.5 kg).  Physical Exam  GENERAL: healthy, alert and no distress  EYES: Eyes grossly normal to inspection, PERRL and conjunctivae and sclerae normal  HENT: ear canals and TM's normal, nose and mouth without ulcers or lesions  NECK: no adenopathy, no asymmetry, masses, or scars and thyroid normal to palpation  RESP: lungs clear to auscultation - no rales, rhonchi or wheezes  CV: regular rate and rhythm, normal S1 S2, no S3 or S4, no murmur, click or rub, no peripheral edema and peripheral pulses strong  ABDOMEN: soft, nontender, no hepatosplenomegaly, no masses and bowel sounds normal   (male): normal male genitalia without lesions or urethral discharge, no hernia  RECTAL: normal sphincter tone, no rectal masses, prostate normal size, smooth, nontender without nodules or masses  MS: no gross musculoskeletal defects noted, no edema  SKIN: no suspicious lesions or rashes  NEURO: Normal strength and tone, mentation intact and speech normal  PSYCH: mentation appears " "normal, affect normal/bright  LYMPH: no cervical, supraclavicular, axillary, or inguinal adenopathy  Diabetic foot exam: normal DP and PT pulses, no trophic changes or ulcerative lesions, normal sensory exam and normal monofilament exam  RIGHT HERNIA SCARS BUT STABLE NO WEAKNESS YET  HAD TO HEAL BY SECONDARY INTENTION  LEFT STABLE HERNIA SCAR     Diagnostic Test Results:  Results for orders placed or performed in visit on 10/30/18   Lipid panel reflex to direct LDL Fasting   Result Value Ref Range    Cholesterol 111 <200 mg/dL    Triglycerides 71 <150 mg/dL    HDL Cholesterol 42 >39 mg/dL    LDL Cholesterol Calculated 55 <100 mg/dL    Non HDL Cholesterol 69 <130 mg/dL       ASSESSMENT / PLAN:       ICD-10-CM    1. Routine history and physical examination of adult Z00.00    2. Pure hypercholesterolemia E78.00 TSH   3. SUKH (obstructive sleep apnea) G47.33    4. Hamstring muscle strain, unspecified laterality, initial encounter S76.319A    5. Sciatica without back pain, left M54.32    6. Benign paroxysmal positional vertigo, right H81.11    7. Coronary artery disease involving native coronary artery of native heart without angina pectoris I25.10    8. Acquired hypothyroidism E03.9 TSH   9. Chronic atrial fibrillation (H) I48.2    10. Gastroesophageal reflux disease without esophagitis K21.9    11. Hyperlipidemia, unspecified hyperlipidemia type E78.5        End of Life Planning:  Patient currently has an advanced directive:  WILL DO     COUNSELING:  Reviewed preventive health counseling, as reflected in patient instructions    BP Readings from Last 1 Encounters:   11/09/18 98/66     Estimated body mass index is 28.39 kg/(m^2) as calculated from the following:    Height as of 11/9/18: 5' 7.5\" (1.715 m).    Weight as of 11/9/18: 184 lb (83.5 kg).           reports that he quit smoking about 26 years ago. He started smoking about 52 years ago. He has never used smokeless tobacco.      Appropriate preventive services were " discussed with this patient, including applicable screening as appropriate for cardiovascular disease, diabetes, osteopenia/osteoporosis, and glaucoma.  As appropriate for age/gender, discussed screening for colorectal cancer, prostate cancer, breast cancer, and cervical cancer. Checklist reviewing preventive services available has been given to the patient.    Reviewed patients plan of care and provided an AVS. The Basic Care Plan (routine screening as documented in Health Maintenance) for Jonathon meets the Care Plan requirement. This Care Plan has been established and reviewed with the Patient.    Counseling Resources:  ATP IV Guidelines  Pooled Cohorts Equation Calculator  Breast Cancer Risk Calculator  FRAX Risk Assessment  ICSI Preventive Guidelines  Dietary Guidelines for Americans, 2010  USDA's MyPlate  ASA Prophylaxis  Lung CA Screening    RINKU TONEY MD  [facility]

## 2018-11-29 NOTE — PATIENT INSTRUCTIONS
(Z00.00) Routine history and physical examination of adult  (primary encounter diagnosis)    Comment:      Plan:          (E78.00) Pure hypercholesterolemia    Comment:      Plan: TSH                   (G47.33) SUKH (obstructive sleep apnea)    Comment:      Plan:          (S76.319A) Hamstring muscle strain, unspecified laterality, initial encounter    Comment:      Plan:          (M54.32) Sciatica without back pain, left    Comment:      Plan:          (H81.11) Benign paroxysmal positional vertigo, right    Comment:      Plan:          (I25.10) Coronary artery disease involving native coronary artery of native heart without angina pectoris    Comment:      Plan:          (E03.9) Acquired hypothyroidism    Comment:      Plan: TSH                   (I48.2) Chronic atrial fibrillation (H)    Comment:      Plan:          (K21.9) Gastroesophageal reflux disease without esophagitis    Comment:      Plan:          (E78.5) Hyperlipidemia, unspecified hyperlipidemia type    Comment:      Plan:

## 2018-11-30 LAB — TSH SERPL DL<=0.005 MIU/L-ACNC: 0.85 MU/L (ref 0.4–4)

## 2018-12-14 DIAGNOSIS — E78.00 HYPERCHOLESTEROLEMIA: ICD-10-CM

## 2018-12-14 DIAGNOSIS — E03.9 ACQUIRED HYPOTHYROIDISM: ICD-10-CM

## 2018-12-14 RX ORDER — ROSUVASTATIN CALCIUM 40 MG/1
40 TABLET, COATED ORAL DAILY
Qty: 90 TABLET | Refills: 3 | Status: SHIPPED | OUTPATIENT
Start: 2018-12-14 | End: 2019-06-21

## 2018-12-17 RX ORDER — LEVOTHYROXINE SODIUM 88 UG/1
88 TABLET ORAL DAILY
Qty: 90 TABLET | Refills: 2 | Status: SHIPPED | OUTPATIENT
Start: 2018-12-17 | End: 2019-06-21

## 2018-12-17 NOTE — TELEPHONE ENCOUNTER
"levothyroxine (SYNTHROID/LEVOTHROID) 88 MCG tablet  Last Written Prescription Date:  10/17/18  Last Fill Quantity: 60,  # refills: 0   Last office visit: 11/29/2018 with prescribing provider: Dr. Perez Beckett   Future Office Visit:      Requested Prescriptions   Pending Prescriptions Disp Refills     levothyroxine (SYNTHROID/LEVOTHROID) 88 MCG tablet 90 tablet 0     Sig: Take 1 tablet (88 mcg) by mouth daily    Thyroid Protocol Passed - 12/17/2018  4:14 PM       Passed - Patient is 12 years or older       Passed - Recent (12 mo) or future (30 days) visit within the authorizing provider's specialty    Patient had office visit in the last 12 months or has a visit in the next 30 days with authorizing provider or within the authorizing provider's specialty.  See \"Patient Info\" tab in inbasket, or \"Choose Columns\" in Meds & Orders section of the refill encounter.             Passed - Normal TSH on file in past 12 months    Recent Labs   Lab Test 11/29/18  1525   TSH 0.85              Prescription approved per [facility] Refill Protocol.      "

## 2018-12-18 DIAGNOSIS — I25.10 CORONARY ARTERY DISEASE INVOLVING NATIVE CORONARY ARTERY OF NATIVE HEART WITHOUT ANGINA PECTORIS: Primary | ICD-10-CM

## 2018-12-18 NOTE — TELEPHONE ENCOUNTER
"Requested Prescriptions   Pending Prescriptions Disp Refills     sotalol (BETAPACE) 80 MG tablet [Pharmacy Med Name: SOTALOL 80 MG TABLET 80 TAB]  Last Written Prescription Date:  na  Last Fill Quantity: na,  # refills: na   Last office visit: 11/29/2018 with prescribing provider:  MARYAM Beckett   Future Office Visit:     180 tablet 6     Sig: TAKE 1 TABLET BY MOUTH 2 TIMES A DAY.    Beta-Blockers Protocol Passed - 12/18/2018 12:20 PM       Passed - Blood pressure under 140/90 in past 12 months    BP Readings from Last 3 Encounters:   11/29/18 104/60   11/09/18 98/66   10/16/18 110/64                Passed - Patient is age 6 or older       Passed - Recent (12 mo) or future (30 days) visit within the authorizing provider's specialty    Patient had office visit in the last 12 months or has a visit in the next 30 days with authorizing provider or within the authorizing provider's specialty.  See \"Patient Info\" tab in inbasket, or \"Choose Columns\" in Meds & Orders section of the refill encounter.                 "

## 2018-12-19 ENCOUNTER — MEDICAL CORRESPONDENCE (OUTPATIENT)
Dept: HEALTH INFORMATION MANAGEMENT | Facility: CLINIC | Age: 73
End: 2018-12-19

## 2018-12-20 RX ORDER — SOTALOL HYDROCHLORIDE 80 MG/1
TABLET ORAL
Qty: 180 TABLET | Refills: 3 | Status: SHIPPED | OUTPATIENT
Start: 2018-12-20 | End: 2019-07-22

## 2018-12-21 ENCOUNTER — TELEPHONE (OUTPATIENT)
Dept: FAMILY MEDICINE | Facility: CLINIC | Age: 73
End: 2018-12-21

## 2018-12-21 ENCOUNTER — OFFICE VISIT (OUTPATIENT)
Dept: NURSING | Facility: CLINIC | Age: 73
End: 2018-12-21
Attending: FAMILY MEDICINE
Payer: COMMERCIAL

## 2018-12-21 DIAGNOSIS — I48.20 CHRONIC ATRIAL FIBRILLATION (H): Primary | ICD-10-CM

## 2018-12-21 PROCEDURE — 99207 ZZC NO CHARGE NURSE ONLY: CPT

## 2018-12-21 PROCEDURE — 93000 ELECTROCARDIOGRAM COMPLETE: CPT | Performed by: PHYSICIAN ASSISTANT

## 2018-12-21 NOTE — PROGRESS NOTES
Form from [facility] was faxed to clinic on 12/19.  Form states patient needed an ECG to get a med refill from [facility].  Patient was called, an ECG was ordered, form and results have been faxed back to Courtney March at [phone]

## 2019-01-16 ENCOUNTER — TRANSFERRED RECORDS (OUTPATIENT)
Dept: HEALTH INFORMATION MANAGEMENT | Facility: CLINIC | Age: 74
End: 2019-01-16

## 2019-01-17 ENCOUNTER — TRANSFERRED RECORDS (OUTPATIENT)
Dept: HEALTH INFORMATION MANAGEMENT | Facility: CLINIC | Age: 74
End: 2019-01-17

## 2019-04-29 ENCOUNTER — TRANSFERRED RECORDS (OUTPATIENT)
Dept: HEALTH INFORMATION MANAGEMENT | Facility: CLINIC | Age: 74
End: 2019-04-29

## 2019-05-08 ENCOUNTER — TRANSFERRED RECORDS (OUTPATIENT)
Dept: HEALTH INFORMATION MANAGEMENT | Facility: CLINIC | Age: 74
End: 2019-05-08

## 2019-05-09 NOTE — PATIENT INSTRUCTIONS
ICD-10-CM    1. Hepatitis C virus infection without hepatic coma, unspecified chronicity B19.20 Hepatitis C RNA, quantitative   2. Acquired hypothyroidism E03.9 TSH     PATIENT TO COME IN TO HAVE BLOOD DRAWN   NOTE IS INCOMPLETE Reactive airway disease in setting of hMPV infection. Would monitor off antibiotics. If no improvement in respiratory symptoms may need to consider increasing steroid dose to Solumedrol 20mg Q6-8hrs. Will make Duonebs Q6 ATC for now.     Dr. Montgomery to follow

## 2019-06-20 ENCOUNTER — TELEPHONE (OUTPATIENT)
Dept: FAMILY MEDICINE | Facility: CLINIC | Age: 74
End: 2019-06-20

## 2019-06-20 DIAGNOSIS — E03.9 ACQUIRED HYPOTHYROIDISM: ICD-10-CM

## 2019-06-20 DIAGNOSIS — E78.00 HYPERCHOLESTEROLEMIA: ICD-10-CM

## 2019-06-20 DIAGNOSIS — I48.20 CHRONIC ATRIAL FIBRILLATION (H): Primary | ICD-10-CM

## 2019-06-20 NOTE — TELEPHONE ENCOUNTER
"Requested Prescriptions   Pending Prescriptions Disp Refills     metoprolol succinate ER (TOPROL-XL) 25 MG 24 hr tablet  Last Written Prescription Date:  n/a  Last Fill Quantity: n/a,  # refills: n/a   Last office visit: 2018 with prescribing provider:  ok   Future Office Visit:      This med was reported by the patient         Si/2 tablet daily       Beta-Blockers Protocol Passed - 2019 10:26 AM        Passed - Blood pressure under 140/90 in past 12 months     BP Readings from Last 3 Encounters:   18 104/60   18 98/66   10/16/18 110/64                 Passed - Patient is age 6 or older        Passed - Recent (12 mo) or future (30 days) visit within the authorizing provider's specialty     Patient had office visit in the last 12 months or has a visit in the next 30 days with authorizing provider or within the authorizing provider's specialty.  See \"Patient Info\" tab in inbasket, or \"Choose Columns\" in Meds & Orders section of the refill encounter.              Passed - Medication is active on med list          "

## 2019-06-21 RX ORDER — METOPROLOL SUCCINATE 25 MG/1
25 TABLET, EXTENDED RELEASE ORAL DAILY
Qty: 90 TABLET | Refills: 3 | Status: SHIPPED | OUTPATIENT
Start: 2019-06-21 | End: 2019-06-25

## 2019-06-21 RX ORDER — ROSUVASTATIN CALCIUM 40 MG/1
40 TABLET, COATED ORAL DAILY
Qty: 90 TABLET | Refills: 3 | Status: SHIPPED | OUTPATIENT
Start: 2019-06-21 | End: 2020-01-13

## 2019-06-21 RX ORDER — LEVOTHYROXINE SODIUM 88 UG/1
88 TABLET ORAL DAILY
Qty: 90 TABLET | Refills: 2 | Status: SHIPPED | OUTPATIENT
Start: 2019-06-21 | End: 2020-05-27

## 2019-06-21 NOTE — TELEPHONE ENCOUNTER
Routing refill request to provider for review/approval because:  Medication is reported/historical, provider approval needed.   Last OV: 11/28/2019

## 2019-06-21 NOTE — TELEPHONE ENCOUNTER
"Requested Prescriptions   Pending Prescriptions Disp Refills     metoprolol succinate ER (TOPROL-XL) 25 MG 24 hr tablet       Si/2 tablet daily       Beta-Blockers Protocol Passed - 2019  2:55 PM        Passed - Blood pressure under 140/90 in past 12 months     BP Readings from Last 3 Encounters:   18 104/60   18 98/66   10/16/18 110/64                 Passed - Patient is age 6 or older        Passed - Recent (12 mo) or future (30 days) visit within the authorizing provider's specialty     Patient had office visit in the last 12 months or has a visit in the next 30 days with authorizing provider or within the authorizing provider's specialty.  See \"Patient Info\" tab in inbasket, or \"Choose Columns\" in Meds & Orders section of the refill encounter.              Passed - Medication is active on med list          "

## 2019-06-24 NOTE — TELEPHONE ENCOUNTER
Pt states the pharmacy will not fill his Metoprolol RX due to unclear directions, RX states to take one tablet and then to take 1/2 tablet daily.  Pt states he has been taking 1/2 tablet daily for many years and has not been told to change the dose.     Please send a new RX to [facility] [address] [facility] - [place], [place] - [address] and then call pt when this is complete.      Kelsey Jara RN/FNA

## 2019-06-25 DIAGNOSIS — I48.20 CHRONIC ATRIAL FIBRILLATION (H): ICD-10-CM

## 2019-06-25 RX ORDER — METOPROLOL SUCCINATE 25 MG/1
12.5 TABLET, EXTENDED RELEASE ORAL DAILY
Qty: 45 TABLET | Refills: 3 | Status: SHIPPED | OUTPATIENT
Start: 2019-06-25 | End: 2020-05-27

## 2019-06-25 NOTE — TELEPHONE ENCOUNTER
Pt called and is very upset this has been taken care of-states needs the orefill and the order needs to be changed so it reads 25 mg take 1 take daily-said is not out of the medication and must have asap-please call pt when has been ordered

## 2019-06-25 NOTE — TELEPHONE ENCOUNTER
Please clarify directions for metoprolol:  Take 1 tablet (25 mg) by mouth daily 1/2 tablet daily    Please route back to triage so we can call patient

## 2019-06-25 NOTE — TELEPHONE ENCOUNTER
Patient returned call to clinic.  Advised Rx was sent in by provider.  Patient requested to know when it was sent in, informed patient of when Rx was sent in.    TALA DejesusN, RN  [employer] [facility]

## 2019-06-26 NOTE — PATIENT INSTRUCTIONS
TOPROL XL 25MG  1/2 TABLET ORDERED DAILY     TO CONTROL HEART RATE FOR ATRIAL FIBRILLATION      THIS IS WHAT I ORDERED     DOES HE WANT ANYTHING DIFFERENT FROM THAT     [facility] WAS ORDERING FOR THE LAST 10 YEARS    RINKU TONEY JR., MD

## 2019-06-26 NOTE — TELEPHONE ENCOUNTER
TOPROL XL 25MG  1/2 TABLET ORDERED DAILY     TO CONTROL HEART RATE FOR ATRIAL FIBRILLATION      THIS IS WHAT I ORDERED     DOES HE WANT ANYTHING DIFFERENT FROM THAT     [facility] WAS ORDERING FOR THE LAST 10 YEARS    RINKU TONEY JR., MD

## 2019-07-19 DIAGNOSIS — I25.10 CORONARY ARTERY DISEASE INVOLVING NATIVE CORONARY ARTERY OF NATIVE HEART WITHOUT ANGINA PECTORIS: ICD-10-CM

## 2019-07-19 NOTE — TELEPHONE ENCOUNTER
Reason for Call:  Medication or medication refill:    Do you use a [facility]?  Name of the pharmacy and phone number for the current request:  [facility] [employer], [address] , [place]    Name of the medication requested: sotalol (BETAPACE)    Other request: n/a    Can we leave a detailed message on this number? YES    Phone number patient can be reached at: Cell number on file:    Telephone Information:   Mobile [phone]       Best Time: any    Call taken on 7/19/2019 at 3:35 PM by Ifrah Stapleton

## 2019-07-19 NOTE — TELEPHONE ENCOUNTER
"Requested Prescriptions   Pending Prescriptions Disp Refills     sotalol (BETAPACE) 80 MG tablet  Last Written Prescription Date:  12/20/2018  Last Fill Quantity: 180 tablet,  # refills: 3   Last office visit: 11/29/2018 with prescribing provider:  MARYAM Beckett   Future Office Visit:     180 tablet 3       Beta-Blockers Protocol Passed - 7/19/2019  3:37 PM        Passed - Blood pressure under 140/90 in past 12 months     BP Readings from Last 3 Encounters:   11/29/18 104/60   11/09/18 98/66   10/16/18 110/64                 Passed - Patient is age 6 or older        Passed - Recent (12 mo) or future (30 days) visit within the authorizing provider's specialty     Patient had office visit in the last 12 months or has a visit in the next 30 days with authorizing provider or within the authorizing provider's specialty.  See \"Patient Info\" tab in inbasket, or \"Choose Columns\" in Meds & Orders section of the refill encounter.              Passed - Medication is active on med list           "

## 2019-07-22 RX ORDER — SOTALOL HYDROCHLORIDE 80 MG/1
TABLET ORAL
Qty: 180 TABLET | Refills: 1 | Status: SHIPPED | OUTPATIENT
Start: 2019-07-22 | End: 2020-01-10

## 2019-07-22 NOTE — TELEPHONE ENCOUNTER
Requesting different pharmacy.  Prescription approved per [facility] Refill Protocol.

## 2019-09-10 ENCOUNTER — TELEPHONE (OUTPATIENT)
Dept: CARDIOLOGY | Facility: CLINIC | Age: 74
End: 2019-09-10

## 2019-09-10 ENCOUNTER — DOCUMENTATION ONLY (OUTPATIENT)
Dept: CARE COORDINATION | Facility: CLINIC | Age: 74
End: 2019-09-10

## 2019-09-10 NOTE — TELEPHONE ENCOUNTER
[facility] Call Center    Phone Message    May a detailed message be left on voicemail: yes    Reason for Call: Order(s): Other:   Reason for requested: Stress test  Date needed: 11/05/2019  Provider name: Dr Aguilera      Pt has requested to establish care at [facility] in Vascular. Have scheduled New Vascular pt appt on 11/5/2019 with Dr Aguilera. Previous Cardiologist is Dr Westbrook. Pt usually would have a Stress Test prior to providers appt. He would like to do a Stress Test. Please review pt chart and place order. Please have someone call pt to schedule stress test if he is to have a stress test.       Action Taken: Message routed to:  [facility] ([facility]):  Cardiology

## 2019-09-11 NOTE — TELEPHONE ENCOUNTER
RECORDS RECEIVED FROM: Internal/Care Everywhere   DATE RECEIVED: 11-5-19   NOTES STATUS DETAILS   OFFICE NOTE from referring provider    N/A    OFFICE NOTE from other vascular specilists Internal Last saw Dr. Westbrook at [facility]   DISCHARGE SUMMARY from hospital    N/A    DISCHARGE REPORT from the ER   N/A    OPERATIVE REPORT    N/A    MEDICATION LIST   Internal    LABS     Most recent labs within 3 months            (most recent result of each lab test) N/A Last lab test was 11-29-18   IMAGING (DISC & REPORT)      Duplex Ultrasounds   N/A    MRI/MRA   N/A    Cath   N/A    CT/CTA Scan   N/A

## 2019-09-16 PROBLEM — S76.319A HAMSTRING MUSCLE STRAIN: Status: RESOLVED | Noted: 2018-11-02 | Resolved: 2019-09-16

## 2019-10-21 ENCOUNTER — OFFICE VISIT (OUTPATIENT)
Dept: FAMILY MEDICINE | Facility: CLINIC | Age: 74
End: 2019-10-21
Payer: COMMERCIAL

## 2019-10-21 VITALS
OXYGEN SATURATION: 96 % | HEART RATE: 67 BPM | WEIGHT: 207.5 LBS | BODY MASS INDEX: 32.02 KG/M2 | SYSTOLIC BLOOD PRESSURE: 102 MMHG | TEMPERATURE: 97.4 F | DIASTOLIC BLOOD PRESSURE: 70 MMHG

## 2019-10-21 DIAGNOSIS — E78.00 PURE HYPERCHOLESTEROLEMIA: ICD-10-CM

## 2019-10-21 DIAGNOSIS — E03.8 TSH (THYROID-STIMULATING HORMONE DEFICIENCY): ICD-10-CM

## 2019-10-21 DIAGNOSIS — M77.8 EXTENSOR TENDINITIS OF FOOT: Primary | ICD-10-CM

## 2019-10-21 DIAGNOSIS — E03.9 ACQUIRED HYPOTHYROIDISM: ICD-10-CM

## 2019-10-21 DIAGNOSIS — R73.9 HYPERGLYCEMIA: ICD-10-CM

## 2019-10-21 LAB — ERYTHROCYTE [SEDIMENTATION RATE] IN BLOOD BY WESTERGREN METHOD: 8 MM/H (ref 0–20)

## 2019-10-21 PROCEDURE — 84443 ASSAY THYROID STIM HORMONE: CPT | Performed by: FAMILY MEDICINE

## 2019-10-21 PROCEDURE — 83036 HEMOGLOBIN GLYCOSYLATED A1C: CPT | Performed by: FAMILY MEDICINE

## 2019-10-21 PROCEDURE — 36415 COLL VENOUS BLD VENIPUNCTURE: CPT | Performed by: FAMILY MEDICINE

## 2019-10-21 PROCEDURE — 80048 BASIC METABOLIC PNL TOTAL CA: CPT | Performed by: FAMILY MEDICINE

## 2019-10-21 PROCEDURE — 99214 OFFICE O/P EST MOD 30 MIN: CPT | Performed by: FAMILY MEDICINE

## 2019-10-21 PROCEDURE — 85652 RBC SED RATE AUTOMATED: CPT | Performed by: FAMILY MEDICINE

## 2019-10-21 NOTE — PROGRESS NOTES
Subjective     Jonathon Guerra is a 74 year old male who presents to clinic today for the following health issues:    HPI   Musculoskeletal problem/pain  Left dorsal friction tendinitis  Worsened after trip to [place]  Walks 7 to 10 miles per day      Duration: months    Description  Location: top of left foot    Intensity:  mild    Accompanying signs and symptoms: pain    History  Previous similar problem: no   Previous evaluation:  none    Precipitating or alleviating factors:  Trauma or overuse: no   Aggravating factors include: walking and overuse    Therapies tried and outcome: heat, ice and aspercream  Objective findings  Normal metatarsal phalangeal joints  Tenderness dorsum of the left foot  Assessment extensor tendinitis left foot  Plan  Relative rest ice elevation  Therapy trial Voltaren gel 1 2 g 4 times daily  Ice before and after exercise  Cut back on total mileage  He is interval training  Check choose to make sure that there are no tight fitting shoes  Hypothyroidism well-controlled  Levothyroxine 88 mcg/day and to monitor thyroid level  Patient is on both Betapace and sotalol both beta-blockers  From the cardiologist  Rosuvastatin 40 mg daily  For hyperlipidemia and known artery disease  Tylenol pain  Aspirin for stroke prevention  Vitamin D replacement  Taking diphenhydramine and melatonin for sleep  He has nitroglycerin on hand in case he has angina but he has not  History of borderline hyperglycemia  He did have some food to eat so I ordered see sugar    .        There are no preventive care reminders to display for this patient.          .  Current Outpatient Medications   Medication Sig Dispense Refill     Acetaminophen 650 MG TABS Take by mouth daily       aspirin 325 MG tablet Take 325 mg by mouth daily.       cholecalciferol (VITAMIN D3) 1000 UNIT tablet Take 1 tablet by mouth daily        diclofenac (VOLTAREN) 1 % topical gel Apply 2 g topically 4 times daily 200 g 1      Diphenhydramine-APAP, sleep, (ACETAMINOPHEN PM PO) Take 500 mg by mouth       levothyroxine (SYNTHROID/LEVOTHROID) 88 MCG tablet Take 1 tablet (88 mcg) by mouth daily 90 tablet 2     MELATONIN PO Take 10 mg by mouth At Bedtime        metoprolol succinate ER (TOPROL-XL) 25 MG 24 hr tablet Take 0.5 tablets (12.5 mg) by mouth daily 1/2 tablet daily 45 tablet 3     Multiple Vitamin (MULTI-VITAMIN PO) Take by mouth daily        rosuvastatin (CRESTOR) 40 MG tablet Take 1 tablet (40 mg) by mouth daily 90 tablet 3     sotalol (BETAPACE) 80 MG tablet TAKE 1 TABLET BY MOUTH 2 TIMES A DAY. 180 tablet 1     Nitroglycerin (NITROSTAT SL) Place 0.4 mg under the tongue.              Allergies   Allergen Reactions     Atorvastatin      Lipitor--muscle aches         Immunization History   Administered Date(s) Administered     HepB-Adult 2017, 2017     Hepatitis A Immunity: Titer/MD Dx 2017     Influenza (High Dose) 3 valent vaccine 10/28/2014, 2015, 2016, 10/04/2017, 10/16/2018, 2019     MMR Not Indicated - By Titer 2017     Pneumo Conj 13-V (2010&after) 2017     Pneumococcal 23 valent 10/28/2014     Rabies - IM Diploid Cell Culture 2017     Rabies - IM Fibroblast Culture 2017, 2017     TDAP Vaccine (Adacel) 10/28/2014     Typhoid IM 2017     Zoster vaccine recombinant adjuvanted (SHINGRIX) 2019, 10/03/2019               reports no history of alcohol use.        reports no history of drug use.      family history includes Cancer in his brother and father; Other - See Comments in his mother.      He indicated that his mother is . He indicated that his father is . He indicated that only one of his two sisters is alive. He indicated that four of his six brothers are alive.         has a past surgical history that includes hernia repair, inguinal rt/lt and other surgical history.       reports being sexually active and has had partner(s) who are  Male.    .  Pediatric History   Patient Guardians     Not on file     Patient does not qualify to have social determinant information on file (likely too young).   Other Topics Concern     Parent/sibling w/ CABG, MI or angioplasty before 65F 55M? No      Service Not Asked     Blood Transfusions Not Asked     Caffeine Concern No     Occupational Exposure Not Asked     Hobby Hazards Not Asked     Sleep Concern No     Stress Concern Not Asked     Weight Concern Not Asked     Special Diet No     Back Care Not Asked     Exercise Yes     Comment: running or walking      Bike Helmet Not Asked     Seat Belt No     Self-Exams Not Asked   Social History Narrative    --------------------------------------------------------------------------------    Surgical History  Return To Top     Status Surgery Time Frame Comment Record Date     Inactive  inguinal hernia      8/26/2008      Inactive  Surgery  UPPER AND LOWER PARTIAL PLATE    8/26/2008          --------------------------------------------------------------------------------    Food Allergy  Return To Top     Allergen Reaction Comment Record Date     * No known food allergies      8/26/2008          --------------------------------------------------------------------------------    Drug Allergy  Return To Top     Allergen Reaction Comment Record Date     Penicillin      5/6/2009      LIPITOR  MUSCLE ACHES    6/5/2007          --------------------------------------------------------------------------------    Environment Allergy  Return To Top     Allergen Reaction Comment Record Date     * No known environmental allergies  RASH    8/26/2008          --------------------------------------------------------------------------------    Immunization History Return To Top     Funding Source Vaccine Type of Vaccine Date Given Route Site Given Lot#  Exp. Date Date on VIS Date Given VIS Vaccinator Note       Hepatitis A #1 (Adult)  HepA - Adult  6/6/2007  IM   Left Deltoid  [id number]  GSK    03/21/2006 06/06/2007  LUIS ALFREDO Jean CMA          Hepatitis A #2(Adult)  HepA - Adult  8/26/2008  IM  Rightt Deltoid  [id number]  GSK  9/4/2010 03/21/2006 8/26/2008  KODI Love          Hepatitis B #1 (Adult)  HepB -Adult  6/6/2007  IM  Right Deltoid  [id number]  GSK    07/11/2001 06/06/2007  LUIS ALFREDO Jean CMA          Hepatitis B #2 (Adult)  HepB -Adult  8/26/2008 8/26/2008            Herpes Zoster (Zostavax) age 60 +  Herpes Zoster  8/26/2008 8/26/2008            Herpes Zoster (Zostavax) age 60 +  Herpes Zoster  9/2/08  SQ  Right Deltoid  [id number]  MRK  7/25/09 9/11/06 9/2/08  [facility] MA        Private; Private  Influenza (Adult) Seasonal  Flu (>= 3yrs)  9/23/2009  IM; IM  Left Deltoid; Left Deltoid  [id number]  SP; SP  06/30/2010 8/11/2009 9/23/2009  NEISHA Solorzano LPN            --------------------------------------------------------------------------------    Social History  Return To Top     Question Answer Comment Record Date     Advance Directive or Living Will  Form Given to Patient    8/26/2008      Emotional Abuse  No    1/20/2012      Exercise  Yes    8/26/2008      Physical Abuse  No    1/20/2012      Sexual Abuse  No    1/20/2012      Tobacco history  Has never smoked or chewed tobacco    8/26/2008      Alcohol history  Never drinks alcohol    8/26/2008      Has the patient ever used illegal drugs?  Has never used illegal drugs    1/20/2012          --------------------------------------------------------------------------------    History - Overall Remark: 1/20/2012 Return To Top     MEDICATIONS (including any changes made today):    1. Fish Oil 1200 mg, 1 p.o. twice daily    2. Niacin 500 mg Tablet, 1 twice daily    3. Vitamin B Complex Tablet, 1 p.o. daily    4. Levothyroxine 125 mcg Tablet, 1 p.o. daily    5. Aspirin 325 mg Tablet, 1 p.o. daily    6. Red Yeast Rice Extract 600 mg Capsule, 1200mg po BID    7. Nitroglycerin 0.4 mg Tablet, Sublingual, as  directed for chest pain    8. Toprol XL 25 mg Tablet Sustained Release 24 hr, 1 p.o. twice daily    9. Centrum Silver Tablet, 1 p.o. daily    10. Calcium And Magnesium 750-465mg Tablet, 1 p.o. daily    11. Testosterone 50 mg/mI, Pt use 90mg    12. Tikosyn 250 mcg Capsule, 2 p.o. twice daily    13. Trazodone 50mg Tablet, 2 po at HS daily PRN sleep    --------------------------------------------------------------------------------    Medical History Return To Top     Status Diagnosis Time Frame Comment Record Date     Active (466.0) - C - Bronchitis, acute      1/20/2012      Active (790.93) - C - Elevated PSA      6/23/2011      Active (V74.5) - C - Screening, STD      6/23/2011      Active (244.9) - C - Hypothyroidism      10/22/2010      Active (V76.44) - C - Screening, prostate      8/26/2010      Active (380.4) - C - Ceruminosis      8/26/2010      Active (427.31) - C - Atrial fibrillation      8/26/2010      Active 780.52 Insomnia      5/26/2009      Active (427.31) - C - Atrial fibrillation      5/26/2009      Active (530.81) - C - GERD      5/6/2009      Active 427.31 Atrial fibrillation      5/4/2009      Active 719.44 Hand pain      1/22/2009      Active (719.44) - C - Hand pain      1/21/2009      Active 780.79 Fatigue      9/2/2008      Active V05.8 Immunization, other, specified      9/2/2008      Active (272.4) - C - Hyperlipidemia      8/26/2008      Active (V05.8) - C - Immunization, other, specified      8/26/2008      Active (607.84) - C - Erectile Dysfunction      8/26/2008      Active 708.9 UNSP URTICARIA      6/6/2007      Active V05.3 Hepatitis vaccination      6/6/2007      Active 716.96 ARTHROPATHY UNSP LEG      6/6/2007      Active (V58.69) - C - Other medication management      5/29/2007      Active (V70.0) - C - Routine medical exam      5/29/2007      Active (V76.51) - C - Screening, colon      5/29/2007      Inactive  (427.31) - C - Atrial fibrillation    GOOD CONTROL NOW CONVERTED LAST  2010      Inactive  (V04.81) - C - Influenza vaccination      2009      Inactive  (427.31) - C - Atrial fibrillation      2009      Inactive  380.4 Ceruminosis    LEFT EAR  2009      Inactive  V74.5 Screening, STD      2009      Inactive  466.0 Bronchitis, acute      2009                       reports that he quit smoking about 26 years ago. He started smoking about 53 years ago. He has never used smokeless tobacco.        Medical, social, surgical, and family histories reviewed.        Labs reviewed in EPIC  Patient Active Problem List   Diagnosis     Elevated prostate specific antigen (PSA)     Hypothyroidism     Atrial fibrillation (H)     Insomnia     Gastroesophageal reflux disease without esophagitis     Hyperlipidemia     Impotence of organic origin     Urticaria     Arthropathy, lower leg     Advance Care Planning     Hyperlipidemia     Coronary artery disease     Tinea corporis     Pure hypercholesterolemia     SUKH (obstructive sleep apnea)     Benign paroxysmal positional vertigo, right     Sciatica without back pain, left       Past Surgical History:   Procedure Laterality Date     HERNIA REPAIR, INGUINAL RT/LT       OTHER SURGICAL HISTORY      upper and lower partial plate         Social History     Tobacco Use     Smoking status: Former Smoker     Start date: 1966     Last attempt to quit: 1992     Years since quittin.9     Smokeless tobacco: Never Used   Substance Use Topics     Alcohol use: No       Family History   Problem Relation Age of Onset     Cancer Father         prostate     Cancer Brother      Other - See Comments Mother         old age             Current Outpatient Medications   Medication Sig Dispense Refill     Acetaminophen 650 MG TABS Take by mouth daily       aspirin 325 MG tablet Take 325 mg by mouth daily.       cholecalciferol (VITAMIN D3) 1000 UNIT tablet Take 1 tablet by mouth daily        diclofenac (VOLTAREN) 1 % topical gel  Apply 2 g topically 4 times daily 200 g 1     Diphenhydramine-APAP, sleep, (ACETAMINOPHEN PM PO) Take 500 mg by mouth       levothyroxine (SYNTHROID/LEVOTHROID) 88 MCG tablet Take 1 tablet (88 mcg) by mouth daily 90 tablet 2     MELATONIN PO Take 10 mg by mouth At Bedtime        metoprolol succinate ER (TOPROL-XL) 25 MG 24 hr tablet Take 0.5 tablets (12.5 mg) by mouth daily 1/2 tablet daily 45 tablet 3     Multiple Vitamin (MULTI-VITAMIN PO) Take by mouth daily        rosuvastatin (CRESTOR) 40 MG tablet Take 1 tablet (40 mg) by mouth daily 90 tablet 3     sotalol (BETAPACE) 80 MG tablet TAKE 1 TABLET BY MOUTH 2 TIMES A DAY. 180 tablet 1     Nitroglycerin (NITROSTAT SL) Place 0.4 mg under the tongue.           Recent Labs   Lab Test 11/29/18  1525 10/30/18  0818 10/16/18  1455 01/15/18  0756 08/17/17  0744 09/20/16  0938   LDL  --  55  --  50 85 83   HDL  --  42  --  60 50 68   TRIG  --  71  --  97 80 53   ALT  --   --   --  27 <5* 30   CR  --   --  0.71  --   --  0.80   GFRESTIMATED  --   --  >90  --   --  >90   GFRESTBLACK  --   --  >90  --   --  >90   POTASSIUM  --   --  4.0  --   --  4.4   TSH 0.85  --  0.25* 2.43  --  1.07            BP Readings from Last 6 Encounters:   10/21/19 102/70   11/29/18 104/60   11/09/18 98/66   10/16/18 110/64   11/21/17 102/60   08/30/17 118/70         Wt Readings from Last 3 Encounters:   10/21/19 94.1 kg (207 lb 8 oz)   11/29/18 83 kg (183 lb)   11/09/18 83.5 kg (184 lb)               Positive symptoms or findings indicated by bold designation:         ROS: 10 point ROS neg other than the symptoms noted above in the HPI.except  has Elevated prostate specific antigen (PSA); Hypothyroidism; Atrial fibrillation (H); Insomnia; Gastroesophageal reflux disease without esophagitis; Hyperlipidemia; Impotence of organic origin; Urticaria; Arthropathy, lower leg; Advance Care Planning; Hyperlipidemia; Coronary artery disease; Tinea corporis; Pure hypercholesterolemia; SUKH (obstructive sleep  apnea); Benign paroxysmal positional vertigo, right; and Sciatica without back pain, left on their problem list.  Review Of Systems    Skin: negative    Eyes: negative    Ears/Nose/Throat: negative    Respiratory: No shortness of breath, dyspnea on exertion, cough, or hemoptysis    Cardiovascular: negative    Gastrointestinal: negative    Genitourinary: negative    Musculoskeletal: joint pain and joint swelling    Neurologic: negative    Psychiatric: negative    Hematologic/Lymphatic/Immunologic: negative    Endocrine: negative                PE:  /70 (Cuff Size: Adult Large)   Pulse 67   Temp 97.4  F (36.3  C) (Tympanic)   Wt 94.1 kg (207 lb 8 oz)   SpO2 96%   BMI 32.02 kg/m   Body mass index is 32.02 kg/m .        Constitutional: general appearance, well nourished, well developed, in no acute distress, well developed, appears stated age, normal body habitus,     Borderline obesity        Eyes:; The patient has normal eyelids sclerae and conjunctivae :          Ears/Nose/Throat: external ear, overall: normal appearance; external nose, overall: benign appearance, normal moujth gums and lips           Neck: thyroid, overall: normal size, normal consistency, nontender,           Respiratory:  palpation of chest, overall: normal excursion,       Clear to percussion and auscultation     NO Tachypnea     NORMAL  Color          Cardiovascular:  Good color with no peripheral edema    Regular sinus rhythm without murmur.  Physiologic heart sounds   Heart is unelarged    .     Chest/Breast: normal shape            Abdominal exam,  Liver and spleen are  unenlarged        Tenderness    Scars              Urogenital; no renal, flank or bladder  tenderness;          Lymphatic: neck nodes,     Other nodes NOT APPLICABLE         Musculoskeletal:  Brief ortho exam normal except:   Tenderness of the dorsum of the left forefoot MTP pain        Integument: inspection of skin, no rash, lesions; and, palpation, no induration,  no tenderness.          Neurologic mental status, overall: alert and oriented; gait, no ataxia, no unsteadiness; coordination, no tremors; cranial nerves, overall: normal motor, overall: normal bulk, tone.          Psychiatric: orientation/consciousness, overall: oriented to person, place and time; behavior/psychomotor activity, no tics, normal psychomotor activity; mood and affect, overall: normal mood and affect; appearance, overall: well-groomed, good eye contact; speech, overall: normal quality, no aphasia and normal quality, quantity, intact.        Diagnostic Test Results:  Results for orders placed or performed in visit on 11/29/18   TSH   Result Value Ref Range    TSH 0.85 0.40 - 4.00 mU/L           ICD-10-CM    1. Extensor tendinitis of foot M77.9 diclofenac (VOLTAREN) 1 % topical gel     Erythrocyte sedimentation rate auto   2. Acquired hypothyroidism E03.9    3. TSH (thyroid-stimulating hormone deficiency) E03.8 TSH   4. Hyperglycemia R73.9 Hemoglobin A1c   5. Pure hypercholesterolemia E78.00 Basic metabolic panel     CANCELED: Lipid panel reflex to direct LDL Fasting     CANCELED: ALT              .    Side effects benefits and risks thoroughly discussed. .he may come in early if unimproved or getting worse          Please drink 2 glasses of water prior to meals and walk 15-30 minutes after meals        I spent 25 minutes with patient discussing the following issues   The primary encounter diagnosis was Extensor tendinitis of foot. Diagnoses of Acquired hypothyroidism, TSH (thyroid-stimulating hormone deficiency), Hyperglycemia, and Pure hypercholesterolemia were also pertinent to this visit. over half of which involved counseling and coordination of care.      Patient Instructions   GOOD   TUMERIC  BERRIES   IAN  CINNAMON   COLLAGEN 2   CHICKEN SOUP   PAPAYA AND PINEAPPLE  ORANGE AND TANGERINES AND GRAPEFRUIT  SPINACH AND KALE   KEFIR  AND YOGURT   FISH OIL   Avoid SUGAR  TUMERIC     BAD   AVOID  GREEN AND RED PEPPER  AVOID SUGAR  AND POTATOES AND GLUTEN  WHEAT PRODUCTS  TOMATOES  WORSENED JOINTS  .(M77.9) Extensor tendinitis of foot  (primary encounter diagnosis)  Comment:    Plan: diclofenac (VOLTAREN) 1 % topical gel,         Erythrocyte sedimentation rate auto             (E03.9) Acquired hypothyroidism  Comment:    Plan:       (E03.8) TSH (thyroid-stimulating hormone deficiency)  Comment:      Plan: TSH           With all the stuff going on the background of  (R73.9) Hyperglycemia  Comment:     Plan: Hemoglobin A1c             (E78.00) Pure hypercholesterolemia  Comment:    Plan: Basic metabolic panel, CANCELED: Lipid panel         reflex to direct LDL Fasting, CANCELED: ALT  RINKU TONEY JR., MD                         ALL THE ABOVE PROBLEMS ARE STABLE AND MED CHANGES AS NOTED        Diet: Mediterranean diet        Exercise: Cut back on the walking exercises Range of motion, balance, isometric, and strengthening exercises 30 repetitions twice daily of involved joints            .RINKU TONEY MD 10/21/2019 12:58 PM  October 21, 2019

## 2019-10-22 LAB
ANION GAP SERPL CALCULATED.3IONS-SCNC: 7 MMOL/L (ref 3–14)
BUN SERPL-MCNC: 25 MG/DL (ref 7–30)
CALCIUM SERPL-MCNC: 8.8 MG/DL (ref 8.5–10.1)
CHLORIDE SERPL-SCNC: 107 MMOL/L (ref 94–109)
CO2 SERPL-SCNC: 25 MMOL/L (ref 20–32)
CREAT SERPL-MCNC: 0.77 MG/DL (ref 0.66–1.25)
GFR SERPL CREATININE-BSD FRML MDRD: >90 ML/MIN/{1.73_M2}
GLUCOSE SERPL-MCNC: 75 MG/DL (ref 70–99)
HBA1C MFR BLD: 5.1 % (ref 0–5.6)
POTASSIUM SERPL-SCNC: 4.8 MMOL/L (ref 3.4–5.3)
SODIUM SERPL-SCNC: 139 MMOL/L (ref 133–144)
TSH SERPL DL<=0.005 MIU/L-ACNC: 1.41 MU/L (ref 0.4–4)

## 2019-10-31 DIAGNOSIS — E78.5 HYPERLIPIDEMIA, UNSPECIFIED HYPERLIPIDEMIA TYPE: Primary | ICD-10-CM

## 2019-11-01 ENCOUNTER — ANCILLARY PROCEDURE (OUTPATIENT)
Dept: GENERAL RADIOLOGY | Facility: CLINIC | Age: 74
End: 2019-11-01
Attending: FAMILY MEDICINE
Payer: COMMERCIAL

## 2019-11-01 ENCOUNTER — OFFICE VISIT (OUTPATIENT)
Dept: ORTHOPEDICS | Facility: CLINIC | Age: 74
End: 2019-11-01
Payer: COMMERCIAL

## 2019-11-01 VITALS
BODY MASS INDEX: 31.94 KG/M2 | WEIGHT: 207 LBS | SYSTOLIC BLOOD PRESSURE: 117 MMHG | DIASTOLIC BLOOD PRESSURE: 74 MMHG | RESPIRATION RATE: 16 BRPM

## 2019-11-01 DIAGNOSIS — M25.572 PAIN IN LEFT ANKLE AND JOINTS OF LEFT FOOT: Primary | ICD-10-CM

## 2019-11-01 DIAGNOSIS — M25.572 PAIN IN LEFT ANKLE AND JOINTS OF LEFT FOOT: ICD-10-CM

## 2019-11-01 NOTE — PROGRESS NOTES
SPORTS & ORTHOPEDIC WALK-IN VISIT 11/1/2019    Primary Care Physician: Dr. Beckett     Saw PC and was given Diclofenac and that did not help, so he went and got CBD lotion and that did not help. Thinks that the Diclofenac actually made his foot swell more. Couple months ago foot became sore near the talar dome, went on vacation and walked 2x a day and became better, got home from vacation and went for a 5 mile walk and it became much worse. Gets a massage every other week, and he felt some tight muscles in there. PC said it was a tendonitis. Pain dorsal flexion but not plantar flexion, No pain with inversion or eversion.     Reason for visit:     What part of your body is injured / painful?  left foot    What caused the injury /pain? Unsure    How long ago did your injury occur or pain begin? Couple months     What are your most bothersome symptoms? Pain and Swelling    How would you characterize your symptom?  aching    What makes your symptoms better? Nothing    What makes your symptoms worse? Walking    Have you been previously seen for this problem? Yes,     Medical History:    Any recent changes to your medical history? No    Any new medication prescribed since last visit? No    Have you had surgery on this body part before? No    Social History:    Occupation: Retired    Handedness: Right    Exercise: Most days/week    Review of Systems:    Do you have fever, chills, weight loss? No    Do you have any vision problems? No    Do you have any chest pain or edema? No    Do you have any shortness of breath or wheezing?  No    Do you have stomach problems? No    Do you have any numbness or focal weakness? No    Do you have diabetes? No    Do you have problems with bleeding or clotting? No    Do you have an rashes or other skin lesions? No

## 2019-11-01 NOTE — LETTER
2019       RE: Jonathon Guerra  [address]   [facility] [id number]     Dear Colleague,    Thank you for referring your patient, Jonathon Guerra, to the [facility] SPORTS AND ORTHOPAEDIC WALK IN CLINIC at [facility]. Please see a copy of my visit note below.    [facility]  Orthopedics  Rafy Reddy, DO  2019     Name: Jonathon Guerra  MRN: [id number]  Age: 74 year old  : 1945  Referring provider: Referred Self     Chief Complaint:  Left foot pain    History of Present Illness:   Jonathon Guerra is a 74 year old male who presents today for evaluation of left ankle pain. The patient states that over the last few months he has been experiencing increasing anterior medial left ankle pain after he goes on long walks. The patient states that this sometime radiates back to his heel. Patient states that he is wearing good, supportive walking shoes while he does this. The patient saw his primary for this who thought this could be extensor tendonitis and placed the patient on Diclofenac. Of note, the provider did not prescribe the patient oral anti-inflammatories due to underlying heart conditions. The patient states that this did not help his symptoms and he tried CBD lotion as well with no improvement.  Pain does not radiate into forefoot. No numbness or tingling.        What part of your body is injured / painful?  left foot    What caused the injury /pain? Unsure    How long ago did your injury occur or pain begin? Couple months     What are your most bothersome symptoms? Pain and Swelling    How would you characterize your symptom?  aching    What makes your symptoms better? Nothing    What makes your symptoms worse? Walking    Have you been previously seen for this problem? Yes,     Review of Systems:   A 10-point review of systems was obtained and is negative except for as noted in the HPI.     Medications:     Current Outpatient Medications:       Acetaminophen 650 MG TABS, Take by mouth daily, Disp: , Rfl:      aspirin 325 MG tablet, Take 325 mg by mouth daily., Disp: , Rfl:      cholecalciferol (VITAMIN D3) 1000 UNIT tablet, Take 1 tablet by mouth daily , Disp: , Rfl:      diclofenac (VOLTAREN) 1 % topical gel, Apply 2 g topically 4 times daily, Disp: 200 g, Rfl: 1     Diphenhydramine-APAP, sleep, (ACETAMINOPHEN PM PO), Take 500 mg by mouth, Disp: , Rfl:      levothyroxine (SYNTHROID/LEVOTHROID) 88 MCG tablet, Take 1 tablet (88 mcg) by mouth daily, Disp: 90 tablet, Rfl: 2     MELATONIN PO, Take 10 mg by mouth At Bedtime , Disp: , Rfl:      metoprolol succinate ER (TOPROL-XL) 25 MG 24 hr tablet, Take 0.5 tablets (12.5 mg) by mouth daily 1/2 tablet daily, Disp: 45 tablet, Rfl: 3     Multiple Vitamin (MULTI-VITAMIN PO), Take by mouth daily , Disp: , Rfl:      Nitroglycerin (NITROSTAT SL), Place 0.4 mg under the tongue., Disp: , Rfl:      rosuvastatin (CRESTOR) 40 MG tablet, Take 1 tablet (40 mg) by mouth daily, Disp: 90 tablet, Rfl: 3     sotalol (BETAPACE) 80 MG tablet, TAKE 1 TABLET BY MOUTH 2 TIMES A DAY., Disp: 180 tablet, Rfl: 1    Allergies:  Atorvastatin     Past Medical History:  Afib  CAD  HDL  HTN  Thyroid disease    Past Surgical History:  Hernia repair     Social History:  Patient lives with his wife.  Retired mail . He denies tobacco use and denies alcohol use.   Denies drug use.    Family History:  No pertinent family history.    Physical Examination:  There were no vitals taken for this visit.  General  - normal appearance, in no obvious distress  HEENT  -Pupils equal, round, no conjunctival injection.  No lid lag  CV  - normal pulses at posterior tib and dorsalis pedis  Pulm  - normal respiratory pattern, non-labored   Musculoskeletal - left ankle  - stance: normal gait without limp, no obvious leg length discrepancy  - inspection: no swelling or effusion, significantly shorter fourth and fifth toes.  - palpation: Tenderness  just distal to the medial malleolus. Tenderness in the distribution of the posterior tibial tendon. Additional tenderness anteriorly in region of anterior ankle joint and extensor digitorum. Lateral ankle in non-tender. No tenderness at the lateral tibialis tendon. soft tissue tenderness. malleoli and tarsal bones non-tender  - ROM: normal dorsiflexion, plantar flexion, inversion, eversion, not painful  - strength: 5/5 in all planes  - special tests:  (-) anterior drawer  (-) talar tilt  (-) Tinel's  Neuro  - no sensory or motor deficit, grossly normal coordination, normal muscle tone  Skin  - no ecchymosis, erythema, warmth, or induration, no obvious rash  Psych  - interactive, appropriate, normal mood and affect    Imaging:   XR LEFT ANKLE AND FOOT:  No significant ankle or mid-foot changes. Large osteophyte and joint space narrowing at first MTP joint.   No acute osseous abnormalities.     I have independently reviewed the above imaging studies; the results were discussed with the patient.     Assessment:   74 year old male presenting with left anterior ankle pain for the past 2 months. Suspected tendonitis vs early ankle degenerative changes. Treatment options discussed and will initiate ankle and foot exercise program and stabilizing brace as outlined below.    Plan:   -Lace-up ankle brace.  -Diclofenac ankle gel  -Orthotic inserts discussed  -HEP provided and demonstrated  - Follow up in 4 weeks if no improvement; consider advanced imaging.    It was a pleasure seeing [place] today.    Rafy Reddy DO, [facility]  Primary Care Sports Medicine    MARITZA, Rafy Reddy DO, have reviewed the above note and agree with the scribe's notation as written.    Scribe Disclosure:  I, Stefan Wilson, am serving as a scribe to document services personally performed by Rafy Reddy DO at this visit, based upon the provider's statements to me. All documentation has been reviewed by the aforementioned provider prior to being entered  into the official medical record.           SPORTS & ORTHOPEDIC WALK-IN VISIT 11/1/2019    Primary Care Physician: Dr. Beckett     Saw PC and was given Diclofenac and that did not help, so he went and got CBD lotion and that did not help. Thinks that the Diclofenac actually made his foot swell more. Couple months ago foot became sore near the talar dome, went on vacation and walked 2x a day and became better, got home from vacation and went for a 5 mile walk and it became much worse. Gets a massage every other week, and he felt some tight muscles in there. PC said it was a tendonitis. Pain dorsal flexion but not plantar flexion, No pain with inversion or eversion.     Reason for visit:     What part of your body is injured / painful?  left foot    What caused the injury /pain? Unsure    How long ago did your injury occur or pain begin? Couple months     What are your most bothersome symptoms? Pain and Swelling    How would you characterize your symptom?  aching    What makes your symptoms better? Nothing    What makes your symptoms worse? Walking    Have you been previously seen for this problem? Yes,     Medical History:    Any recent changes to your medical history? No    Any new medication prescribed since last visit? No    Have you had surgery on this body part before? No    Social History:    Occupation: Retired    Handedness: Right    Exercise: Most days/week    Review of Systems:    Do you have fever, chills, weight loss? No    Do you have any vision problems? No    Do you have any chest pain or edema? No    Do you have any shortness of breath or wheezing?  No    Do you have stomach problems? No    Do you have any numbness or focal weakness? No    Do you have diabetes? No    Do you have problems with bleeding or clotting? No    Do you have an rashes or other skin lesions? No

## 2019-11-02 ASSESSMENT — ENCOUNTER SYMPTOMS: LEG PAIN: 1

## 2019-11-05 ENCOUNTER — PRE VISIT (OUTPATIENT)
Dept: CARDIOLOGY | Facility: CLINIC | Age: 74
End: 2019-11-05

## 2019-11-05 ENCOUNTER — OFFICE VISIT (OUTPATIENT)
Dept: CARDIOLOGY | Facility: CLINIC | Age: 74
End: 2019-11-05
Attending: INTERNAL MEDICINE
Payer: COMMERCIAL

## 2019-11-05 VITALS
HEART RATE: 80 BPM | SYSTOLIC BLOOD PRESSURE: 120 MMHG | WEIGHT: 206 LBS | OXYGEN SATURATION: 96 % | BODY MASS INDEX: 31.22 KG/M2 | DIASTOLIC BLOOD PRESSURE: 84 MMHG | HEIGHT: 68 IN

## 2019-11-05 DIAGNOSIS — E78.00 PURE HYPERCHOLESTEROLEMIA: ICD-10-CM

## 2019-11-05 DIAGNOSIS — I71.40 ABDOMINAL AORTIC ANEURYSM (AAA) WITHOUT RUPTURE (H): Primary | ICD-10-CM

## 2019-11-05 DIAGNOSIS — I25.10 ATHEROSCLEROSIS OF NATIVE CORONARY ARTERY OF NATIVE HEART WITHOUT ANGINA PECTORIS: ICD-10-CM

## 2019-11-05 DIAGNOSIS — E78.5 HYPERLIPIDEMIA, UNSPECIFIED HYPERLIPIDEMIA TYPE: ICD-10-CM

## 2019-11-05 LAB
ALT SERPL W P-5'-P-CCNC: 28 U/L (ref 0–70)
CHOLEST SERPL-MCNC: 135 MG/DL
HDLC SERPL-MCNC: 60 MG/DL
LDLC SERPL CALC-MCNC: 49 MG/DL
NONHDLC SERPL-MCNC: 75 MG/DL
TRIGL SERPL-MCNC: 130 MG/DL

## 2019-11-05 PROCEDURE — 99213 OFFICE O/P EST LOW 20 MIN: CPT | Mod: GC | Performed by: INTERNAL MEDICINE

## 2019-11-05 PROCEDURE — 36415 COLL VENOUS BLD VENIPUNCTURE: CPT | Performed by: INTERNAL MEDICINE

## 2019-11-05 PROCEDURE — G0463 HOSPITAL OUTPT CLINIC VISIT: HCPCS | Mod: ZF

## 2019-11-05 PROCEDURE — 80061 LIPID PANEL: CPT | Performed by: INTERNAL MEDICINE

## 2019-11-05 PROCEDURE — 84460 ALANINE AMINO (ALT) (SGPT): CPT | Performed by: INTERNAL MEDICINE

## 2019-11-05 ASSESSMENT — PAIN SCALES - GENERAL: PAINLEVEL: NO PAIN (0)

## 2019-11-05 ASSESSMENT — MIFFLIN-ST. JEOR: SCORE: 1650.97

## 2019-11-05 NOTE — PROGRESS NOTES
CARDIOLOGY CONSULTATION    Referring Provider:  Jorge Luis Westbrook  Primary Care Provider:   Perez Beckett  Indication for Consultation:  CAD, pAF, establish care    HPI: Jonathon Guerra is a 74 year old male being seen today for evaluation of CAD and pAF.   The patient's risk factor profile is: (-) HTN, (-) DM, (+) hypercholesterolemia, (+)  prior tobacco use, (-) fam Hx premature CAD.    In 2006, Mr. Guerra had a coronary CT (unclear reason why) which demonstrated mild-moderate diffuse CAD which was nonocclusive. He was asymptomatic at this time and started on goal directed medical therapy. He has been a patient of Dr. Westbrook but is looking to establish care closer to home. He has had multiple stress tests since this time, which have shown above-average exercise tolerance and have been negative for ischemia. In 2009, he developed atrial fibrillation. He had multiple failed cardioversions. He was started on sotalol and his AF has been under good control since. He was recommended to start anticoagulation, however has been resistant to this and has been on 325mg of aspirin as protection. His HLD has been managed with statin therapy and most recently with LDL 49, HDL 60, and .    Mr. Guerra states he feels well. He denies any symptoms of angina, palpitations. Denies SOB, orthopnea, PND. He recently developed extensor fascitiis, so does not exercise as much in the past couple of weeks, but previously very active. He is compliant with medications and has no side-effects.    Mr. Guerra lives in [facility]. He is a retired . Former smoker, but quit >20 years ago.        PAST MEDICAL HISTORY:  Past Medical History:   Diagnosis Date     Atrial fibrillation (H)      Coronary artery disease      Hyperlipidemia      Hypertension      Thyroid disease        CURRENT MEDICATIONS:  Current Outpatient Medications   Medication Sig     Acetaminophen 650 MG TABS Take by mouth daily     aspirin 325 MG  tablet Take 325 mg by mouth daily.     cholecalciferol (VITAMIN D3) 1000 UNIT tablet Take 1 tablet by mouth daily      diclofenac (VOLTAREN) 1 % topical gel Apply 2 g topically 4 times daily     Diphenhydramine-APAP, sleep, (ACETAMINOPHEN PM PO) Take 500 mg by mouth     levothyroxine (SYNTHROID/LEVOTHROID) 88 MCG tablet Take 1 tablet (88 mcg) by mouth daily     MELATONIN PO Take 10 mg by mouth At Bedtime      metoprolol succinate ER (TOPROL-XL) 25 MG 24 hr tablet Take 0.5 tablets (12.5 mg) by mouth daily 1/2 tablet daily     Multiple Vitamin (MULTI-VITAMIN PO) Take by mouth daily      Nitroglycerin (NITROSTAT SL) Place 0.4 mg under the tongue.     rosuvastatin (CRESTOR) 40 MG tablet Take 1 tablet (40 mg) by mouth daily     sotalol (BETAPACE) 80 MG tablet TAKE 1 TABLET BY MOUTH 2 TIMES A DAY.     No current facility-administered medications for this visit.        PAST SURGICAL HISTORY:  Past Surgical History:   Procedure Laterality Date     HERNIA REPAIR, INGUINAL RT/LT       OTHER SURGICAL HISTORY      upper and lower partial plate       ALLERGIES  Atorvastatin    FAMILY HX:  Family History   Problem Relation Age of Onset     Cancer Father         prostate     Cancer Brother      Other - See Comments Mother         old age       SOCIAL HX:  Social History     Socioeconomic History     Marital status: Single     Spouse name: Not on file     Number of children: Not on file     Years of education: Not on file     Highest education level: Not on file   Occupational History     Not on file   Social Needs     Financial resource strain: Not on file     Food insecurity:     Worry: Not on file     Inability: Not on file     Transportation needs:     Medical: Not on file     Non-medical: Not on file   Tobacco Use     Smoking status: Former Smoker     Start date: 1966     Last attempt to quit: 1992     Years since quittin.9     Smokeless tobacco: Never Used   Substance and Sexual Activity     Alcohol use: No  "    Drug use: No     Sexual activity: Yes     Partners: Male   Lifestyle     Physical activity:     Days per week: Not on file     Minutes per session: Not on file     Stress: Not on file   Relationships     Social connections:     Talks on phone: Not on file     Gets together: Not on file     Attends Yazidism service: Not on file     Active member of club or organization: Not on file     Attends meetings of clubs or organizations: Not on file     Relationship status: Not on file     Intimate partner violence:     Fear of current or ex partner: Not on file     Emotionally abused: Not on file     Physically abused: Not on file     Forced sexual activity: Not on file   Other Topics Concern     Parent/sibling w/ CABG, MI or angioplasty before 65F 55M? No      Service Not Asked     Blood Transfusions Not Asked     Caffeine Concern No     Occupational Exposure Not Asked     Hobby Hazards Not Asked     Sleep Concern No     Stress Concern Not Asked     Weight Concern Not Asked     Special Diet No     Back Care Not Asked     Exercise Yes     Comment: running or walking      Bike Helmet Not Asked     Seat Belt No     Self-Exams Not Asked   Social History Narrative    --------------------------------------------------------------------------------     ROS:  Answers for HPI/ROS submitted by the patient on 11/2/2019   General Symptoms: No  Skin Symptoms: No  HENT Symptoms: Yes  EYE SYMPTOMS: Yes  HEART SYMPTOMS: Yes  LUNG SYMPTOMS: No  INTESTINAL SYMPTOMS: No  URINARY SYMPTOMS: Yes  REPRODUCTIVE SYMPTOMS: No  SKELETAL SYMPTOMS: No  BLOOD SYMPTOMS: No  NERVOUS SYSTEM SYMPTOMS: No  MENTAL HEALTH SYMPTOMS: No  Tinnitus: Yes  Floaters: Yes  Pain in legs with walking: Yes  Trouble holding urine or incontinence: Yes    VITAL SIGNS:  /84 (BP Location: Right arm, Patient Position: Chair, Cuff Size: Adult Large)   Pulse 80   Ht 1.715 m (5' 7.5\")   Wt 93.4 kg (206 lb)   SpO2 96%   BMI 31.79 kg/m    Body mass index is " 31.79 kg/m .  Wt Readings from Last 2 Encounters:   11/01/19 93.9 kg (207 lb)   10/21/19 94.1 kg (207 lb 8 oz)       PHYSICAL EXAM  Jonathon Guerra is a 74 year old male.in no acute distress.  HEENT: Eyes Nonicteric.  Neck: JVP 2cm H20.  Carotids +2 bilaterally without bruits.  Lungs: CTA.  Cor: RRR. Normal S1 and S2.  No murmur, rub, or gallop.  PMI in Lf 5th ICS.  Abd: Soft, nontender, nondistended.  NABS.  No pulsatile mass.  Extremities: No C/C/E.  Pulses +3/3 symmetric in upper and lower extremities.  Neuro: Grossly intact.  Psych: A&O x 3.  Skin: No rash.    LABS  Recent Labs   Lab Test 10/28/14  1511 11/30/12  0831   WBC 8.0 7.4   HGB 14.6 14.9   HCT 43.6 43.7    216     Recent Labs   Lab Test 10/21/19  1054 10/16/18  1455    141   POTASSIUM 4.8 4.0   CHLORIDE 107 106   CO2 25 29   GLC 75 89   BUN 25 19   CR 0.77 0.71   MARTELL 8.8 9.1     Recent Labs   Lab Test 11/05/19  0720 10/30/18  0818  05/12/15  0753 06/11/14  0900   CHOL 135 111   < > 149 148   HDL 60 42   < > 53 62   LDL 49 55   < > 80 73   TRIG 130 71   < > 81 64   CHOLHDLRATIO  --   --   --  2.8 2.4   NHDL 75 69   < >  --   --     < > = values in this interval not displayed.        EKG:  10/28/13  NSR. No evidence of ischemia    ECHO 10/30/18:  Interpretation Summary  1. Above average exercise capacity, target HR achieved.  2. The patient exhibited no chest pain during exercise.  3. This was a normal stress EKG with no evidence of stress-induced ischemia.  4. Rest echo: Normal left ventricular function and wall motion at rest. The  visual ejection fraction is estimated at 55-60%.  5. Stress echo: This was a normal stress echocardiogram with no evidence of  stress-induced ischemia. The visual ejection fraction is estimated at 65-70%.     There is moderate (2+) tricuspid regurgitation. The right ventricular systolic  pressure is approximated at 32mmHg plus the right atrial pressure.     Stress echo from 8/2017 was normal, there was 1-2+  TR, RVSP 25mmHg.    Stress Echo 8/17/17  Interpretation Summary  This was a normal stress echocardiogram with no evidence of stress-induced  ischemia.  The patient exercised 10:50 minutes and exhibited no chest pain during  exercise. There was a maximum 1.0mm ST segment depression in the lateral lead  (s).  The post-stress imaging was performed at low heart rates as the heart rate  fell rapidly posst-exercise decreasing the sensitivity for ischemia. The LV  size decreases minimally post-exercise despite an increase in LV function -  direct comparison to the 2 prior stress echo's indicates a similar appearance.    Stress Echo 6/23/16  Interpretation Summary     This was a normal stress echocardiogram with no evidence of stress-induced  ischemia at a high workload.  There is moderate (2+) tricuspid regurgitation, an increase from prior echo  Reports.    Stress Echo 5/12/15  Interpretation Summary  THIS IS A NORMAL STRESS ECHO AND STRESS EKG.  There is no comparison study available.  Definity contrast was used without apparent complications.  The results of the stress echocardiogram were conveyed to the patient today.  There is mild to moderate (1-2+) tricuspid regurgitation. The right   ventricular systolic pressure is approximated at 33mmHg plus the right atrial   pressure. Right ventricular systolic pressure is elevated, consistent with   mild pulmonary hypertension.  PatientHeight: 67 in  PatientWeight: 202 lbs  SystolicPressure: 98 mmHg  DiastolicPressure: 72 mmHg  HeartRate: 62 bpm  BSA 2.0 m^2     NM Exercise Stress Test 4/25/14  Impression  1.  Myocardial perfusion imaging using single isotope technique  demonstrated very small areas of ischemia at the lateral base and  inferolateral apex.   2. Gated images demonstrated normal wall motion thickening.  The left  ventricular systolic function is 56% at rest, 55% post stress.  3. Compared to the prior study from 11/12/2009, perfusion is normal as  well  .    ASSESSMENT AND PLAN:   Mr. Jonathon Guerra is a 73 yo gentleman with a history of mild to moderate nonocclusive CAD without symptoms of angina. He has had multiple stress tests since his CT findings of CAD, which have all been largely negative. Given his absence of symptoms, I do not feel that repeat stress testing is indicated at this time and provided reassurance. His risk factors, including HLD and tobacco use have been adequately addressed and continuing a healthy lifestyle was actively enforced.    He has been diagnosed with paroxysmal AF and has responded well to sotalol. He denies any episodes of palpations and subsequent cardiac monitors have not shown recurrence. However, given CHADS2-VASC score of 2 (age, vascular disease), I would recommend oral anticoagulation. I discussed starting a NoAC such as apixaban, however patient continues to resist and would rather continue full-strength aspirin.    1. Non-obstructive CAD  - Continue high intensity statin  - continue to encourage healthy lifestyle modifications, including regular exercise  - given absence of symptoms, routine stress testing is not advised, reassurance given    2. pAF  - Per guidelines, [id number] score of 2, he should be offered oral anticoagulation  - I prefer starting a NoAC, such as apixaban, however patient is resistant to this despite explaining increased stroke risk, even while on sotalol therapy  - Continue ASA 325mg daily    FOLLOW UP:  1 year    Stephon Hay MD  Cardiovascular Fellow      ATTENDING NOTE:  Patient has been seen and evaluated by me on 11/05/2019. I have reviewed the documentation above.  I have reviewed today's vital signs, medications, labs, and imaging results.  I have reviewed and edited, as necessary, the history, review of systems, physical examination, and assessment and plan.  I have discussed my assessment and plan with the cardiology fellow.  Jonathon Guerra is a 74 year old male with risk factor profile  (-) HTN, (-) DM, (+) hypercholesterolemia, (+)  prior tobacco use, (-) fam Hx premature CAD, coronary CTA (2006) showing moderate obstructive CAD, treated with ASA + statin, followed by annual stress test, negative stress ECHO (2018), returns for routine follow up.  No symptoms.  Exam documented above.  He has PAF with CHADS2-Vasc = 2 (if documented CAD is given a point for vascular disease) but prefers not to be anticoagulated.      ABDOMINAL US (11/7/19): No AAA.    Recent Labs   Lab Test 10/30/20  0905 11/05/19  0720 05/12/15  0753 05/12/15  0753 06/11/14  0900   CHOL 118 135   < > 149 148   HDL 59 60   < > 53 62   LDL 49 49   < > 80 73   TRIG 52 130   < > 81 64   CHOLHDLRATIO  --   --   --  2.8 2.4    < > = values in this interval not displayed.       Chris Aguilera MD     Cardiovascular Division    CC  Patient Care Team:  Perez Beckett MD as PCP - General (Family Practice)  Perez Beckett MD as Assigned PCP  GEORGE GUERRERO

## 2019-11-05 NOTE — LETTER
11/5/2019      RE: Jonathon Guerra  [address]   [facility] [address]       Dear Colleague,    Thank you for the opportunity to participate in the care of your patient, Jonathon Guerra, at the [facility]. Please see a copy of my visit note below.    CARDIOLOGY CONSULTATION    Referring Provider:  Jorge Luis Westbrook  Primary Care Provider:   Perez Beckett  Indication for Consultation:  CAD, pAF, establish care    HPI: Jonathon Guerra is a 74 year old male being seen today for evaluation of CAD and pAF.   The patient's risk factor profile is: (-) HTN, (-) DM, (+) hypercholesterolemia, (+)  prior tobacco use, (-) fam Hx premature CAD.    In 2006, Mr. Guerra had a coronary CT (unclear reason why) which demonstrated mild-moderate diffuse CAD which was nonocclusive. He was asymptomatic at this time and started on goal directed medical therapy. He has been a patient of Dr. Westbrook but is looking to establish care closer to home. He has had multiple stress tests since this time, which have shown above-average exercise tolerance and have been negative for ischemia. In 2009, he developed atrial fibrillation. He had multiple failed cardioversions. He was started on sotalol and his AF has been under good control since. He was recommended to start anticoagulation, however has been resistant to this and has been on 325mg of aspirin as protection. His HLD has been managed with statin therapy and most recently with LDL 49, HDL 60, and .    Mr. Guerra states he feels well. He denies any symptoms of angina, palpitations. Denies SOB, orthopnea, PND. He recently developed extensor fascitiis, so does not exercise as much in the past couple of weeks, but previously very active. He is compliant with medications and has no side-effects.    [facility] [facility] [place] He is a [place] . Former smoker, but quit >20 years  ago.        PAST MEDICAL HISTORY:  Past Medical History:   Diagnosis Date     Atrial fibrillation (H)      Coronary artery disease      Hyperlipidemia      Hypertension      Thyroid disease        CURRENT MEDICATIONS:  Current Outpatient Medications   Medication Sig     Acetaminophen 650 MG TABS Take by mouth daily     aspirin 325 MG tablet Take 325 mg by mouth daily.     cholecalciferol (VITAMIN D3) 1000 UNIT tablet Take 1 tablet by mouth daily      diclofenac (VOLTAREN) 1 % topical gel Apply 2 g topically 4 times daily     Diphenhydramine-APAP, sleep, (ACETAMINOPHEN PM PO) Take 500 mg by mouth     levothyroxine (SYNTHROID/LEVOTHROID) 88 MCG tablet Take 1 tablet (88 mcg) by mouth daily     MELATONIN PO Take 10 mg by mouth At Bedtime      metoprolol succinate ER (TOPROL-XL) 25 MG 24 hr tablet Take 0.5 tablets (12.5 mg) by mouth daily 1/2 tablet daily     Multiple Vitamin (MULTI-VITAMIN PO) Take by mouth daily      Nitroglycerin (NITROSTAT SL) Place 0.4 mg under the tongue.     rosuvastatin (CRESTOR) 40 MG tablet Take 1 tablet (40 mg) by mouth daily     sotalol (BETAPACE) 80 MG tablet TAKE 1 TABLET BY MOUTH 2 TIMES A DAY.     No current facility-administered medications for this visit.        PAST SURGICAL HISTORY:  Past Surgical History:   Procedure Laterality Date     HERNIA REPAIR, INGUINAL RT/LT       OTHER SURGICAL HISTORY      upper and lower partial plate       ALLERGIES  Atorvastatin    FAMILY HX:  Family History   Problem Relation Age of Onset     Cancer Father         prostate     Cancer Brother      Other - See Comments Mother         old age       SOCIAL HX:  Social History     Socioeconomic History     Marital status: Single     Spouse name: Not on file     Number of children: Not on file     Years of education: Not on file     Highest education level: Not on file   Occupational History     Not on file   Social Needs     Financial resource strain: Not on file     Food insecurity:     Worry: Not on file      Inability: Not on file     Transportation needs:     Medical: Not on file     Non-medical: Not on file   Tobacco Use     Smoking status: Former Smoker     Start date: 1966     Last attempt to quit: 1992     Years since quittin.9     Smokeless tobacco: Never Used   Substance and Sexual Activity     Alcohol use: No     Drug use: No     Sexual activity: Yes     Partners: Male   Lifestyle     Physical activity:     Days per week: Not on file     Minutes per session: Not on file     Stress: Not on file   Relationships     Social connections:     Talks on phone: Not on file     Gets together: Not on file     Attends Christianity service: Not on file     Active member of club or organization: Not on file     Attends meetings of clubs or organizations: Not on file     Relationship status: Not on file     Intimate partner violence:     Fear of current or ex partner: Not on file     Emotionally abused: Not on file     Physically abused: Not on file     Forced sexual activity: Not on file   Other Topics Concern     Parent/sibling w/ CABG, MI or angioplasty before 65F 55M? No      Service Not Asked     Blood Transfusions Not Asked     Caffeine Concern No     Occupational Exposure Not Asked     Hobby Hazards Not Asked     Sleep Concern No     Stress Concern Not Asked     Weight Concern Not Asked     Special Diet No     Back Care Not Asked     Exercise Yes     Comment: running or walking      Bike Helmet Not Asked     Seat Belt No     Self-Exams Not Asked   Social History Narrative    --------------------------------------------------------------------------------     ROS:  Answers for HPI/ROS submitted by the patient on 2019   General Symptoms: No  Skin Symptoms: No  HENT Symptoms: Yes  EYE SYMPTOMS: Yes  HEART SYMPTOMS: Yes  LUNG SYMPTOMS: No  INTESTINAL SYMPTOMS: No  URINARY SYMPTOMS: Yes  REPRODUCTIVE SYMPTOMS: No  SKELETAL SYMPTOMS: No  BLOOD SYMPTOMS: No  NERVOUS SYSTEM SYMPTOMS: No  MENTAL  "HEALTH SYMPTOMS: No  Tinnitus: Yes  Floaters: Yes  Pain in legs with walking: Yes  Trouble holding urine or incontinence: Yes    VITAL SIGNS:  /84 (BP Location: Right arm, Patient Position: Chair, Cuff Size: Adult Large)   Pulse 80   Ht 1.715 m (5' 7.5\")   Wt 93.4 kg (206 lb)   SpO2 96%   BMI 31.79 kg/m     Body mass index is 31.79 kg/m .  Wt Readings from Last 2 Encounters:   11/01/19 93.9 kg (207 lb)   10/21/19 94.1 kg (207 lb 8 oz)       PHYSICAL EXAM  Jonathon Guerra is a 74 year old male.in no acute distress.  HEENT: Eyes Nonicteric.  Neck: JVP 2cm H20.  Carotids +2 bilaterally without bruits.  Lungs: CTA.  Cor: RRR. Normal S1 and S2.  No murmur, rub, or gallop.  PMI in Lf 5th ICS.  Abd: Soft, nontender, nondistended.  NABS.  No pulsatile mass.  Extremities: No C/C/E.  Pulses +3/3 symmetric in upper and lower extremities.  Neuro: Grossly intact.  Psych: A&O x 3.  Skin: No rash.    LABS  Recent Labs   Lab Test 10/28/14  1511 11/30/12  0831   WBC 8.0 7.4   HGB 14.6 14.9   HCT 43.6 43.7    216     Recent Labs   Lab Test 10/21/19  1054 10/16/18  1455    141   POTASSIUM 4.8 4.0   CHLORIDE 107 106   CO2 25 29   GLC 75 89   BUN 25 19   CR 0.77 0.71   MARTELL 8.8 9.1     Recent Labs   Lab Test 11/05/19  0720 10/30/18  0818  05/12/15  0753 06/11/14  0900   CHOL 135 111   < > 149 148   HDL 60 42   < > 53 62   LDL 49 55   < > 80 73   TRIG 130 71   < > 81 64   CHOLHDLRATIO  --   --   --  2.8 2.4   NHDL 75 69   < >  --   --     < > = values in this interval not displayed.        EKG:  10/28/13  NSR. No evidence of ischemia    ECHO 10/30/18:  Interpretation Summary  1. Above average exercise capacity, target HR achieved.  2. The patient exhibited no chest pain during exercise.  3. This was a normal stress EKG with no evidence of stress-induced ischemia.  4. Rest echo: Normal left ventricular function and wall motion at rest. The  visual ejection fraction is estimated at 55-60%.  5. Stress echo: This was " a normal stress echocardiogram with no evidence of  stress-induced ischemia. The visual ejection fraction is estimated at 65-70%.     There is moderate (2+) tricuspid regurgitation. The right ventricular systolic  pressure is approximated at 32mmHg plus the right atrial pressure.     Stress echo from 8/2017 was normal, there was 1-2+ TR, RVSP 25mmHg.    Stress Echo 8/17/17  Interpretation Summary  This was a normal stress echocardiogram with no evidence of stress-induced  ischemia.  The patient exercised 10:50 minutes and exhibited no chest pain during  exercise. There was a maximum 1.0mm ST segment depression in the lateral lead  (s).  The post-stress imaging was performed at low heart rates as the heart rate  fell rapidly posst-exercise decreasing the sensitivity for ischemia. The LV  size decreases minimally post-exercise despite an increase in LV function -  direct comparison to the 2 prior stress echo's indicates a similar appearance.    Stress Echo 6/23/16  Interpretation Summary     This was a normal stress echocardiogram with no evidence of stress-induced  ischemia at a high workload.  There is moderate (2+) tricuspid regurgitation, an increase from prior echo  Reports.    Stress Echo 5/12/15  Interpretation Summary  THIS IS A NORMAL STRESS ECHO AND STRESS EKG.  There is no comparison study available.  Definity contrast was used without apparent complications.  The results of the stress echocardiogram were conveyed to the patient today.  There is mild to moderate (1-2+) tricuspid regurgitation. The right   ventricular systolic pressure is approximated at 33mmHg plus the right atrial   pressure. Right ventricular systolic pressure is elevated, consistent with   mild pulmonary hypertension.  PatientHeight: 67 in  PatientWeight: 202 lbs  SystolicPressure: 98 mmHg  DiastolicPressure: 72 mmHg  HeartRate: 62 bpm  BSA 2.0 m^2     NM Exercise Stress Test 4/25/14  Impression  1.  Myocardial perfusion imaging using  single isotope technique  demonstrated very small areas of ischemia at the lateral base and  inferolateral apex.   2. Gated images demonstrated normal wall motion thickening.  The left  ventricular systolic function is 56% at rest, 55% post stress.  3. Compared to the prior study from 11/12/2009, perfusion is normal as  well .    ASSESSMENT AND PLAN:   Mr. Jonathon Guerra is a 75 yo gentleman with a history of mild to moderate nonocclusive CAD without symptoms of angina. He has had multiple stress tests since his CT findings of CAD, which have all been largely negative. Given his absence of symptoms, I do not feel that repeat stress testing is indicated at this time and provided reassurance. His risk factors, including HLD and tobacco use have been adequately addressed and continuing a healthy lifestyle was actively enforced.    He has been diagnosed with paroxysmal AF and has responded well to sotalol. He denies any episodes of palpations and subsequent cardiac monitors have not shown recurrence. However, given CHADS2-VASC score of 2 (age, vascular disease), I would recommend oral anticoagulation. I discussed starting a NoAC such as apixaban, however patient continues to resist and would rather continue full-strength aspirin.    1. Non-obstructive CAD  - Continue high intensity statin  - continue to encourage healthy lifestyle modifications, including regular exercise  - given absence of symptoms, routine stress testing is not advised, reassurance given    2. pAF  - Per guidelines, [id number] score of 2, he should be offered oral anticoagulation  - I prefer starting a NoAC, such as apixaban, however patient is resistant to this despite explaining increased stroke risk, even while on sotalol therapy  - Continue ASA 325mg daily    FOLLOW UP:  1 year    Stephon Hay MD  Cardiovascular Fellow      ATTENDING NOTE:  Patient has been seen and evaluated by me on 11/05/2019. I have reviewed the documentation above.  I have  reviewed today's vital signs, medications, labs, and imaging results.  I have reviewed and edited, as necessary, the history, review of systems, physical examination, and assessment and plan.  I have discussed my assessment and plan with the cardiology fellow.  Jonathon Guerra is a 74 year old male with risk factor profile (-) HTN, (-) DM, (+) hypercholesterolemia, (+)  prior tobacco use, (-) fam Hx premature CAD, coronary CTA (2006) showing moderate obstructive CAD, treated with ASA + statin, followed by annual stress test, negative stress ECHO (2018), returns for routine follow up.  No symptoms.  Exam documented above.  He has PAF with CHADS2-Vasc = 2 (if documented CAD is given a point for vascular disease) but prefers not to be anticoagulated.        Chris Aguilera MD     Cardiovascular Division    CC  Patient Care Team:  Perez Beckett MD as PCP - General (Family Practice)  Perez Beckett MD as Assigned PCP  GEORGE GUERRERO

## 2019-11-05 NOTE — NURSING NOTE
Chief Complaint   Patient presents with     Follow Up     self referred for CAD, atrial fibrillation, hyperlipidemia, hypertension     Vitals were taken and medications reconciled.     Jerald Ortiz CMA  3:57 PM

## 2019-11-05 NOTE — NURSING NOTE
Cardiac Testing: Patient given instructions regarding abdominal ultrasound. Discussed purpose, preparation, procedure and when to expect results reported back to the patient. Patient demonstrated understanding of this information and agreed to call with further questions or concerns.  Med Reconcile: Reviewed and verified all current medications with the patient. The updated medication list was printed and given to the patient.  Return Appointment: Patient given instructions regarding scheduling next clinic visit. Patient demonstrated understanding of this information and agreed to call with further questions or concerns.  Patient stated he understood all health information given and agreed to call with further questions or concerns.

## 2019-11-05 NOTE — PATIENT INSTRUCTIONS
It was a pleasure to see you in the cardiology clinic today.    If you have any questions, you can reach my nurse, Loree Nelson, at [phone].  Press Option #1 for the [facility], and then press Option #f 4 or nursing.    Note the new medications: None    Stop the following medications: None    The results from today include: None    Tests ordered today: Abdominal ultrasound to rule out AAA    I would like you to follow up with your primary care provider as needed.    Sincerely,      Chris Aguilera MD     [facility]

## 2019-11-08 ENCOUNTER — ANCILLARY PROCEDURE (OUTPATIENT)
Dept: ULTRASOUND IMAGING | Facility: CLINIC | Age: 74
End: 2019-11-08
Attending: INTERNAL MEDICINE
Payer: COMMERCIAL

## 2019-11-08 DIAGNOSIS — I71.40 ABDOMINAL AORTIC ANEURYSM (AAA) WITHOUT RUPTURE (H): ICD-10-CM

## 2019-12-16 ENCOUNTER — HEALTH MAINTENANCE LETTER (OUTPATIENT)
Age: 74
End: 2019-12-16

## 2020-01-01 ASSESSMENT — ACTIVITIES OF DAILY LIVING (ADL): CURRENT_FUNCTION: NO ASSISTANCE NEEDED

## 2020-01-02 RX ORDER — PHENOL 1.4 %
10 AEROSOL, SPRAY (ML) MUCOUS MEMBRANE
COMMUNITY

## 2020-01-02 RX ORDER — ATORVASTATIN CALCIUM 40 MG/1
40 TABLET, FILM COATED ORAL
COMMUNITY
End: 2020-01-03

## 2020-01-02 RX ORDER — TRIAMCINOLONE ACETONIDE 1 MG/G
CREAM TOPICAL
Refills: 1 | COMMUNITY
Start: 2019-04-25 | End: 2020-07-01

## 2020-01-02 RX ORDER — MAG HYDROX/ALUMINUM HYD/SIMETH 400-400-40
SUSPENSION, ORAL (FINAL DOSE FORM) ORAL
COMMUNITY

## 2020-01-02 RX ORDER — SIMVASTATIN 20 MG
20 TABLET ORAL
COMMUNITY
End: 2020-01-03

## 2020-01-03 ENCOUNTER — OFFICE VISIT (OUTPATIENT)
Dept: FAMILY MEDICINE | Facility: CLINIC | Age: 75
End: 2020-01-03
Payer: COMMERCIAL

## 2020-01-03 VITALS
TEMPERATURE: 97.6 F | BODY MASS INDEX: 32.28 KG/M2 | OXYGEN SATURATION: 95 % | SYSTOLIC BLOOD PRESSURE: 116 MMHG | DIASTOLIC BLOOD PRESSURE: 78 MMHG | WEIGHT: 213 LBS | RESPIRATION RATE: 18 BRPM | HEIGHT: 68 IN | HEART RATE: 59 BPM

## 2020-01-03 DIAGNOSIS — E78.00 PURE HYPERCHOLESTEROLEMIA: ICD-10-CM

## 2020-01-03 DIAGNOSIS — G47.33 OSA (OBSTRUCTIVE SLEEP APNEA): Chronic | ICD-10-CM

## 2020-01-03 DIAGNOSIS — Z00.00 ROUTINE HISTORY AND PHYSICAL EXAMINATION OF ADULT: Primary | ICD-10-CM

## 2020-01-03 DIAGNOSIS — I25.10 CORONARY ARTERY DISEASE INVOLVING NATIVE CORONARY ARTERY OF NATIVE HEART WITHOUT ANGINA PECTORIS: ICD-10-CM

## 2020-01-03 DIAGNOSIS — Z12.5 SCREENING PSA (PROSTATE SPECIFIC ANTIGEN): ICD-10-CM

## 2020-01-03 DIAGNOSIS — H81.11 BENIGN PAROXYSMAL POSITIONAL VERTIGO, RIGHT: ICD-10-CM

## 2020-01-03 DIAGNOSIS — E03.9 ACQUIRED HYPOTHYROIDISM: ICD-10-CM

## 2020-01-03 DIAGNOSIS — I48.20 CHRONIC ATRIAL FIBRILLATION (H): ICD-10-CM

## 2020-01-03 DIAGNOSIS — N52.9 IMPOTENCE OF ORGANIC ORIGIN: ICD-10-CM

## 2020-01-03 DIAGNOSIS — I71.40 ABDOMINAL AORTIC ANEURYSM (AAA) WITHOUT RUPTURE (H): ICD-10-CM

## 2020-01-03 LAB — PSA SERPL-ACNC: 2.94 UG/L (ref 0–4)

## 2020-01-03 PROCEDURE — G0103 PSA SCREENING: HCPCS | Performed by: FAMILY MEDICINE

## 2020-01-03 PROCEDURE — 99397 PER PM REEVAL EST PAT 65+ YR: CPT | Performed by: FAMILY MEDICINE

## 2020-01-03 PROCEDURE — 36415 COLL VENOUS BLD VENIPUNCTURE: CPT | Performed by: FAMILY MEDICINE

## 2020-01-03 ASSESSMENT — MIFFLIN-ST. JEOR: SCORE: 1682.72

## 2020-01-03 ASSESSMENT — ACTIVITIES OF DAILY LIVING (ADL): CURRENT_FUNCTION: NO ASSISTANCE NEEDED

## 2020-01-03 NOTE — LETTER
"      [address]  [address]  [facility] [id number] [phone]                                                                                                           Jonathon Ly Guerra  [address]   [facility] [address]    January 3, 2020      Dear Jonathon,    Diabetes: Exchange List  What are the exchange lists?   The exchange lists show you portions of food that equal 1 exchange. Foods are divided into food lists. The foods on each list are called exchanges because they have a similar number of calories, protein, carbohydrate and fat content. Foods from each list can be traded or \"exchanged\" for any other food on the same list because they all have a similar exchange value. A dietitian will help you plan how much food your child should eat at each meal and from what lists the foods should come from. At first you should measure your food until you are able to make good estimates about serving sizes. The following list is a sample of foods found on the exchange lists. For more information, you can buy the Exchange Lists for Meal Planning from: The American Diabetes Association [address] [place] [address] 1-182.190.2387 http://www.diabetes.org  Carbohydrate group   Starch List: One starch exchange contains about 15 grams of carbohydrate, 3 grams of protein, 0 to 1 grams of fat, and 80 calories. A starch exchange is sometimes called a carb exchange. Examples of one starch (carb) exchange are:   one slice of bread   1/2 hamburger or hot dog bun   3/4 cup of unsweetened cereal   1/3 cup pasta   3 cups popcorn   crackers (6 small saltines, 3 squares of belle crackers, 3 of most other crackers)   1 pancake or waffle (5 inch)   15 to 20 fat-free or baked potato or corn chips.   The vegetables included in the starch exchanges include:   corn (1/2 cup or 1/2 cob)   white potato (1/4 large baked with skin or 1/2 cup mashed)   yam or sweet " "potato (1/2 cup)   green peas (1/2 cup)   squash, winter (1 cup)   lima beans (2/3 cup).   Fruit List: 1 fruit exchange contains about 15 grams of carbohydrate and 60 calories. Examples of one fruit exchange are:   grape juice (1/3 cup)   apple or pineapple juice (1/2 cup)   orange or grapefruit juice (1/2 cup)   1 small apple   orange or peach   1/2 banana   1 cup raspberries   1/3 of a small cantaloupe   1 slice of watermelon.   Milk List: 1 milk exchange contains about 8 grams of protein and 12 grams of carbohydrate. Items on the milk list are divided into fat-free, reduced fat, and whole milk depending on the number of fat grams in the exchange. Examples of one milk exchange are: Fat-Free (0 to 3 grams of fat)   1 cup of skim or non-fat milk   1 cup of 1% milk (also includes 1/2 fat exchange)   6 ounces flavored fat-free yogurt   Reduced-Fat (5 grams of fat)   6 ounces of plain, low-fat yogurt   1 cup 2% milk (also includes one fat exchange).   Whole Milk (8 grams of fat)   8 ounces of plain yogurt (made from whole milk)   1 cup whole milk.   Vegetable List: One vegetable exchange has 5 grams of carbohydrate, 2 grams of protein, no fat, and 25 calories. One-half cup of cooked or a cup of raw vegetables is a good measure for 1 exchange of most vegetables. Raw lettuce may be taken in larger quantities, but salad dressing usually equals 1 fat exchange. Other Carbohydrates List: One \"other carbohydrate\" exchange has 15 grams of carbohydrate. Many of these foods count as a starch exchange and one or more fat exchanges.   brownie (2 inch square) = 1 carb, 1 fat exchange   fruit snack roll = 1 carb exchange   granola bar = 1 and 1/2 carb exchanges   ice cream (1/2 cup) = 1 carb, 2 fat exchanges   frozen yogurt (1/2 cup) = 1 carb, 0 to 1 fat exchanges   tortilla chips (6-12) = 1 carb, 2 fat exchanges.   Meat and Meat Substitute Group   Meats are divided into very lean meats, lean meats, medium-fat meats, and high-fat " meats. People with diabetes should try to eat more lean and medium fat meats and stay away from the high fat choices. The Very Lean meat group includes foods that contain 7 grams of protein, 0 to 1 gram of fat, and 35 calories for 1 exchange. Examples include:   1 ounce chicken or turkey (white meat, no skin)   1 ounce fresh fish   1 ounce fat-free cheese   2 egg whites   The Lean meat group includes foods that contain 7 grams of protein, 3 grams of fat, and 55 calories for 1 meat exchange. Examples include:   1 ounce chicken or turkey (dark meat, no skin)   1 ounce fish   1 ounce lean pork   1 ounce USDA Select or Choice grades of lean beef   1 ounce cheese (with 3 grams of fat or less per ounce).   The Medium-Fat group includes foods that have 7 grams of protein, 5 grams of fat, and 75 calories for 1 meat exchange. Examples include:   1 ounce of ground beef, most cuts of beef, pork, lamb or veal   1 ounce of cheese (5 grams of fat per ounce or less)   1 egg   1 ounce fried fish.   The High-Fat foods have 7 grams of protein, 8 grams of fat, and 100 calories for 1 meat exchange. This group includes:   1 ounce of pork sausage   1 ounce of spare ribs   1 oz of regular cheese (American, Swiss etc.)   1 oz of lunch meat   1 hot dog (turkey or chicken).   Fat Group   Fat List: Fat is necessary for the body and is particularly important during periods of fasting (overnight), when it is very slowly absorbed. 1 fat exchange contains 5 grams of fat and 45 calories. The monounsaturated fats and polyunsaturated fats are better for us than saturated fats. The fat list includes: 1 exchange of monounsaturated fats equals:   1/2 Tbsp peanut butter   6 almonds   1 tsp of oil (olive, peanut, canola).   1 exchange of polyunsaturated fats equals:   1 tsp margarine   1 tsp of any vegetable oil (except coconut).   1 exchange of saturated fat includes:   1 tsp butter   1 strip of dove   2 Tbsp of cream (half and half).   Free Foods   A  free food contains less than 20 calories or less than 5 grams of carbohydrate per serving. If the food has a serving size listed on its package, it should be limited to 3 servings spread throughout the day. Examples of free foods include:   4 Tbsp fat-free margarine   1 Tbsp fat-free Miracle Whip   sugar-free gelatin   diet soft drinks   catsup   soy sauce   spices.   Combination Foods   Many foods, such as casseroles, are mixed together. Your dietitian can help you figure out how many exchanges to count for combination foods. For example:   lasagna (1 cup) = 2 carb exchanges and 2 medium-fat meat exchanges   spaghetti with meatballs (1 cup) = 2 carb exchanges and 2 medium-fat meat exchanges   pizza, cheese (1/4 of 12 in.) = 2 carbs, 2 medium-fat meats   chicken noodle soup (1 cup) = 1 carb exchange   frozen entrée (less than 300 calories) = 2 carbs, 3 lean meat exchanges   macaroni and cheese (1 cup) = 2 carb exchanges and 2 medium-fat meat exchanges.   22-24 exchanges      Thank you for choosing [facility].  We appreciate the opportunity to serve you and look forward to supporting your healthcare needs in the future.    If you have any questions or concerns, please call me or my staff at [phone].      Sincerely,    Perez Beckett Jr MD

## 2020-01-03 NOTE — LETTER
January 6, 2020      Jonathon Guerra  [address]   [facility] [address]        Dear ,    We are writing to inform you of your test results.    Your test results fall within the expected range(s) or remain unchanged from previous results.  Please continue with current treatment plan.    Resulted Orders   Prostate spec antigen screen   Result Value Ref Range    PSA 2.94 0 - 4 ug/L      Comment:      Assay Method:  Chemiluminescence using Siemens Vista analyzer       If you have any questions or concerns, please call the clinic at the number listed above.       Sincerely,        RINKU TONEY MD

## 2020-01-03 NOTE — PATIENT INSTRUCTIONS
"  5 -10MINUTES WALKING WALK IN PLACE , DANCING OR AEROBIC EXERCISE THREE TIMES DAILY     REDUCES RISK OF DYING 50%  ,THAT IS 25 MINUTES PER DAY     BALANCE WITH KNEE RAISED TO PREVENT FALLS INITIALLY EYES OPEN ON ONE FOOT 6    PREVENTS FALLS 40%    STRENGTHENING UPPER EXTREMETIES WITH 80% MAXIMAL LIFT STRENGTHENS LUMBAR AND THORACIC SPINE    YOU CAN DO THE SAME THING WITH 1 POUND WEIGHTS  100 EACH POSTION X 5     RINKU TONEY JR., MD       POMEGRANATE JUICE INFLAMMATION  COLLAGEN TYPE 2  INTERNET PHARMACY  CHICKEN BONE BROTH 1-2 OUNCES DAILY  FISH OIL ONE DAILY   PAPAYA  AND PINEAPPLE SCAR HEALING   BLUEBERRIES FOR INFLAMMATION  SPINACH FOR INFLAMMATION   BOSWELLIA NIALL  CURCUMIN   PINE BARK EXTRACT    AVOID SUGARS   AVOID NIGHT SHADES   POTATOES   PEPPERS   TOMATOES  \"LEAKY GUT SYNDROME\"    RINKU TONEY JR., MD          .(Z12.5) Screening PSA (prostate specific antigen)  (primary encounter diagnosis)    Comment:      Plan: Prostate spec antigen screen             RINKU TONEY JR., MD              "

## 2020-01-03 NOTE — PROGRESS NOTES
"SUBJECTIVE:   Jonathon Guerra is a 74 year old male who presents for Preventive Visit.  Are you in the first 12 months of your Medicare coverage?  No    Healthy Habits:     In general, how would you rate your overall health?  Good    Frequency of exercise:  2-3 days/week    Duration of exercise:  30-45 minutes    Do you usually eat at least 4 servings of fruit and vegetables a day, include whole grains    & fiber and avoid regularly eating high fat or \"junk\" foods?  Yes    Taking medications regularly:  Yes    Medication side effects:  None    Ability to successfully perform activities of daily living:  No assistance needed    Home Safety:  No safety concerns identified    Hearing Impairment:  No hearing concerns    In the past 6 months, have you been bothered by leaking of urine? Yes    In general, how would you rate your overall mental or emotional health?  Good      PHQ-2 Total Score: 0    Additional concerns today:  No    Do you feel safe in your environment? Yes    Have you ever done Advance Care Planning? (For example, a Health Directive, POLST, or a discussion with a medical provider or your loved ones about your wishes): Yes, advance care planning is on file.      Fall risk  Fallen 2 or more times in the past year?: No  Any fall with injury in the past year?: No    Cognitive Screening   1) Repeat 3 items (Leader, Season, Table)    2) Clock draw: NORMAL  3) 3 item recall: Recalls 3 objects  Results: 3 items recalled: COGNITIVE IMPAIRMENT LESS LIKELY    Mini-CogTM Copyright NEISHA Urbina. Licensed by the author for use in [facility]; reprinted with permission ([email or web address]). All rights reserved.      Do you have sleep apnea, excessive snoring or daytime drowsiness?: no    Reviewed and updated as needed this visit by clinical staff  Tobacco  Allergies  Meds  Problems  Med Hx  Surg Hx  Fam Hx  Soc Hx          Reviewed and updated as needed this visit by Provider  Allergies  Problems      "   Social History     Tobacco Use     Smoking status: Former Smoker     Start date: 1966     Last attempt to quit: 1992     Years since quittin.1     Smokeless tobacco: Never Used   Substance Use Topics     Alcohol use: No         Alcohol Use 2020   Prescreen: >3 drinks/day or >7 drinks/week? Not Applicable   Prescreen: >3 drinks/day or >7 drinks/week? -   X      Current providers sharing in care for this patient include:   Patient Care Team:  Perez Beckett MD as PCP - General (Family Practice)  Perez Beckett MD as Assigned PCP    The following health maintenance items are reviewed in Epic and correct as of today:  Health Maintenance   Topic Date Due     MEDICARE ANNUAL WELLNESS VISIT  2021     FALL RISK ASSESSMENT  2021     DTAP/TDAP/TD IMMUNIZATION (2 - Td) 10/28/2024     LIPID  2024     ADVANCE CARE PLANNING  2025     COLONOSCOPY  2025     PHQ-2  Completed     INFLUENZA VACCINE  Completed     PNEUMOCOCCAL IMMUNIZATION 65+ LOW/MEDIUM RISK  Completed     ZOSTER IMMUNIZATION  Completed     AORTIC ANEURYSM SCREENING (SYSTEM ASSIGNED)  Completed     IPV IMMUNIZATION  Aged Out     MENINGITIS IMMUNIZATION  Aged Out       Lab work is in process  Labs reviewed in EPIC  BP Readings from Last 3 Encounters:   20 116/78   19 120/84   19 117/74    Wt Readings from Last 3 Encounters:   20 96.6 kg (213 lb)   19 93.4 kg (206 lb)   19 93.9 kg (207 lb)                  Patient Active Problem List   Diagnosis     Elevated prostate specific antigen (PSA)     Hypothyroidism     Atrial fibrillation (H)     Insomnia     Gastroesophageal reflux disease without esophagitis     Hyperlipidemia     Impotence of organic origin     Urticaria     Arthropathy, lower leg     Advance Care Planning     Hyperlipidemia     Coronary artery disease     Tinea corporis     Pure hypercholesterolemia     SUKH (obstructive sleep apnea)     Benign  paroxysmal positional vertigo, right     Sciatica without back pain, left     Abdominal aortic aneurysm (AAA) without rupture (H)     Past Surgical History:   Procedure Laterality Date     HERNIA REPAIR, INGUINAL RT/LT       OTHER SURGICAL HISTORY      upper and lower partial plate       Social History     Tobacco Use     Smoking status: Former Smoker     Start date: 1966     Last attempt to quit: 1992     Years since quittin.1     Smokeless tobacco: Never Used   Substance Use Topics     Alcohol use: No     Family History   Problem Relation Age of Onset     Cancer Father         prostate     Cancer Brother      Other - See Comments Mother         old age         Current Outpatient Medications   Medication Sig Dispense Refill     Acetaminophen 650 MG TABS Take by mouth daily       aspirin 325 MG tablet Take 325 mg by mouth daily.       cholecalciferol (VITAMIN D-1000 MAX ST) 25 MCG (1000 UT) TABS Take 2 tablets by mouth       Coenzyme Q10-Vitamin E (QUNOL ULTRA COQ10 PO)        Diphenhydramine-APAP, sleep, (ACETAMINOPHEN PM PO) Take 500 mg by mouth       levothyroxine (SYNTHROID/LEVOTHROID) 88 MCG tablet Take 1 tablet (88 mcg) by mouth daily 90 tablet 2     Melatonin 10 MG TABS tablet Take 10 mg by mouth       metoprolol succinate ER (TOPROL-XL) 25 MG 24 hr tablet Take 0.5 tablets (12.5 mg) by mouth daily 1/2 tablet daily 45 tablet 3     Multiple Vitamin (MULTI-VITAMIN PO) Take by mouth daily        rosuvastatin (CRESTOR) 40 MG tablet Take 1 tablet (40 mg) by mouth daily 90 tablet 3     saw palmetto 450 MG CAPS capsule        sotalol (BETAPACE) 80 MG tablet TAKE 1 TABLET BY MOUTH 2 TIMES A DAY. 180 tablet 1     triamcinolone (KENALOG) 0.1 % external cream APPLY TO AFFECTED AREA 3 TIMES A DAY UNTIL SKIN CLEARS  1     vitamin C w/GUERLINE HIPS 1000 MG tablet        Allergies   Allergen Reactions     Atorvastatin      Lipitor--muscle aches     Recent Labs   Lab Test 19  0720 10/21/19  1053  "11/29/18  1525 10/30/18  0818 10/16/18  1455 01/15/18  0756 08/17/17  0744   A1C  --  5.1  --   --   --   --   --    LDL 49  --   --  55  --  50 85   HDL 60  --   --  42  --  60 50   TRIG 130  --   --  71  --  97 80   ALT 28  --   --   --   --  27 <5*   CR  --  0.77  --   --  0.71  --   --    GFRESTIMATED  --  >90  --   --  >90  --   --    GFRESTBLACK  --  >90  --   --  >90  --   --    POTASSIUM  --  4.8  --   --  4.0  --   --    TSH  --  1.41 0.85  --  0.25* 2.43  --           Review of Systems     has Elevated prostate specific antigen (PSA); Hypothyroidism; Atrial fibrillation (H); Insomnia; Gastroesophageal reflux disease without esophagitis; Hyperlipidemia; Impotence of organic origin; Urticaria; Arthropathy, lower leg; Advance Care Planning; Hyperlipidemia; Coronary artery disease; Tinea corporis; Pure hypercholesterolemia; SUKH (obstructive sleep apnea); Benign paroxysmal positional vertigo, right; and Sciatica without back pain, left on their problem list.    HISTORY OF ELEVATED PROSTATE SPECIFIC ANTIGEN WITHIN NORMAL LIMITS LAST YEAR    HISTORY OF ATRIAL FIBRILLATION      GASTROESOPHAGEAL REFLUX DISEASE WITHOUT ESOPHAGITIS CONTROLLED     LDL OR \"BAD\" CHOLESTEROL  GOAL < 100     OSTEOARTHRITIS KNEE PAIN     EXTENSOR TENDONITIS FOOT IMPROVEMENT    OBSTRUCTIVE SLEEP APNEA     BENIGN POSITIONAL VERTIGO HISTORY   Review Of Systems  Skin: negative  Eyes: cataracts  Ears/Nose/Throat: negative RIGHT HEARING LOSS  Respiratory: No shortness of breath, dyspnea on exertion, cough, or hemoptysis  Cardiovascular: negative  Gastrointestinal: negative  Genitourinary: erectile dysfunction  Musculoskeletal: negative  Neurologic: negative  Psychiatric: negative  Hematologic/Lymphatic/Immunologic: negative    Endocrine: negative  =Musculoskeletal: negative  Neurologic: negative  Psychiatric: negative  Hematologic/Lymphatic/Immunologic: negative    Endocrine: negative  =      OBJECTIVE:   /78   Pulse 59   Temp 97.6  F " "(36.4  C) (Tympanic)   Resp 18   Ht 1.715 m (5' 7.5\")   Wt 96.6 kg (213 lb)   SpO2 95%   BMI 32.87 kg/m   Estimated body mass index is 32.87 kg/m  as calculated from the following:    Height as of this encounter: 1.715 m (5' 7.5\").    Weight as of this encounter: 96.6 kg (213 lb).  Physical Exam  GENERAL: healthy, alert and no distress  EYES: Eyes grossly normal to inspection, PERRL and conjunctivae and sclerae normal  HENT: ear canals and TM's normal, nose and mouth without ulcers or lesions  NECK: no adenopathy, no asymmetry, masses, or scars and thyroid normal to palpation  RESP: lungs clear to auscultation - no rales, rhonchi or wheezes  CV: regular rate and rhythm, normal S1 S2, no S3 or S4, no murmur, click or rub, no peripheral edema and peripheral pulses strong  ABDOMEN: soft, nontender, no hepatosplenomegaly, no masses and bowel sounds normal   (male): normal male genitalia without lesions or urethral discharge, no hernia  RECTAL: normal sphincter tone, no rectal masses, prostate normal size, smooth, nontender without nodules or masses  MS: no gross musculoskeletal defects noted, no edema  SKIN: no suspicious lesions or rashes  NEURO: Normal strength and tone, mentation intact and speech normal  PSYCH: mentation appears normal, affect normal/bright  LYMPH: no cervical, supraclavicular, axillary, or inguinal adenopathy  Diabetic foot exam: normal DP and PT pulses, no trophic changes or ulcerative lesions, normal sensory exam and normal monofilament exam    Diagnostic Test Results:  Labs reviewed in Epic   PROSTATE SPECIFIC ANTIGEN     ASSESSMENT / PLAN:       ICD-10-CM    1. Routine history and physical examination of adult Z00.00    2. Screening PSA (prostate specific antigen) Z12.5 Prostate spec antigen screen   3. Abdominal aortic aneurysm (AAA) without rupture (H) I71.4    4. Pure hypercholesterolemia E78.00    5. SUKH (obstructive sleep apnea) G47.33    6. Acquired hypothyroidism E03.9    7. " "Impotence of organic origin N52.9    8. Coronary artery disease involving native coronary artery of native heart without angina pectoris I25.10    9. Benign paroxysmal positional vertigo, right H81.11    10. Chronic atrial fibrillation I48.20        COUNSELING:  Reviewed preventive health counseling, as reflected in patient instructions       Regular exercise       Healthy diet/nutrition       Vision screening       Hearing screening       Dental care       Bladder control       Fall risk prevention       Prostate cancer screening    Estimated body mass index is 32.87 kg/m  as calculated from the following:    Height as of this encounter: 1.715 m (5' 7.5\").    Weight as of this encounter: 96.6 kg (213 lb).    Weight management plan: Patient referred to endocrine and/or weight management specialty     reports that he quit smoking about 27 years ago. He started smoking about 53 years ago. He has never used smokeless tobacco.      Appropriate preventive services were discussed with this patient, including applicable screening as appropriate for cardiovascular disease, diabetes, osteopenia/osteoporosis, and glaucoma.  As appropriate for age/gender, discussed screening for colorectal cancer, prostate cancer, breast cancer, and cervical cancer. Checklist reviewing preventive services available has been given to the patient.    Reviewed patients plan of care and provided an AVS. The Basic Care Plan (routine screening as documented in Health Maintenance) for Jonathon meets the Care Plan requirement. This Care Plan has been established and reviewed with the Patient.    Counseling Resources:  ATP IV Guidelines  Pooled Cohorts Equation Calculator  Breast Cancer Risk Calculator  FRAX Risk Assessment  ICSI Preventive Guidelines  Dietary Guidelines for Americans, 2010  [employer]'s MyPlate  ASA Prophylaxis  Lung CA Screening    RINKU TONEY MD  [facility]    Identified Health Risks:  "

## 2020-01-09 PROBLEM — I71.40 ABDOMINAL AORTIC ANEURYSM (AAA) WITHOUT RUPTURE (H): Status: ACTIVE | Noted: 2020-01-09

## 2020-01-10 DIAGNOSIS — I25.10 CORONARY ARTERY DISEASE INVOLVING NATIVE CORONARY ARTERY OF NATIVE HEART WITHOUT ANGINA PECTORIS: ICD-10-CM

## 2020-01-10 DIAGNOSIS — E78.00 HYPERCHOLESTEROLEMIA: ICD-10-CM

## 2020-01-10 RX ORDER — SOTALOL HYDROCHLORIDE 80 MG/1
TABLET ORAL
Qty: 180 TABLET | Refills: 1 | Status: SHIPPED | OUTPATIENT
Start: 2020-01-10 | End: 2020-07-01

## 2020-01-10 NOTE — TELEPHONE ENCOUNTER
Reason for Call:  Medication or medication refill:    Do you use a [facility]?  Name of the pharmacy and phone number for the current request:     [facility] [id number] IN [place], [place] - [address]    Name of the medication requested: rosuvastatin (CRESTOR) 40 MG tablet       Other request: The patient called and stated that he needs this medication refilled. He stated that there is no rush as he has about a weeks worth of medication left.     Can we leave a detailed message on this number? YES    Phone number patient can be reached at: Cell number on file:    Telephone Information:   Mobile [phone]       Best Time: Any    Call taken on 1/10/2020 at 4:42 PM by Joselyn Ramos

## 2020-01-10 NOTE — TELEPHONE ENCOUNTER
"Requested Prescriptions   Pending Prescriptions Disp Refills     rosuvastatin (CRESTOR) 40 MG tablet  Last Written Prescription Date:  6/21/2019  Last Fill Quantity: 90 tablet,  # refills: 3   Last office visit: 1/3/2020 with prescribing provider:  MARYAM Beckett   Future Office Visit:     90 tablet 3     Sig: Take 1 tablet (40 mg) by mouth daily       Statins Protocol Passed - 1/10/2020  4:43 PM        Passed - LDL on file in past 12 months     Recent Labs   Lab Test 11/05/19  0720   LDL 49             Passed - No abnormal creatine kinase in past 12 months     No lab results found.             Passed - Recent (12 mo) or future (30 days) visit within the authorizing provider's specialty     Patient has had an office visit with the authorizing provider or a provider within the authorizing providers department within the previous 12 mos or has a future within next 30 days. See \"Patient Info\" tab in inbasket, or \"Choose Columns\" in Meds & Orders section of the refill encounter.              Passed - Medication is active on med list        Passed - Patient is age 18 or older           "

## 2020-01-13 RX ORDER — ROSUVASTATIN CALCIUM 40 MG/1
40 TABLET, COATED ORAL DAILY
Qty: 90 TABLET | Refills: 2 | Status: SHIPPED | OUTPATIENT
Start: 2020-01-13 | End: 2020-07-01

## 2020-01-16 ENCOUNTER — TRANSFERRED RECORDS (OUTPATIENT)
Dept: HEALTH INFORMATION MANAGEMENT | Facility: CLINIC | Age: 75
End: 2020-01-16

## 2020-02-25 ENCOUNTER — MYC MEDICAL ADVICE (OUTPATIENT)
Dept: FAMILY MEDICINE | Facility: CLINIC | Age: 75
End: 2020-02-25

## 2020-05-27 DIAGNOSIS — I48.20 CHRONIC ATRIAL FIBRILLATION (H): ICD-10-CM

## 2020-05-27 DIAGNOSIS — E03.9 ACQUIRED HYPOTHYROIDISM: ICD-10-CM

## 2020-05-27 RX ORDER — METOPROLOL SUCCINATE 25 MG/1
12.5 TABLET, EXTENDED RELEASE ORAL DAILY
Qty: 45 TABLET | Refills: 1 | Status: SHIPPED | OUTPATIENT
Start: 2020-05-27 | End: 2020-07-01

## 2020-05-27 RX ORDER — LEVOTHYROXINE SODIUM 88 UG/1
88 TABLET ORAL DAILY
Qty: 90 TABLET | Refills: 1 | Status: SHIPPED | OUTPATIENT
Start: 2020-05-27 | End: 2020-07-01

## 2020-06-30 ENCOUNTER — MYC MEDICAL ADVICE (OUTPATIENT)
Dept: FAMILY MEDICINE | Facility: CLINIC | Age: 75
End: 2020-06-30

## 2020-06-30 DIAGNOSIS — I25.10 CORONARY ARTERY DISEASE INVOLVING NATIVE CORONARY ARTERY OF NATIVE HEART WITHOUT ANGINA PECTORIS: ICD-10-CM

## 2020-06-30 NOTE — TELEPHONE ENCOUNTER
Called pt and LVM asking pt to call back to schedule virtual visit.  Also sent C[employer] message to pt.

## 2020-07-01 ENCOUNTER — TRANSFERRED RECORDS (OUTPATIENT)
Dept: HEALTH INFORMATION MANAGEMENT | Facility: CLINIC | Age: 75
End: 2020-07-01

## 2020-07-01 ENCOUNTER — E-VISIT (OUTPATIENT)
Dept: FAMILY MEDICINE | Facility: CLINIC | Age: 75
End: 2020-07-01
Payer: COMMERCIAL

## 2020-07-01 DIAGNOSIS — E55.9 VITAMIN D DEFICIENCY: Primary | ICD-10-CM

## 2020-07-01 DIAGNOSIS — E78.00 HYPERCHOLESTEROLEMIA: ICD-10-CM

## 2020-07-01 DIAGNOSIS — I25.10 CORONARY ARTERY DISEASE INVOLVING NATIVE CORONARY ARTERY OF NATIVE HEART WITHOUT ANGINA PECTORIS: ICD-10-CM

## 2020-07-01 DIAGNOSIS — L30.9 ECZEMA, UNSPECIFIED TYPE: ICD-10-CM

## 2020-07-01 DIAGNOSIS — E03.9 ACQUIRED HYPOTHYROIDISM: ICD-10-CM

## 2020-07-01 DIAGNOSIS — I48.20 CHRONIC ATRIAL FIBRILLATION (H): ICD-10-CM

## 2020-07-01 DIAGNOSIS — E78.5 HYPERLIPIDEMIA LDL GOAL <70: ICD-10-CM

## 2020-07-01 PROCEDURE — 99422 OL DIG E/M SVC 11-20 MIN: CPT | Performed by: FAMILY MEDICINE

## 2020-07-01 RX ORDER — SOTALOL HYDROCHLORIDE 80 MG/1
TABLET ORAL
Qty: 180 TABLET | Refills: 1 | Status: SHIPPED | OUTPATIENT
Start: 2020-07-01 | End: 2020-12-21

## 2020-07-01 RX ORDER — TRIAMCINOLONE ACETONIDE 1 MG/G
CREAM TOPICAL
Qty: 45 G | Refills: 1 | Status: SHIPPED | OUTPATIENT
Start: 2020-07-01 | End: 2021-10-20

## 2020-07-01 RX ORDER — ROSUVASTATIN CALCIUM 40 MG/1
40 TABLET, COATED ORAL DAILY
Qty: 90 TABLET | Refills: 2 | Status: SHIPPED | OUTPATIENT
Start: 2020-07-01 | End: 2021-03-12

## 2020-07-01 RX ORDER — LEVOTHYROXINE SODIUM 88 UG/1
88 TABLET ORAL DAILY
Qty: 90 TABLET | Refills: 1 | Status: SHIPPED | OUTPATIENT
Start: 2020-07-01 | End: 2021-02-17

## 2020-07-01 RX ORDER — METOPROLOL SUCCINATE 25 MG/1
12.5 TABLET, EXTENDED RELEASE ORAL DAILY
Qty: 45 TABLET | Refills: 1 | Status: SHIPPED | OUTPATIENT
Start: 2020-07-01 | End: 2021-02-17

## 2020-07-01 RX ORDER — PSYLLIUM HUSK 0.4 G
2 CAPSULE ORAL DAILY
Qty: 180 TABLET | Refills: 3 | Status: SHIPPED | OUTPATIENT
Start: 2020-07-01

## 2020-07-01 NOTE — TELEPHONE ENCOUNTER
Called pt and LVM asking pt to call back to schedule virtual visit.  Routing to provider for consideration of a khushbu refill, multiple failed attempts to schedule. Please advise.

## 2020-07-01 NOTE — TELEPHONE ENCOUNTER
Provider E-Visit time total : 20  MINUTES  RINKU TONEY JR., MD     .RINKU TONEY MD .7/1/2020 6:52 PM .July 1, 2020        Jonathon Guerra is a 74 year old male who is who presents with medication refill    Onset :  Years     Severity: moderate       Home treatments  See patient insturctions     Additional Symptoms:  Asymptomatic CORONARY ARTERY DISEASE      Course STABLE     HISTORY OF INTERMITTENT ATRIAL FIBRILLATION                      .  Current Outpatient Medications   Medication Sig Dispense Refill     cholecalciferol (VITAMIN D-1000 MAX ST) 25 MCG (1000 UT) TABS Take 2 tablets by mouth daily 180 tablet 3     levothyroxine (SYNTHROID/LEVOTHROID) 88 MCG tablet Take 1 tablet (88 mcg) by mouth daily 90 tablet 1     metoprolol succinate ER (TOPROL-XL) 25 MG 24 hr tablet Take 0.5 tablets (12.5 mg) by mouth daily 1/2 tablet daily 45 tablet 1     rosuvastatin (CRESTOR) 40 MG tablet Take 1 tablet (40 mg) by mouth daily 90 tablet 2     triamcinolone (KENALOG) 0.1 % external cream APPLY TO AFFECTED AREA 3 TIMES A DAY UNTIL SKIN CLEARS 45 g 1     Acetaminophen 650 MG TABS Take by mouth daily       aspirin 325 MG tablet Take 325 mg by mouth daily.       Coenzyme Q10-Vitamin E (QUNOL ULTRA COQ10 PO)        Diphenhydramine-APAP, sleep, (ACETAMINOPHEN PM PO) Take 500 mg by mouth       Melatonin 10 MG TABS tablet Take 10 mg by mouth       Multiple Vitamin (MULTI-VITAMIN PO) Take by mouth daily        saw palmetto 450 MG CAPS capsule        sotalol (BETAPACE) 80 MG tablet TAKE 1 TABLET BY MOUTH TWICE A  tablet 1     vitamin C w/GUERLINE HIPS 1000 MG tablet                 Allergies   Allergen Reactions     Atorvastatin      Lipitor--muscle aches           Immunization History   Administered Date(s) Administered     HepB-Adult 08/21/2017, 11/21/2017     Hepatitis A Immunity: Titer/MD Dx 08/16/2017     Influenza (High Dose) 3 valent vaccine 10/28/2014, 11/06/2015, 09/20/2016, 10/04/2017, 10/16/2018, 09/27/2019      Influenza (IIV3) PF 2010     MMR Not Indicated - By Titer 2017     Pneumo Conj 13-V (2010&after) 2017     Pneumococcal 23 valent 10/28/2014     Pneumococcal, Unspecified 10/01/2012     Rabies - IM Diploid Cell Culture 2017     Rabies - IM Fibroblast Culture 2017, 2017     TDAP Vaccine (Adacel) 10/28/2014     Typhoid IM 2017     Zoster vaccine recombinant adjuvanted (SHINGRIX) 2019, 10/03/2019     Zoster vaccine, live 2010               reports no history of alcohol use.          reports no history of drug use.        family history includes Cancer in his brother and father; Other - See Comments in his mother.        He indicated that his mother is . He indicated that his father is . He indicated that only one of his two sisters is alive. He indicated that four of his six brothers are alive.             has a past surgical history that includes hernia repair, inguinal rt/lt and other surgical history.         reports previously being sexually active and has had partner(s) who are Male.    .  Pediatric History   Patient Parents     Not on file     Other Topics Concern     Parent/sibling w/ CABG, MI or angioplasty before 65F 55M? No      Service Not Asked     Blood Transfusions Not Asked     Caffeine Concern No     Occupational Exposure Not Asked     Hobby Hazards Not Asked     Sleep Concern No     Stress Concern Not Asked     Weight Concern Not Asked     Special Diet No     Back Care Not Asked     Exercise Yes     Comment: running or walking      Bike Helmet Not Asked     Seat Belt No     Self-Exams Not Asked   Social History Narrative    --------------------------------------------------------------------------------    Surgical History  Return To Top     Status Surgery Time Frame Comment Record Date     Inactive  inguinal hernia      2008      Inactive  Surgery  UPPER AND LOWER PARTIAL PLATE    2008           --------------------------------------------------------------------------------    Food Allergy  Return To Top     Allergen Reaction Comment Record Date     * No known food allergies      8/26/2008          --------------------------------------------------------------------------------    Drug Allergy  Return To Top     Allergen Reaction Comment Record Date     Penicillin      5/6/2009      LIPITOR  MUSCLE ACHES    6/5/2007          --------------------------------------------------------------------------------    Environment Allergy  Return To Top     Allergen Reaction Comment Record Date     * No known environmental allergies  RASH    8/26/2008          --------------------------------------------------------------------------------    Immunization History Return To Top     Funding Source Vaccine Type of Vaccine Date Given Route Site Given Lot#  Exp. Date Date on VIS Date Given VIS Vaccinator Note       Hepatitis A #1 (Adult)  HepA - Adult  6/6/2007  IM  Left Deltoid  [id number]  GSK    03/21/2006 06/06/2007  LUIS ALFREDO Jean CMA          Hepatitis A #2(Adult)  HepA - Adult  8/26/2008  IM  Rightt Deltoid  [id number]  GSK  9/4/2010 03/21/2006 8/26/2008  KODI Love          Hepatitis B #1 (Adult)  HepB -Adult  6/6/2007  IM  Right Deltoid  [id number]  GSK    07/11/2001 06/06/2007  LUIS ALFREDO Heltonh KODI          Hepatitis B #2 (Adult)  HepB -Adult  8/26/2008 8/26/2008            Herpes Zoster (Zostavax) age 60 +  Herpes Zoster  8/26/2008 8/26/2008            Herpes Zoster (Zostavax) age 60 +  Herpes Zoster  9/2/08  SQ  Right Deltoid  [id number]  MRK  7/25/09 9/11/06 9/2/08  [facility]        Private; Private  Influenza (Adult) Seasonal  Flu (>= 3yrs)  9/23/2009  IM; IM  Left Deltoid; Left Deltoid  [id number]  OSITO; OSITO  06/30/2010 8/11/2009 9/23/2009  NEISHA Solorzano LPN            --------------------------------------------------------------------------------    Social History  Return To Top      Question Answer Comment Record Date     Advance Directive or Living Will  Form Given to Patient    8/26/2008      Emotional Abuse  No    1/20/2012      Exercise  Yes    8/26/2008      Physical Abuse  No    1/20/2012      Sexual Abuse  No    1/20/2012      Tobacco history  Has never smoked or chewed tobacco    8/26/2008      Alcohol history  Never drinks alcohol    8/26/2008      Has the patient ever used illegal drugs?  Has never used illegal drugs    1/20/2012          --------------------------------------------------------------------------------    History - Overall Remark: 1/20/2012 Return To Top     MEDICATIONS (including any changes made today):    1. Fish Oil 1200 mg, 1 p.o. twice daily    2. Niacin 500 mg Tablet, 1 twice daily    3. Vitamin B Complex Tablet, 1 p.o. daily    4. Levothyroxine 125 mcg Tablet, 1 p.o. daily    5. Aspirin 325 mg Tablet, 1 p.o. daily    6. Red Yeast Rice Extract 600 mg Capsule, 1200mg po BID    7. Nitroglycerin 0.4 mg Tablet, Sublingual, as directed for chest pain    8. Toprol XL 25 mg Tablet Sustained Release 24 hr, 1 p.o. twice daily    9. Centrum Silver Tablet, 1 p.o. daily    10. Calcium And Magnesium 750-465mg Tablet, 1 p.o. daily    11. Testosterone 50 mg/mI, Pt use 90mg    12. Tikosyn 250 mcg Capsule, 2 p.o. twice daily    13. Trazodone 50mg Tablet, 2 po at HS daily PRN sleep    --------------------------------------------------------------------------------    Medical History Return To Top     Status Diagnosis Time Frame Comment Record Date     Active (466.0) - C - Bronchitis, acute      1/20/2012      Active (790.93) - C - Elevated PSA      6/23/2011      Active (V74.5) - C - Screening, STD      6/23/2011      Active (244.9) - C - Hypothyroidism      10/22/2010      Active (V76.44) - C - Screening, prostate      8/26/2010      Active (380.4) - C - Ceruminosis      8/26/2010      Active (427.31) - C - Atrial fibrillation      8/26/2010      Active 780.52 Insomnia       5/26/2009      Active (427.31) - C - Atrial fibrillation      5/26/2009      Active (530.81) - C - GERD      5/6/2009      Active 427.31 Atrial fibrillation      5/4/2009      Active 719.44 Hand pain      1/22/2009      Active (719.44) - C - Hand pain      1/21/2009      Active 780.79 Fatigue      9/2/2008      Active V05.8 Immunization, other, specified      9/2/2008      Active (272.4) - C - Hyperlipidemia      8/26/2008      Active (V05.8) - C - Immunization, other, specified      8/26/2008      Active (607.84) - C - Erectile Dysfunction      8/26/2008      Active 708.9 UNSP URTICARIA      6/6/2007      Active V05.3 Hepatitis vaccination      6/6/2007      Active 716.96 ARTHROPATHY UNSP LEG      6/6/2007      Active (V58.69) - C - Other medication management      5/29/2007      Active (V70.0) - C - Routine medical exam      5/29/2007      Active (V76.51) - C - Screening, colon      5/29/2007      Inactive  (427.31) - C - Atrial fibrillation    GOOD CONTROL NOW CONVERTED LAST THURSDAY 7/22/2010      Inactive  (V04.81) - C - Influenza vaccination      9/23/2009      Inactive  (427.31) - C - Atrial fibrillation      5/6/2009      Inactive  380.4 Ceruminosis    LEFT EAR  4/8/2009      Inactive  V74.5 Screening, STD      4/8/2009      Inactive  466.0 Bronchitis, acute      4/8/2009                         reports that he quit smoking about 27 years ago. He started smoking about 54 years ago. He has never used smokeless tobacco.        Medical, social, surgical, and family histories reviewed.        Labs reviewed in EPIC  Patient Active Problem List   Diagnosis     Elevated prostate specific antigen (PSA)     Hypothyroidism     Atrial fibrillation (H)     Insomnia     Gastroesophageal reflux disease without esophagitis     Hyperlipidemia     Impotence of organic origin     Urticaria     Arthropathy, lower leg     Advance Care Planning     Hyperlipidemia     Coronary artery disease     Tinea corporis     Pure  hypercholesterolemia     SUKH (obstructive sleep apnea)     Benign paroxysmal positional vertigo, right     Sciatica without back pain, left     Abdominal aortic aneurysm (AAA) without rupture (H)       Past Surgical History:   Procedure Laterality Date     HERNIA REPAIR, INGUINAL RT/LT       OTHER SURGICAL HISTORY      upper and lower partial plate         Social History     Tobacco Use     Smoking status: Former Smoker     Start date: 1966     Last attempt to quit: 1992     Years since quittin.6     Smokeless tobacco: Never Used   Substance Use Topics     Alcohol use: No       Family History   Problem Relation Age of Onset     Cancer Father         prostate     Cancer Brother      Other - See Comments Mother         old age             Current Outpatient Medications   Medication Sig Dispense Refill     cholecalciferol (VITAMIN D-1000 MAX ST) 25 MCG (1000 UT) TABS Take 2 tablets by mouth daily 180 tablet 3     levothyroxine (SYNTHROID/LEVOTHROID) 88 MCG tablet Take 1 tablet (88 mcg) by mouth daily 90 tablet 1     metoprolol succinate ER (TOPROL-XL) 25 MG 24 hr tablet Take 0.5 tablets (12.5 mg) by mouth daily 1/2 tablet daily 45 tablet 1     rosuvastatin (CRESTOR) 40 MG tablet Take 1 tablet (40 mg) by mouth daily 90 tablet 2     triamcinolone (KENALOG) 0.1 % external cream APPLY TO AFFECTED AREA 3 TIMES A DAY UNTIL SKIN CLEARS 45 g 1     Acetaminophen 650 MG TABS Take by mouth daily       aspirin 325 MG tablet Take 325 mg by mouth daily.       Coenzyme Q10-Vitamin E (QUNOL ULTRA COQ10 PO)        Diphenhydramine-APAP, sleep, (ACETAMINOPHEN PM PO) Take 500 mg by mouth       Melatonin 10 MG TABS tablet Take 10 mg by mouth       Multiple Vitamin (MULTI-VITAMIN PO) Take by mouth daily        saw palmetto 450 MG CAPS capsule        sotalol (BETAPACE) 80 MG tablet TAKE 1 TABLET BY MOUTH TWICE A  tablet 1     vitamin C w/GUERLINE HIPS 1000 MG tablet              Recent Labs   Lab Test 19  0720  10/21/19  1054 11/29/18  1525 10/30/18  0818 10/16/18  1455 01/15/18  0756 08/17/17  0744   A1C  --  5.1  --   --   --   --   --    LDL 49  --   --  55  --  50 85   HDL 60  --   --  42  --  60 50   TRIG 130  --   --  71  --  97 80   ALT 28  --   --   --   --  27 <5*   CR  --  0.77  --   --  0.71  --   --    GFRESTIMATED  --  >90  --   --  >90  --   --    GFRESTBLACK  --  >90  --   --  >90  --   --    POTASSIUM  --  4.8  --   --  4.0  --   --    TSH  --  1.41 0.85  --  0.25* 2.43  --             BP Readings from Last 6 Encounters:   01/03/20 116/78   11/05/19 120/84   11/01/19 117/74   10/21/19 102/70   11/29/18 104/60   11/09/18 98/66           Wt Readings from Last 3 Encounters:   01/03/20 96.6 kg (213 lb)   11/05/19 93.4 kg (206 lb)   11/01/19 93.9 kg (207 lb)                 Positive symptoms or findings indicated by bold designation:         ROS: 10 point ROS neg other than the symptoms noted above in the HPI.except  [unfilled]  Review Of Systems    Skin: negative    Eyes: negative    Ears/Nose/Throat: negative    Respiratory: No shortness of breath, dyspnea on exertion, cough, or hemoptysis    Cardiovascular:  HISTORY OF CORONARY ARTERY DISEASE     ATRIAL FIBRILLATION  INTERMITTENT with TACHYCARDIA HISTORY     ASYMPTOMATIC     Gastrointestinal: negative    Genitourinary: negative    Musculoskeletal: negative    Neurologic: negative    Psychiatric: negative    Hematologic/Lymphatic/Immunologic: negative    Endocrine: negative                PE:  There were no vitals taken for this visit. There is no height or weight on file to calculate BMI.                        ICD-10-CM    1. Vitamin D deficiency  E55.9 cholecalciferol (VITAMIN D-1000 MAX ST) 25 MCG (1000 UT) TABS   2. Acquired hypothyroidism  E03.9 levothyroxine (SYNTHROID/LEVOTHROID) 88 MCG tablet   3. Chronic atrial fibrillation (H)  I48.20 metoprolol succinate ER (TOPROL-XL) 25 MG 24 hr tablet   4. Hypercholesterolemia  E78.00 rosuvastatin (CRESTOR) 40 MG  tablet   5. Eczema, unspecified type  L30.9 triamcinolone (KENALOG) 0.1 % external cream   6. Coronary artery disease involving native coronary artery of native heart without angina pectoris  I25.10    7. Hyperlipidemia LDL goal <70  E78.5               .    Side effects benefits and risks thoroughly discussed. .he may come in early if unimproved or getting worse          Please drink 2 glasses of water prior to meals and walk 15-30 minutes after meals        I spent 15 MINUTES   with patient discussing the following issues   The primary encounter diagnosis was Vitamin D deficiency. Diagnoses of Acquired hypothyroidism, Chronic atrial fibrillation (H), Hypercholesterolemia, Eczema, unspecified type, Coronary artery disease involving native coronary artery of native heart without angina pectoris, and Hyperlipidemia LDL goal <70 were also pertinent to this visit. over half of which involved counseling and coordination of care.      Patient Instructions     Thank you for choosing us for your care. I have placed an order for a prescription so that you can start treatment. View your full visit summary for details by clicking on the link below. Your pharmacist will able to address any questions you may have about the medication.     If you're not feeling better within 5-7 days, please schedule an appointment.  You can schedule an appointment right here in [facility], or call [phone]  If the visit is for the same symptoms as your e-visit, we'll refund the cost of your e-visit if seen within seven days.              ALL THE ABOVE PROBLEMS ARE STABLE AND MED CHANGES AS NOTED        Diet:  MEDITERRANEAN DIET         Exercise:  AS TOLERATED WALK IN PLACE 15 MINUTES WITH EACH MEAL  AND WALKING AND BIKING  Exercises Range of motion, balance, isometric, and strengthening exercises 30 repetitions twice daily of involved joints            .RINKU TONEY MD 7/1/2020 6:52 PM  July 1, 2020

## 2020-07-01 NOTE — TELEPHONE ENCOUNTER
Provider E-Visit time total (minutes): 15minutes  SUBJECTIVE:.  .(E55.9) Vitamin D deficiency  (primary encounter diagnosis)  Comment:    Plan: cholecalciferol (VITAMIN D-1000 MAX ST) 25 MCG         (1000 UT) TABS             (E03.9) Acquired hypothyroidism  Comment:    Plan: levothyroxine (SYNTHROID/LEVOTHROID) 88 MCG         tablet              (I48.20) Chronic atrial fibrillation (H)  Comment:    Plan: metoprolol succinate ER (TOPROL-XL) 25 MG 24 hr        tablet                 (E78.00) Hypercholesterolemia  Comment:    Plan: rosuvastatin (CRESTOR) 40 MG tablet              (L30.9) Eczema, unspecified type  Comment:    Plan: triamcinolone (KENALOG) 0.1 % external cream

## 2020-07-01 NOTE — PATIENT INSTRUCTIONS
Thank you for choosing us for your care. I have placed an order for a prescription so that you can start treatment. View your full visit summary for details by clicking on the link below. Your pharmacist will able to address any questions you may have about the medication.     If you're not feeling better within 5-7 days, please schedule an appointment.  You can schedule an appointment right here in [place], or call [phone]  If the visit is for the same symptoms as your e-visit, we'll refund the cost of your e-visit if seen within seven days.

## 2020-08-03 NOTE — PATIENT INSTRUCTIONS
GOOD     TUMERIC    BERRIES     IAN    CINNAMON     COLLAGEN 2     CHICKEN SOUP     PAPAYA AND PINEAPPLE    ORANGE AND TANGERINES AND GRAPEFRUIT    SPINACH AND KALE     KEFIR  AND YOGURT     FISH OIL     Avoid SUGAR    TUMERIC         BAD     AVOID GREEN AND RED PEPPER    AVOID SUGAR  AND POTATOES AND GLUTEN    WHEAT PRODUCTS  TOMATOES  WORSENED JOINTS  .(M77.9) Extensor tendinitis of foot  (primary encounter diagnosis)  Comment:    Plan: diclofenac (VOLTAREN) 1 % topical gel,         Erythrocyte sedimentation rate auto             (E03.9) Acquired hypothyroidism  Comment:    Plan:       (E03.8) TSH (thyroid-stimulating hormone deficiency)  Comment:      Plan: TSH           With all the stuff going on the background of  (R73.9) Hyperglycemia  Comment:     Plan: Hemoglobin A1c             (E78.00) Pure hypercholesterolemia  Comment:    Plan: Basic metabolic panel, CANCELED: Lipid panel         reflex to direct LDL Fasting, CANCELED: ALT  RINKU TONEY JR., MD well                 170.18

## 2020-09-04 NOTE — PROGRESS NOTES
[facility]  Orthopedics  Rafy Reddy, DO  2019     Name: Jonathon Guerra  MRN: [id number]  Age: 74 year old  : 1945  Referring provider: Referred Self     Chief Complaint:  Left foot pain    History of Present Illness:   Jonathon Guerra is a 74 year old male who presents today for evaluation of left ankle pain. The patient states that over the last few months he has been experiencing increasing anterior medial left ankle pain after he goes on long walks. The patient states that this sometime radiates back to his heel. Patient states that he is wearing good, supportive walking shoes while he does this. The patient saw his primary for this who thought this could be extensor tendonitis and placed the patient on Diclofenac. Of note, the provider did not prescribe the patient oral anti-inflammatories due to underlying heart conditions. The patient states that this did not help his symptoms and he tried CBD lotion as well with no improvement.  Pain does not radiate into forefoot. No numbness or tingling.        What part of your body is injured / painful?  left foot    What caused the injury /pain? Unsure    How long ago did your injury occur or pain begin? Couple months     What are your most bothersome symptoms? Pain and Swelling    How would you characterize your symptom?  aching    What makes your symptoms better? Nothing    What makes your symptoms worse? Walking    Have you been previously seen for this problem? Yes,     Review of Systems:   A 10-point review of systems was obtained and is negative except for as noted in the HPI.     Medications:     Current Outpatient Medications:      Acetaminophen 650 MG TABS, Take by mouth daily, Disp: , Rfl:      aspirin 325 MG tablet, Take 325 mg by mouth daily., Disp: , Rfl:      cholecalciferol (VITAMIN D3) 1000 UNIT tablet, Take 1 tablet by mouth daily , Disp: , Rfl:      diclofenac (VOLTAREN) 1 % topical gel, Apply 2 g topically 4 times daily, Disp: 200  g, Rfl: 1     Diphenhydramine-APAP, sleep, (ACETAMINOPHEN PM PO), Take 500 mg by mouth, Disp: , Rfl:      levothyroxine (SYNTHROID/LEVOTHROID) 88 MCG tablet, Take 1 tablet (88 mcg) by mouth daily, Disp: 90 tablet, Rfl: 2     MELATONIN PO, Take 10 mg by mouth At Bedtime , Disp: , Rfl:      metoprolol succinate ER (TOPROL-XL) 25 MG 24 hr tablet, Take 0.5 tablets (12.5 mg) by mouth daily 1/2 tablet daily, Disp: 45 tablet, Rfl: 3     Multiple Vitamin (MULTI-VITAMIN PO), Take by mouth daily , Disp: , Rfl:      Nitroglycerin (NITROSTAT SL), Place 0.4 mg under the tongue., Disp: , Rfl:      rosuvastatin (CRESTOR) 40 MG tablet, Take 1 tablet (40 mg) by mouth daily, Disp: 90 tablet, Rfl: 3     sotalol (BETAPACE) 80 MG tablet, TAKE 1 TABLET BY MOUTH 2 TIMES A DAY., Disp: 180 tablet, Rfl: 1    Allergies:  Atorvastatin     Past Medical History:  Afib  CAD  HDL  HTN  Thyroid disease    Past Surgical History:  Hernia repair     Social History:  Patient lives with his wife.  Retired mail . He denies tobacco use and denies alcohol use.   Denies drug use.    Family History:  No pertinent family history.    Physical Examination:  There were no vitals taken for this visit.  General  - normal appearance, in no obvious distress  HEENT  -Pupils equal, round, no conjunctival injection.  No lid lag  CV  - normal pulses at posterior tib and dorsalis pedis  Pulm  - normal respiratory pattern, non-labored   Musculoskeletal - left ankle  - stance: normal gait without limp, no obvious leg length discrepancy  - inspection: no swelling or effusion, significantly shorter fourth and fifth toes.  - palpation: Tenderness just distal to the medial malleolus. Tenderness in the distribution of the posterior tibial tendon. Additional tenderness anteriorly in region of anterior ankle joint and extensor digitorum. Lateral ankle in non-tender. No tenderness at the lateral tibialis tendon. soft tissue tenderness. malleoli and tarsal bones  non-tender  - ROM: normal dorsiflexion, plantar flexion, inversion, eversion, not painful  - strength: 5/5 in all planes  - special tests:  (-) anterior drawer  (-) talar tilt  (-) Tinel's  Neuro  - no sensory or motor deficit, grossly normal coordination, normal muscle tone  Skin  - no ecchymosis, erythema, warmth, or induration, no obvious rash  Psych  - interactive, appropriate, normal mood and affect    Imaging:   XR LEFT ANKLE AND FOOT:  No significant ankle or mid-foot changes. Large osteophyte and joint space narrowing at first MTP joint.   No acute osseous abnormalities.     I have independently reviewed the above imaging studies; the results were discussed with the patient.     Assessment:   74 year old male presenting with left anterior ankle pain for the past 2 months. Suspected tendonitis vs early ankle degenerative changes. Treatment options discussed and will initiate ankle and foot exercise program and stabilizing brace as outlined below.    Plan:   -Lace-up ankle brace.  -Diclofenac ankle gel  -Orthotic inserts discussed  -HEP provided and demonstrated  - Follow up in 4 weeks if no improvement; consider advanced imaging.    It was a pleasure seeing Jonathon today.    Rafy Reddy DO, [facility]  Primary Care Sports Medicine    I, Rafy Reddy DO, have reviewed the above note and agree with the scribe's notation as written.    Scribe Disclosure:  I, Stefan Wilson, am serving as a scribe to document services personally performed by Rafy Reddy DO at this visit, based upon the provider's statements to me. All documentation has been reviewed by the aforementioned provider prior to being entered into the official medical record.      Yes

## 2020-09-10 ENCOUNTER — OFFICE VISIT (OUTPATIENT)
Dept: OPHTHALMOLOGY | Facility: CLINIC | Age: 75
End: 2020-09-10
Attending: STUDENT IN AN ORGANIZED HEALTH CARE EDUCATION/TRAINING PROGRAM
Payer: COMMERCIAL

## 2020-09-10 DIAGNOSIS — H52.13 MYOPIA WITH ASTIGMATISM AND PRESBYOPIA, BILATERAL: Primary | ICD-10-CM

## 2020-09-10 DIAGNOSIS — H52.203 MYOPIA WITH ASTIGMATISM AND PRESBYOPIA, BILATERAL: Primary | ICD-10-CM

## 2020-09-10 DIAGNOSIS — H52.4 MYOPIA WITH ASTIGMATISM AND PRESBYOPIA, BILATERAL: Primary | ICD-10-CM

## 2020-09-10 PROCEDURE — G0463 HOSPITAL OUTPT CLINIC VISIT: HCPCS | Mod: ZF

## 2020-09-10 PROCEDURE — 92015 DETERMINE REFRACTIVE STATE: CPT | Mod: ZF

## 2020-09-10 ASSESSMENT — VISUAL ACUITY
OS_CC: 20/20
OS_CC+: -1
CORRECTION_TYPE: GLASSES
OD_CC: 20/25
METHOD: SNELLEN - LINEAR
OS_CC: J1+
OD_CC+: +3
OD_CC: J2

## 2020-09-10 ASSESSMENT — REFRACTION_WEARINGRX
OD_AXIS: 178
OD_SPHERE: -0.50
OS_SPHERE: -1.25
OS_CYLINDER: +1.50
OS_ADD: +2.00
OS_AXIS: 009
OD_ADD: +2.00
SPECS_TYPE: PAL
OD_CYLINDER: +0.75

## 2020-09-10 ASSESSMENT — TONOMETRY
OS_IOP_MMHG: 17
OD_IOP_MMHG: 15
IOP_METHOD: APPLANATION

## 2020-09-10 ASSESSMENT — REFRACTION_MANIFEST
OS_AXIS: 009
OS_CYLINDER: +1.50
OS_ADD: +2.25
OD_AXIS: 178
OD_SPHERE: -0.75
OS_SPHERE: -1.50
OD_ADD: +2.25
OD_CYLINDER: +1.00

## 2020-09-10 ASSESSMENT — EXTERNAL EXAM - LEFT EYE: OS_EXAM: NORMAL

## 2020-09-10 ASSESSMENT — CONF VISUAL FIELD
METHOD: COUNTING FINGERS
OD_NORMAL: 1
OS_NORMAL: 1

## 2020-09-10 ASSESSMENT — SLIT LAMP EXAM - LIDS
COMMENTS: NORMAL
COMMENTS: NORMAL

## 2020-09-10 ASSESSMENT — EXTERNAL EXAM - RIGHT EYE: OD_EXAM: NORMAL

## 2020-09-10 NOTE — NURSING NOTE
Chief Complaints and History of Present Illnesses   Patient presents with     Annual Eye Exam     New Pt.  CEE.     Chief Complaint(s) and History of Present Illness(es)     Annual Eye Exam     Laterality: both eyes    Onset: gradual    Onset: months ago    Associated symptoms: floaters (BE, unchanged.), tearing and haloes.  Negative for eye pain, dryness, flashes, glare and double vision    Pain scale: 0/10    Comments: New Pt.  CEE.              Comments     DVA is stable and NVA has decreased some.  Pt has Hx of optical migraines over a year ago one time less than 10 minutes.  Pt does not take any drops.    BALDO Guzman September 10, 2020 2:43 PM

## 2020-09-18 ENCOUNTER — TRANSFERRED RECORDS (OUTPATIENT)
Dept: HEALTH INFORMATION MANAGEMENT | Facility: CLINIC | Age: 75
End: 2020-09-18

## 2020-10-20 ASSESSMENT — ENCOUNTER SYMPTOMS
EYE PAIN: 0
FLANK PAIN: 0
SMELL DISTURBANCE: 0
DOUBLE VISION: 0
TROUBLE SWALLOWING: 0
SORE THROAT: 0
HOARSE VOICE: 0
SINUS CONGESTION: 0
EYE WATERING: 1
SINUS PAIN: 0
EYE REDNESS: 0
NECK MASS: 0
TASTE DISTURBANCE: 0
EYE IRRITATION: 1
DYSURIA: 0
HEMATURIA: 0
DIFFICULTY URINATING: 1

## 2020-10-26 ENCOUNTER — DOCUMENTATION ONLY (OUTPATIENT)
Dept: CARE COORDINATION | Facility: CLINIC | Age: 75
End: 2020-10-26

## 2020-10-30 DIAGNOSIS — I25.10 ATHEROSCLEROSIS OF NATIVE CORONARY ARTERY OF NATIVE HEART WITHOUT ANGINA PECTORIS: ICD-10-CM

## 2020-10-30 LAB
ANION GAP SERPL CALCULATED.3IONS-SCNC: 4 MMOL/L (ref 3–14)
BASOPHILS # BLD AUTO: 0 10E9/L (ref 0–0.2)
BASOPHILS NFR BLD AUTO: 0.3 %
BUN SERPL-MCNC: 16 MG/DL (ref 7–30)
CALCIUM SERPL-MCNC: 8.8 MG/DL (ref 8.5–10.1)
CHLORIDE SERPL-SCNC: 107 MMOL/L (ref 94–109)
CHOLEST SERPL-MCNC: 118 MG/DL
CO2 SERPL-SCNC: 30 MMOL/L (ref 20–32)
CREAT SERPL-MCNC: 0.9 MG/DL (ref 0.66–1.25)
DIFFERENTIAL METHOD BLD: NORMAL
EOSINOPHIL # BLD AUTO: 0.2 10E9/L (ref 0–0.7)
EOSINOPHIL NFR BLD AUTO: 2.5 %
ERYTHROCYTE [DISTWIDTH] IN BLOOD BY AUTOMATED COUNT: 12.5 % (ref 10–15)
GFR SERPL CREATININE-BSD FRML MDRD: 84 ML/MIN/{1.73_M2}
GLUCOSE SERPL-MCNC: 89 MG/DL (ref 70–99)
HCT VFR BLD AUTO: 41.4 % (ref 40–53)
HDLC SERPL-MCNC: 59 MG/DL
HGB BLD-MCNC: 13.8 G/DL (ref 13.3–17.7)
IMM GRANULOCYTES # BLD: 0 10E9/L (ref 0–0.4)
IMM GRANULOCYTES NFR BLD: 0.2 %
LDLC SERPL CALC-MCNC: 49 MG/DL
LYMPHOCYTES # BLD AUTO: 2.8 10E9/L (ref 0.8–5.3)
LYMPHOCYTES NFR BLD AUTO: 43.8 %
MCH RBC QN AUTO: 31.2 PG (ref 26.5–33)
MCHC RBC AUTO-ENTMCNC: 33.3 G/DL (ref 31.5–36.5)
MCV RBC AUTO: 94 FL (ref 78–100)
MONOCYTES # BLD AUTO: 0.6 10E9/L (ref 0–1.3)
MONOCYTES NFR BLD AUTO: 9.6 %
NEUTROPHILS # BLD AUTO: 2.8 10E9/L (ref 1.6–8.3)
NEUTROPHILS NFR BLD AUTO: 43.6 %
NONHDLC SERPL-MCNC: 59 MG/DL
NRBC # BLD AUTO: 0 10*3/UL
NRBC BLD AUTO-RTO: 0 /100
PLATELET # BLD AUTO: 159 10E9/L (ref 150–450)
POTASSIUM SERPL-SCNC: 4.4 MMOL/L (ref 3.4–5.3)
RBC # BLD AUTO: 4.42 10E12/L (ref 4.4–5.9)
SODIUM SERPL-SCNC: 141 MMOL/L (ref 133–144)
TRIGL SERPL-MCNC: 52 MG/DL
WBC # BLD AUTO: 6.5 10E9/L (ref 4–11)

## 2020-10-30 PROCEDURE — 85025 COMPLETE CBC W/AUTO DIFF WBC: CPT | Performed by: PATHOLOGY

## 2020-10-30 PROCEDURE — 80048 BASIC METABOLIC PNL TOTAL CA: CPT | Performed by: PATHOLOGY

## 2020-10-30 PROCEDURE — 36415 COLL VENOUS BLD VENIPUNCTURE: CPT | Performed by: PATHOLOGY

## 2020-10-30 PROCEDURE — 80061 LIPID PANEL: CPT | Performed by: PATHOLOGY

## 2020-11-03 ENCOUNTER — OFFICE VISIT (OUTPATIENT)
Dept: CARDIOLOGY | Facility: CLINIC | Age: 75
End: 2020-11-03
Attending: INTERNAL MEDICINE
Payer: COMMERCIAL

## 2020-11-03 VITALS
DIASTOLIC BLOOD PRESSURE: 86 MMHG | WEIGHT: 213 LBS | BODY MASS INDEX: 32.28 KG/M2 | SYSTOLIC BLOOD PRESSURE: 130 MMHG | HEIGHT: 68 IN | HEART RATE: 81 BPM | OXYGEN SATURATION: 95 %

## 2020-11-03 DIAGNOSIS — I25.10 ATHEROSCLEROSIS OF NATIVE CORONARY ARTERY OF NATIVE HEART WITHOUT ANGINA PECTORIS: ICD-10-CM

## 2020-11-03 PROCEDURE — 99214 OFFICE O/P EST MOD 30 MIN: CPT | Performed by: INTERNAL MEDICINE

## 2020-11-03 PROCEDURE — G0463 HOSPITAL OUTPT CLINIC VISIT: HCPCS

## 2020-11-03 RX ORDER — INFLUENZA A VIRUS A/MICHIGAN/45/2015 X-275 (H1N1) ANTIGEN (FORMALDEHYDE INACTIVATED), INFLUENZA A VIRUS A/SINGAPORE/INFIMH-16-0019/2016 IVR-186 (H3N2) ANTIGEN (FORMALDEHYDE INACTIVATED), INFLUENZA B VIRUS B/PHUKET/3073/2013 ANTIGEN (FORMALDEHYDE INACTIVATED), AND INFLUENZA B VIRUS B/MARYLAND/15/2016 BX-69A ANTIGEN (FORMALDEHYDE INACTIVATED) 60; 60; 60; 60 UG/.7ML; UG/.7ML; UG/.7ML; UG/.7ML
INJECTION, SUSPENSION INTRAMUSCULAR
COMMUNITY
Start: 2020-08-25

## 2020-11-03 ASSESSMENT — MIFFLIN-ST. JEOR: SCORE: 1677.72

## 2020-11-03 ASSESSMENT — PAIN SCALES - GENERAL: PAINLEVEL: NO PAIN (0)

## 2020-11-03 NOTE — PATIENT INSTRUCTIONS
It was a pleasure to see you in the cardiology clinic today.    If you have any questions, you can reach my nurse, Loree Nelson, at [phone].  Press Option #1 for the [facility], and then press Option #f 4 or nursing.    Note the new medications: Apixaban 5 mg twice a day    Change the following medications: Reduce Aspirin from adult (325 mg) to baby aspirin (81 mg) a day    Stop the following medications: None    The results from today include: None    Tests ordered today: None    I would like you to follow up with Dr. Aguilera in one year.    Sincerely,      Chris Aguilera MD     [facility]

## 2020-11-03 NOTE — LETTER
11/3/2020      RE: Jonathon Guerra  [address]   [place] [address]       Dear Colleague,    Thank you for the opportunity to participate in the care of your patient, Jonathon Guerra, at the [facility] HEART [facility] ER at [facility]. Please see a copy of my visit note below.    [facility] CARDIOLOGY FOLLOW UP VISIT:    Referring Provider:  Jorge Luis Westbrook  Primary Care Provider:   Perez Beckett  Indication for Consultation:  CAD, pAF, establish care    HPI: Jonathon Guerra is a 75 year old male being seen today for evaluation of CAD and pAF.   The patient's risk factor profile is: (-) HTN, (-) DM, (+) hypercholesterolemia, (+)  prior tobacco use, (-) fam Hx premature CAD.    In 2006, Mr. Guerra had a coronary CT (unclear reason why) which demonstrated mild-moderate diffuse CAD which was nonocclusive. He was asymptomatic at this time and started on goal directed medical therapy. He has been a patient of Dr. Westbrook but is looking to establish care closer to home. He has had multiple stress tests since this time, which have shown above-average exercise tolerance and have been negative for ischemia. In 2009, he developed atrial fibrillation. He had multiple failed cardioversions. He was started on sotalol and his AF has been under good control since. He was recommended to start anticoagulation, however has been resistant to this and has been on 325 mg of aspirin as protection. His HLD has been managed with statin therapy and most recently with LDL 49, HDL 60, and .    Mr. Guerra states he feels well.  He denies any symptoms of angina, SOB, orthopnea, PND, pedal edema.  He denies palpitations, lightheadedness, and syncope.    He is compliant with medications and has no side-effects.    [place] He is a retired . Former smoker, but quit >20 years ago.  The patient is relaxing, taking daily walks, and  has started learning Sami.      PAST MEDICAL HISTORY:  Past Medical History:   Diagnosis Date     Atrial fibrillation (H)      Coronary artery disease      Hyperlipidemia      Hypertension      Thyroid disease        CURRENT MEDICATIONS:  Current Outpatient Medications   Medication Sig     Acetaminophen 650 MG TABS Take by mouth daily     aspirin 325 MG tablet Take 325 mg by mouth daily.     cholecalciferol (VITAMIN D-1000 MAX ST) 25 MCG (1000 UT) TABS Take 2 tablets by mouth daily     Coenzyme Q10-Vitamin E (QUNOL ULTRA COQ10 PO)      Diphenhydramine-APAP, sleep, (ACETAMINOPHEN PM PO) Take 500 mg by mouth     levothyroxine (SYNTHROID/LEVOTHROID) 88 MCG tablet Take 1 tablet (88 mcg) by mouth daily     Melatonin 10 MG TABS tablet Take 10 mg by mouth     metoprolol succinate ER (TOPROL-XL) 25 MG 24 hr tablet Take 0.5 tablets (12.5 mg) by mouth daily 1/2 tablet daily     Multiple Vitamin (MULTI-VITAMIN PO) Take by mouth daily      rosuvastatin (CRESTOR) 40 MG tablet Take 1 tablet (40 mg) by mouth daily     saw palmetto 450 MG CAPS capsule      sotalol (BETAPACE) 80 MG tablet TAKE 1 TABLET BY MOUTH TWICE A DAY     triamcinolone (KENALOG) 0.1 % external cream APPLY TO AFFECTED AREA 3 TIMES A DAY UNTIL SKIN CLEARS     vitamin C w/GUERLINE HIPS 1000 MG tablet      No current facility-administered medications for this visit.        PAST SURGICAL HISTORY:  Past Surgical History:   Procedure Laterality Date     HERNIA REPAIR, INGUINAL RT/LT       OTHER SURGICAL HISTORY      upper and lower partial plate       ALLERGIES  Atorvastatin    FAMILY HX:  Family History   Problem Relation Age of Onset     Cancer Father         prostate     Cancer Brother      Other - See Comments Mother         old age     Diabetes No family hx of      Glaucoma No family hx of      Macular Degeneration No family hx of      Hypertension No family hx of        SOCIAL HX:  Social History     Socioeconomic History     Marital status: Single     Spouse  name: Not on file     Number of children: Not on file     Years of education: Not on file     Highest education level: Not on file   Occupational History     Not on file   Social Needs     Financial resource strain: Not on file     Food insecurity:     Worry: Not on file     Inability: Not on file     Transportation needs:     Medical: Not on file     Non-medical: Not on file   Tobacco Use     Smoking status: Former Smoker     Start date: 1966     Last attempt to quit: 1992     Years since quittin.9     Smokeless tobacco: Never Used   Substance and Sexual Activity     Alcohol use: No     Drug use: No     Sexual activity: Yes     Partners: Male   Lifestyle     Physical activity:     Days per week: Not on file     Minutes per session: Not on file     Stress: Not on file   Relationships     Social connections:     Talks on phone: Not on file     Gets together: Not on file     Attends Sikhism service: Not on file     Active member of club or organization: Not on file     Attends meetings of clubs or organizations: Not on file     Relationship status: Not on file     Intimate partner violence:     Fear of current or ex partner: Not on file     Emotionally abused: Not on file     Physically abused: Not on file     Forced sexual activity: Not on file   Other Topics Concern     Parent/sibling w/ CABG, MI or angioplasty before 65F 55M? No      Service Not Asked     Blood Transfusions Not Asked     Caffeine Concern No     Occupational Exposure Not Asked     Hobby Hazards Not Asked     Sleep Concern No     Stress Concern Not Asked     Weight Concern Not Asked     Special Diet No     Back Care Not Asked     Exercise Yes     Comment: running or walking      Bike Helmet Not Asked     Seat Belt No     Self-Exams Not Asked   Social History Narrative    --------------------------------------------------------------------------------     ROS:  Answers for HPI/ROS submitted by the patient on 10/20/2020  "  General Symptoms: No  Skin Symptoms: No  HENT Symptoms: Yes  EYE SYMPTOMS: Yes  HEART SYMPTOMS: No  LUNG SYMPTOMS: No  INTESTINAL SYMPTOMS: No  URINARY SYMPTOMS: Yes  REPRODUCTIVE SYMPTOMS: No  SKELETAL SYMPTOMS: No  BLOOD SYMPTOMS: No  NERVOUS SYSTEM SYMPTOMS: No  MENTAL HEALTH SYMPTOMS: No  Ear pain: No  Ear discharge: No  Hearing loss: No  Tinnitus: Yes  Nosebleeds: No  Congestion: No  Sinus pain: No  Trouble swallowing: No   Voice hoarseness: No  Mouth sores: No  Sore throat: No  Tooth pain: No  Gum tenderness: No  Bleeding gums: No  Change in taste: No  Change in sense of smell: No  Dry mouth: No  Hearing aid used: No  Neck lump: No  Eye pain: No  Vision loss: No  Dry eyes: Yes  Watery eyes: Yes  Eye bulging: No  Double vision: No  Flashing of lights: No  Spots: No  Floaters: Yes  Redness: No  Crossed eyes: No  Tunnel Vision: No  Yellowing of eyes: No  Eye irritation: Yes  Trouble holding urine or incontinence: Yes  Pain or burning: No  Trouble starting or stopping: Yes  Increased frequency of urination: No  Blood in urine: No  Decreased frequency of urination: No  Frequent nighttime urination: No  Flank pain: No  Difficulty emptying bladder: Yes    VITAL SIGNS:  /86 (BP Location: Right arm, Patient Position: Sitting, Cuff Size: Adult Regular)   Pulse 81   Ht 1.715 m (5' 7.5\")   Wt 96.6 kg (213 lb)   SpO2 95%   BMI 32.87 kg/m    Body mass index is 32.87 kg/m .  Wt Readings from Last 2 Encounters:   01/03/20 96.6 kg (213 lb)   11/05/19 93.4 kg (206 lb)       PHYSICAL EXAM  Jonathon Guerra is a 75 year old male.in no acute distress.  HEENT: Eyes Nonicteric.  Neck: JVP 2cm H20.  Carotids +2 bilaterally without bruits.  Lungs: CTA.  Cor: RRR. Normal S1 and S2.  No murmur, rub, or gallop.  PMI in Lf 5th ICS.  Abd: Soft, nontender, nondistended.  NABS.  No pulsatile mass.  Extremities: No C/C/E.  Pulses +3/3 symmetric in upper and lower extremities.  Neuro: Grossly intact.  Psych: A&O x 3.  Skin: No " rash.    LABS  Recent Labs   Lab Test 10/30/20  0905 10/28/14  1511   WBC 6.5 8.0   HGB 13.8 14.6   HCT 41.4 43.6    223     Recent Labs   Lab Test 10/30/20  0905 10/21/19  1054    139   POTASSIUM 4.4 4.8   CHLORIDE 107 107   CO2 30 25   BUN 16 25   CR 0.90 0.77   GFRESTIMATED 84 >90     Recent Labs   Lab Test 10/30/20  0905 11/05/19  0720 05/12/15  0753 05/12/15  0753 06/11/14  0900   CHOL 118 135   < > 149 148   HDL 59 60   < > 53 62   LDL 49 49   < > 80 73   TRIG 52 130   < > 81 64   CHOLHDLRATIO  --   --   --  2.8 2.4    < > = values in this interval not displayed.         EKG:  10/28/13  NSR. No evidence of ischemia    ECHO 10/30/18:  Interpretation Summary  1. Above average exercise capacity, target HR achieved.  2. The patient exhibited no chest pain during exercise.  3. This was a normal stress EKG with no evidence of stress-induced ischemia.  4. Rest echo: Normal left ventricular function and wall motion at rest. The  visual ejection fraction is estimated at 55-60%.  5. Stress echo: This was a normal stress echocardiogram with no evidence of  stress-induced ischemia. The visual ejection fraction is estimated at 65-70%.     There is moderate (2+) tricuspid regurgitation. The right ventricular systolic  pressure is approximated at 32mmHg plus the right atrial pressure.     Stress echo from 8/2017 was normal, there was 1-2+ TR, RVSP 25mmHg.    Stress Echo 8/17/17  Interpretation Summary  This was a normal stress echocardiogram with no evidence of stress-induced  ischemia.  The patient exercised 10:50 minutes and exhibited no chest pain during  exercise. There was a maximum 1.0mm ST segment depression in the lateral lead  (s).  The post-stress imaging was performed at low heart rates as the heart rate  fell rapidly posst-exercise decreasing the sensitivity for ischemia. The LV  size decreases minimally post-exercise despite an increase in LV function -  direct comparison to the 2 prior stress  echo's indicates a similar appearance.    Stress Echo 6/23/16  Interpretation Summary     This was a normal stress echocardiogram with no evidence of stress-induced  ischemia at a high workload.  There is moderate (2+) tricuspid regurgitation, an increase from prior echo  Reports.    Stress Echo 5/12/15  Interpretation Summary  THIS IS A NORMAL STRESS ECHO AND STRESS EKG.  There is no comparison study available.  Definity contrast was used without apparent complications.  The results of the stress echocardiogram were conveyed to the patient today.  There is mild to moderate (1-2+) tricuspid regurgitation. The right   ventricular systolic pressure is approximated at 33mmHg plus the right atrial   pressure. Right ventricular systolic pressure is elevated, consistent with   mild pulmonary hypertension.  PatientHeight: 67 in  PatientWeight: 202 lbs  SystolicPressure: 98 mmHg  DiastolicPressure: 72 mmHg  HeartRate: 62 bpm  BSA 2.0 m^2     NM Exercise Stress Test 4/25/14  Impression  1.  Myocardial perfusion imaging using single isotope technique  demonstrated very small areas of ischemia at the lateral base and  inferolateral apex.   2. Gated images demonstrated normal wall motion thickening.  The left  ventricular systolic function is 56% at rest, 55% post stress.  3. Compared to the prior study from 11/12/2009, perfusion is normal as  well .      ABDOMINAL US (11/7/19): No AAA.      ASSESSMENT AND PLAN:   Mr. Jonathon Guerra is a 74 yo gentleman with a history of mild to moderate nonocclusive CAD without symptoms of angina. He has had multiple stress tests since his CT findings of CAD, which have all been largely negative. Given his absence of symptoms, I do not feel that repeat stress testing is indicated at this time and provided reassurance. His risk factors, including HLD and tobacco use have been adequately addressed and continuing a healthy lifestyle was actively enforced.    1. Non-obstructive CAD  - Continue high  intensity statin  - continue to encourage healthy lifestyle modifications, including regular exercise  - given absence of symptoms, routine stress testing is not advised, reassurance given    2. Paroxysmal atrial fibrillation  - Per guidelines, [id number] score of 2, he should be offered oral anticoagulation.  At age 77, his score will be 3 and I would push harder.  - I prefer starting a NoAC, such as apixaban, however patient is resistant to this despite explaining increased stroke risk, even while on sotalol therapy  - Continue  mg daily  - If patient is able to afford Apixaban 5 BID, reduce ASA to 81 mg every day.    FOLLOW UP:  1 year      Chris Aguilera MD     Cardiovascular Division    CC  Patient Care Team:  Perez Beckett MD as PCP - General (Family Practice)  Chris Aguilera MD as MD (Interventional Cardiology)  Chris Aguilera MD as Assigned Heart and Vascular Provider  Rafy Reddy DO as Assigned Musculoskeletal Provider  ePrez Beckett MD as Assigned PCP  GEORGE GUERRERO      Please do not hesitate to contact me if you have any questions/concerns.     Sincerely,     Chris Aguilera MD

## 2020-11-03 NOTE — NURSING NOTE
Chief Complaint   Patient presents with     Follow Up     self referred for CAD, atrial fibrillation, hyperlipidemia, hypertension        Vitals were taken and medications were reconciled.     Lauryn Kang  3:52 PM

## 2020-11-03 NOTE — PROGRESS NOTES
[facility] CARDIOLOGY FOLLOW UP VISIT:    Referring Provider:  Jorge Luis Westbrook  Primary Care Provider:   Preez Beckett  Indication for Consultation:  CAD, pAF, establish care    HPI: Jonathon Guerra is a 75 year old male being seen today for evaluation of CAD and pAF.   The patient's risk factor profile is: (-) HTN, (-) DM, (+) hypercholesterolemia, (+)  prior tobacco use, (-) fam Hx premature CAD.    In 2006, Mr. Guerra had a coronary CT (unclear reason why) which demonstrated mild-moderate diffuse CAD which was nonocclusive. He was asymptomatic at this time and started on goal directed medical therapy. He has been a patient of Dr. Westbrook but is looking to establish care closer to home. He has had multiple stress tests since this time, which have shown above-average exercise tolerance and have been negative for ischemia. In 2009, he developed atrial fibrillation. He had multiple failed cardioversions. He was started on sotalol and his AF has been under good control since. He was recommended to start anticoagulation, however has been resistant to this and has been on 325 mg of aspirin as protection. His HLD has been managed with statin therapy and most recently with LDL 49, HDL 60, and .    Mr. Guerra states he feels well.  He denies any symptoms of angina, SOB, orthopnea, PND, pedal edema.  He denies palpitations, lightheadedness, and syncope.    He is compliant with medications and has no side-effects.    Mr. Guerra lives in [facility]. He is a retired . Former smoker, but quit >20 years ago.  The patient is relaxing, taking daily walks, and has started learning Swazi.      PAST MEDICAL HISTORY:  Past Medical History:   Diagnosis Date     Atrial fibrillation (H)      Coronary artery disease      Hyperlipidemia      Hypertension      Thyroid disease        CURRENT MEDICATIONS:  Current Outpatient Medications   Medication Sig     Acetaminophen 650 MG TABS Take by mouth daily     aspirin  325 MG tablet Take 325 mg by mouth daily.     cholecalciferol (VITAMIN D-1000 MAX ST) 25 MCG (1000 UT) TABS Take 2 tablets by mouth daily     Coenzyme Q10-Vitamin E (QUNOL ULTRA COQ10 PO)      Diphenhydramine-APAP, sleep, (ACETAMINOPHEN PM PO) Take 500 mg by mouth     levothyroxine (SYNTHROID/LEVOTHROID) 88 MCG tablet Take 1 tablet (88 mcg) by mouth daily     Melatonin 10 MG TABS tablet Take 10 mg by mouth     metoprolol succinate ER (TOPROL-XL) 25 MG 24 hr tablet Take 0.5 tablets (12.5 mg) by mouth daily 1/2 tablet daily     Multiple Vitamin (MULTI-VITAMIN PO) Take by mouth daily      rosuvastatin (CRESTOR) 40 MG tablet Take 1 tablet (40 mg) by mouth daily     saw palmetto 450 MG CAPS capsule      sotalol (BETAPACE) 80 MG tablet TAKE 1 TABLET BY MOUTH TWICE A DAY     triamcinolone (KENALOG) 0.1 % external cream APPLY TO AFFECTED AREA 3 TIMES A DAY UNTIL SKIN CLEARS     vitamin C w/GUERLINE HIPS 1000 MG tablet      No current facility-administered medications for this visit.        PAST SURGICAL HISTORY:  Past Surgical History:   Procedure Laterality Date     HERNIA REPAIR, INGUINAL RT/LT       OTHER SURGICAL HISTORY      upper and lower partial plate       ALLERGIES  Atorvastatin    FAMILY HX:  Family History   Problem Relation Age of Onset     Cancer Father         prostate     Cancer Brother      Other - See Comments Mother         old age     Diabetes No family hx of      Glaucoma No family hx of      Macular Degeneration No family hx of      Hypertension No family hx of        SOCIAL HX:  Social History     Socioeconomic History     Marital status: Single     Spouse name: Not on file     Number of children: Not on file     Years of education: Not on file     Highest education level: Not on file   Occupational History     Not on file   Social Needs     Financial resource strain: Not on file     Food insecurity:     Worry: Not on file     Inability: Not on file     Transportation needs:     Medical: Not on file      Non-medical: Not on file   Tobacco Use     Smoking status: Former Smoker     Start date: 1966     Last attempt to quit: 1992     Years since quittin.9     Smokeless tobacco: Never Used   Substance and Sexual Activity     Alcohol use: No     Drug use: No     Sexual activity: Yes     Partners: Male   Lifestyle     Physical activity:     Days per week: Not on file     Minutes per session: Not on file     Stress: Not on file   Relationships     Social connections:     Talks on phone: Not on file     Gets together: Not on file     Attends [employer] service: Not on file     Active member of club or organization: Not on file     Attends meetings of clubs or organizations: Not on file     Relationship status: Not on file     Intimate partner violence:     Fear of current or ex partner: Not on file     Emotionally abused: Not on file     Physically abused: Not on file     Forced sexual activity: Not on file   Other Topics Concern     Parent/sibling w/ CABG, MI or angioplasty before 65F 55M? No      Service Not Asked     Blood Transfusions Not Asked     Caffeine Concern No     Occupational Exposure Not Asked     Hobby Hazards Not Asked     Sleep Concern No     Stress Concern Not Asked     Weight Concern Not Asked     Special Diet No     Back Care Not Asked     Exercise Yes     Comment: running or walking      Bike Helmet Not Asked     Seat Belt No     Self-Exams Not Asked   Social History Narrative    --------------------------------------------------------------------------------     ROS:  Answers for HPI/ROS submitted by the patient on 10/20/2020   General Symptoms: No  Skin Symptoms: No  HENT Symptoms: Yes  EYE SYMPTOMS: Yes  HEART SYMPTOMS: No  LUNG SYMPTOMS: No  INTESTINAL SYMPTOMS: No  URINARY SYMPTOMS: Yes  REPRODUCTIVE SYMPTOMS: No  SKELETAL SYMPTOMS: No  BLOOD SYMPTOMS: No  NERVOUS SYSTEM SYMPTOMS: No  MENTAL HEALTH SYMPTOMS: No  Ear pain: No  Ear discharge: No  Hearing loss: No  Tinnitus:  "Yes  Nosebleeds: No  Congestion: No  Sinus pain: No  Trouble swallowing: No   Voice hoarseness: No  Mouth sores: No  Sore throat: No  Tooth pain: No  Gum tenderness: No  Bleeding gums: No  Change in taste: No  Change in sense of smell: No  Dry mouth: No  Hearing aid used: No  Neck lump: No  Eye pain: No  Vision loss: No  Dry eyes: Yes  Watery eyes: Yes  Eye bulging: No  Double vision: No  Flashing of lights: No  Spots: No  Floaters: Yes  Redness: No  Crossed eyes: No  Tunnel Vision: No  Yellowing of eyes: No  Eye irritation: Yes  Trouble holding urine or incontinence: Yes  Pain or burning: No  Trouble starting or stopping: Yes  Increased frequency of urination: No  Blood in urine: No  Decreased frequency of urination: No  Frequent nighttime urination: No  Flank pain: No  Difficulty emptying bladder: Yes    VITAL SIGNS:  /86 (BP Location: Right arm, Patient Position: Sitting, Cuff Size: Adult Regular)   Pulse 81   Ht 1.715 m (5' 7.5\")   Wt 96.6 kg (213 lb)   SpO2 95%   BMI 32.87 kg/m    Body mass index is 32.87 kg/m .  Wt Readings from Last 2 Encounters:   01/03/20 96.6 kg (213 lb)   11/05/19 93.4 kg (206 lb)       PHYSICAL EXAM  Jonathon Guerra is a 75 year old male.in no acute distress.  HEENT: Eyes Nonicteric.  Neck: JVP 2cm H20.  Carotids +2 bilaterally without bruits.  Lungs: CTA.  Cor: RRR. Normal S1 and S2.  No murmur, rub, or gallop.  PMI in Lf 5th ICS.  Abd: Soft, nontender, nondistended.  NABS.  No pulsatile mass.  Extremities: No C/C/E.  Pulses +3/3 symmetric in upper and lower extremities.  Neuro: Grossly intact.  Psych: A&O x 3.  Skin: No rash.    LABS  Recent Labs   Lab Test 10/30/20  0905 10/28/14  1511   WBC 6.5 8.0   HGB 13.8 14.6   HCT 41.4 43.6    223     Recent Labs   Lab Test 10/30/20  0905 10/21/19  1054    139   POTASSIUM 4.4 4.8   CHLORIDE 107 107   CO2 30 25   BUN 16 25   CR 0.90 0.77   GFRESTIMATED 84 >90     Recent Labs   Lab Test 10/30/20  0905 11/05/19  0720 " 05/12/15  0753 05/12/15  0753 06/11/14  0900   CHOL 118 135   < > 149 148   HDL 59 60   < > 53 62   LDL 49 49   < > 80 73   TRIG 52 130   < > 81 64   CHOLHDLRATIO  --   --   --  2.8 2.4    < > = values in this interval not displayed.         EKG:  10/28/13  NSR. No evidence of ischemia    ECHO 10/30/18:  Interpretation Summary  1. Above average exercise capacity, target HR achieved.  2. The patient exhibited no chest pain during exercise.  3. This was a normal stress EKG with no evidence of stress-induced ischemia.  4. Rest echo: Normal left ventricular function and wall motion at rest. The  visual ejection fraction is estimated at 55-60%.  5. Stress echo: This was a normal stress echocardiogram with no evidence of  stress-induced ischemia. The visual ejection fraction is estimated at 65-70%.     There is moderate (2+) tricuspid regurgitation. The right ventricular systolic  pressure is approximated at 32mmHg plus the right atrial pressure.     Stress echo from 8/2017 was normal, there was 1-2+ TR, RVSP 25mmHg.    Stress Echo 8/17/17  Interpretation Summary  This was a normal stress echocardiogram with no evidence of stress-induced  ischemia.  The patient exercised 10:50 minutes and exhibited no chest pain during  exercise. There was a maximum 1.0mm ST segment depression in the lateral lead  (s).  The post-stress imaging was performed at low heart rates as the heart rate  fell rapidly posst-exercise decreasing the sensitivity for ischemia. The LV  size decreases minimally post-exercise despite an increase in LV function -  direct comparison to the 2 prior stress echo's indicates a similar appearance.    Stress Echo 6/23/16  Interpretation Summary     This was a normal stress echocardiogram with no evidence of stress-induced  ischemia at a high workload.  There is moderate (2+) tricuspid regurgitation, an increase from prior echo  Reports.    Stress Echo 5/12/15  Interpretation Summary  THIS IS A NORMAL STRESS ECHO  AND STRESS EKG.  There is no comparison study available.  Definity contrast was used without apparent complications.  The results of the stress echocardiogram were conveyed to the patient today.  There is mild to moderate (1-2+) tricuspid regurgitation. The right   ventricular systolic pressure is approximated at 33mmHg plus the right atrial   pressure. Right ventricular systolic pressure is elevated, consistent with   mild pulmonary hypertension.  PatientHeight: 67 in  PatientWeight: 202 lbs  SystolicPressure: 98 mmHg  DiastolicPressure: 72 mmHg  HeartRate: 62 bpm  BSA 2.0 m^2     NM Exercise Stress Test 4/25/14  Impression  1.  Myocardial perfusion imaging using single isotope technique  demonstrated very small areas of ischemia at the lateral base and  inferolateral apex.   2. Gated images demonstrated normal wall motion thickening.  The left  ventricular systolic function is 56% at rest, 55% post stress.  3. Compared to the prior study from 11/12/2009, perfusion is normal as  well .      ABDOMINAL US (11/7/19): No AAA.      ASSESSMENT AND PLAN:   Mr. Jonathon Guerra is a 76 yo gentleman with a history of mild to moderate nonocclusive CAD without symptoms of angina. He has had multiple stress tests since his CT findings of CAD, which have all been largely negative. Given his absence of symptoms, I do not feel that repeat stress testing is indicated at this time and provided reassurance. His risk factors, including HLD and tobacco use have been adequately addressed and continuing a healthy lifestyle was actively enforced.    1. Non-obstructive CAD  - Continue high intensity statin  - continue to encourage healthy lifestyle modifications, including regular exercise  - given absence of symptoms, routine stress testing is not advised, reassurance given    2. Paroxysmal atrial fibrillation  - Per guidelines, [id number] score of 2, he should be offered oral anticoagulation.  At age 77, his score will be 3 and I would push  harder.  - I prefer starting a NoAC, such as apixaban, however patient is resistant to this despite explaining increased stroke risk, even while on sotalol therapy  - Continue  mg daily  - If patient is able to afford Apixaban 5 BID, reduce ASA to 81 mg every day.    FOLLOW UP:  1 year      Chris Aguilera MD     Cardiovascular Division    CC  Patient Care Team:  Perez Beckett MD as PCP - General (Family Practice)  Chris Aguilera MD as MD (Interventional Cardiology)  Chris Aguilera MD as Assigned Heart and Vascular Provider  Rafy Reddy DO as Assigned Musculoskeletal Provider  Perez Beckett MD as Assigned PCP  GEORGE GUERRERO

## 2020-11-05 ENCOUNTER — TELEPHONE (OUTPATIENT)
Dept: CARDIOLOGY | Facility: CLINIC | Age: 75
End: 2020-11-05

## 2020-11-05 NOTE — TELEPHONE ENCOUNTER
Patient will start Eliquis in January, cost is $375.00 but every prescription after that will be less.

## 2020-11-05 NOTE — TELEPHONE ENCOUNTER
[facility] Call Center    Phone Message    May a detailed message be left on voicemail: no     Reason for Call: Other: Calling Loree Back- Please call [phone]     Action Taken: Message routed to:  [facility] Cardiology    Travel Screening: Not Applicable

## 2020-11-05 NOTE — TELEPHONE ENCOUNTER
[facility] Call Center    Phone Message    May a detailed message be left on voicemail: no     Reason for Call: Medication Question or concern regarding medication   Prescription Clarification  Name of Medication: apixaban ANTICOAGULANT (ELIQUIS) 5 MG tablet  Prescribing Provider: Dr. Aguilera   Pharmacy: [facility] in [employer], [place], [address]   What on the order needs clarification? Pt says his out of pocket cost for this med this year is $370, so he is going to wait until Jan 1 when his insurance changes so it is less expensive. Pt requests call from Loree LOBATO to discuss.          Action Taken: Message routed to:  [facility]ARDIOLOGY ADULT [facility]    Travel Screening: Not Applicable

## 2020-11-09 ENCOUNTER — TELEPHONE (OUTPATIENT)
Dept: INTERNAL MEDICINE | Facility: CLINIC | Age: 75
End: 2020-11-09
Payer: COMMERCIAL

## 2020-11-09 NOTE — TELEPHONE ENCOUNTER
Pt referred form Cardiology.   Maryellen Garcia RN 9:56 AM on 11/9/2020.  Left message for pt to call back.  Maryellen Garcia RN 10:01 AM on 11/9/2020.

## 2020-12-21 DIAGNOSIS — I25.10 CORONARY ARTERY DISEASE INVOLVING NATIVE CORONARY ARTERY OF NATIVE HEART WITHOUT ANGINA PECTORIS: ICD-10-CM

## 2020-12-22 RX ORDER — SOTALOL HYDROCHLORIDE 80 MG/1
TABLET ORAL
Qty: 180 TABLET | Refills: 1 | Status: SHIPPED | OUTPATIENT
Start: 2020-12-22 | End: 2021-06-10

## 2021-01-04 ENCOUNTER — TELEPHONE (OUTPATIENT)
Dept: CARDIOLOGY | Facility: CLINIC | Age: 76
End: 2021-01-04

## 2021-01-04 NOTE — TELEPHONE ENCOUNTER
[facility] Call Center    Phone Message    May a detailed message be left on voicemail: yes     Reason for Call: Other: Pt wanted to update the care team that he picked up his medication today and is starting it today     Action Taken: Message routed to:  [facility] Cardio    Travel Screening: Not Applicable

## 2021-01-06 ASSESSMENT — ENCOUNTER SYMPTOMS
EYE REDNESS: 0
STIFFNESS: 0
EYE IRRITATION: 0
SINUS CONGESTION: 0
SORE THROAT: 0
SKIN CHANGES: 0
TASTE DISTURBANCE: 0
POOR WOUND HEALING: 0
HOARSE VOICE: 0
EYE PAIN: 0
FLANK PAIN: 0
JOINT SWELLING: 0
SMELL DISTURBANCE: 0
ARTHRALGIAS: 0
TROUBLE SWALLOWING: 0
MUSCLE WEAKNESS: 0
NECK PAIN: 0
BACK PAIN: 0
DIFFICULTY URINATING: 0
NECK MASS: 0
MYALGIAS: 1
EYE WATERING: 1
NAIL CHANGES: 0
DYSURIA: 0
MUSCLE CRAMPS: 0
HEMATURIA: 0
DOUBLE VISION: 0
SINUS PAIN: 0

## 2021-01-06 ASSESSMENT — ACTIVITIES OF DAILY LIVING (ADL)
IN_THE_PAST_7_DAYS,_DID_YOU_NEED_HELP_FROM_OTHERS_TO_PERFORM_EVERYDAY_ACTIVITIES_SUCH_AS_EATING,_GETTING_DRESSED,_GROOMING,_BATHING,_WALKING,_OR_USING_THE_TOILET: N
IN_THE_PAST_7_DAYS,_DID_YOU_NEED_HELP_FROM_OTHERS_TO_TAKE_CARE_OF_THINGS_SUCH_AS_LAUNDRY_AND_HOUSEKEEPING,_BANKING,_SHOPPING,_USING_THE_TELEPHONE,_FOOD_PREPARATION,_TRANSPORTATION,_OR_TAKING_YOUR_OWN_MEDICATIONS?: N

## 2021-01-12 ENCOUNTER — OFFICE VISIT (OUTPATIENT)
Dept: INTERNAL MEDICINE | Facility: CLINIC | Age: 76
End: 2021-01-12
Payer: COMMERCIAL

## 2021-01-12 VITALS
WEIGHT: 209 LBS | SYSTOLIC BLOOD PRESSURE: 128 MMHG | OXYGEN SATURATION: 94 % | BODY MASS INDEX: 32.25 KG/M2 | HEART RATE: 71 BPM | DIASTOLIC BLOOD PRESSURE: 88 MMHG

## 2021-01-12 DIAGNOSIS — I10 BENIGN ESSENTIAL HYPERTENSION: ICD-10-CM

## 2021-01-12 DIAGNOSIS — R94.6 ABNORMAL FINDING ON THYROID FUNCTION TEST: ICD-10-CM

## 2021-01-12 DIAGNOSIS — Z11.59 ENCOUNTER FOR SCREENING FOR OTHER VIRAL DISEASES: ICD-10-CM

## 2021-01-12 DIAGNOSIS — R97.20 ELEVATED PROSTATE SPECIFIC ANTIGEN (PSA): Primary | ICD-10-CM

## 2021-01-12 DIAGNOSIS — Z12.5 ENCOUNTER FOR SCREENING FOR MALIGNANT NEOPLASM OF PROSTATE: ICD-10-CM

## 2021-01-12 DIAGNOSIS — R97.20 ELEVATED PROSTATE SPECIFIC ANTIGEN (PSA): ICD-10-CM

## 2021-01-12 LAB
ALBUMIN SERPL-MCNC: 3.9 G/DL (ref 3.4–5)
ALP SERPL-CCNC: 46 U/L (ref 40–150)
ALT SERPL W P-5'-P-CCNC: 27 U/L (ref 0–70)
ANION GAP SERPL CALCULATED.3IONS-SCNC: 2 MMOL/L (ref 3–14)
AST SERPL W P-5'-P-CCNC: 18 U/L (ref 0–45)
BASOPHILS # BLD AUTO: 0 10E9/L (ref 0–0.2)
BASOPHILS NFR BLD AUTO: 0.4 %
BILIRUB SERPL-MCNC: 0.3 MG/DL (ref 0.2–1.3)
BUN SERPL-MCNC: 20 MG/DL (ref 7–30)
CALCIUM SERPL-MCNC: 9.1 MG/DL (ref 8.5–10.1)
CHLORIDE SERPL-SCNC: 108 MMOL/L (ref 94–109)
CO2 SERPL-SCNC: 32 MMOL/L (ref 20–32)
CREAT SERPL-MCNC: 0.96 MG/DL (ref 0.66–1.25)
DIFFERENTIAL METHOD BLD: NORMAL
EOSINOPHIL # BLD AUTO: 0.1 10E9/L (ref 0–0.7)
EOSINOPHIL NFR BLD AUTO: 2.2 %
ERYTHROCYTE [DISTWIDTH] IN BLOOD BY AUTOMATED COUNT: 12.8 % (ref 10–15)
GFR SERPL CREATININE-BSD FRML MDRD: 78 ML/MIN/{1.73_M2}
GLUCOSE SERPL-MCNC: 97 MG/DL (ref 70–99)
HBV SURFACE AB SERPL IA-ACNC: 19.6 M[IU]/ML
HCT VFR BLD AUTO: 42.3 % (ref 40–53)
HGB BLD-MCNC: 13.7 G/DL (ref 13.3–17.7)
IMM GRANULOCYTES # BLD: 0 10E9/L (ref 0–0.4)
IMM GRANULOCYTES NFR BLD: 0.4 %
LYMPHOCYTES # BLD AUTO: 2.1 10E9/L (ref 0.8–5.3)
LYMPHOCYTES NFR BLD AUTO: 37.1 %
MCH RBC QN AUTO: 30.8 PG (ref 26.5–33)
MCHC RBC AUTO-ENTMCNC: 32.4 G/DL (ref 31.5–36.5)
MCV RBC AUTO: 95 FL (ref 78–100)
MONOCYTES # BLD AUTO: 0.7 10E9/L (ref 0–1.3)
MONOCYTES NFR BLD AUTO: 11.7 %
NEUTROPHILS # BLD AUTO: 2.7 10E9/L (ref 1.6–8.3)
NEUTROPHILS NFR BLD AUTO: 48.2 %
NRBC # BLD AUTO: 0 10*3/UL
NRBC BLD AUTO-RTO: 0 /100
PLATELET # BLD AUTO: 155 10E9/L (ref 150–450)
POTASSIUM SERPL-SCNC: 4 MMOL/L (ref 3.4–5.3)
PROT SERPL-MCNC: 7.3 G/DL (ref 6.8–8.8)
PSA SERPL-ACNC: 3.03 UG/L (ref 0–4)
RBC # BLD AUTO: 4.45 10E12/L (ref 4.4–5.9)
SODIUM SERPL-SCNC: 141 MMOL/L (ref 133–144)
TSH SERPL DL<=0.005 MIU/L-ACNC: 2 MU/L (ref 0.4–4)
WBC # BLD AUTO: 5.6 10E9/L (ref 4–11)

## 2021-01-12 PROCEDURE — 86706 HEP B SURFACE ANTIBODY: CPT | Mod: 90 | Performed by: PATHOLOGY

## 2021-01-12 PROCEDURE — G0103 PSA SCREENING: HCPCS | Performed by: PATHOLOGY

## 2021-01-12 PROCEDURE — 36415 COLL VENOUS BLD VENIPUNCTURE: CPT | Performed by: PATHOLOGY

## 2021-01-12 PROCEDURE — 99387 INIT PM E/M NEW PAT 65+ YRS: CPT | Performed by: INTERNAL MEDICINE

## 2021-01-12 PROCEDURE — 80053 COMPREHEN METABOLIC PANEL: CPT | Performed by: PATHOLOGY

## 2021-01-12 PROCEDURE — 85025 COMPLETE CBC W/AUTO DIFF WBC: CPT | Performed by: PATHOLOGY

## 2021-01-12 PROCEDURE — 84443 ASSAY THYROID STIM HORMONE: CPT | Performed by: PATHOLOGY

## 2021-01-12 RX ORDER — GINSENG 100 MG
CAPSULE ORAL
COMMUNITY
Start: 2020-02-01 | End: 2021-07-12

## 2021-01-12 ASSESSMENT — PAIN SCALES - GENERAL: PAINLEVEL: NO PAIN (0)

## 2021-01-12 NOTE — PROGRESS NOTES
HPI:    Pt. Comes into establish care and physical today. Overall he is doing well. He is a retired . He used to run marathon's. He does not smoke nor abuse EtOH. He tries to stay active. No new HEENT, cardiopulmonary, abdominal, , neurological, systemic, psychiatric, lymphatic, endocrine, vascular complaints.       Past Medical History:   Diagnosis Date     Atrial fibrillation (H)      Coronary artery disease      Hyperlipidemia      Hypertension      Thyroid disease      Past Surgical History:   Procedure Laterality Date     HERNIA REPAIR, INGUINAL RT/LT       OTHER SURGICAL HISTORY      upper and lower partial plate     PE:    Vitals noted, gen, nad, cooperative, alert. He is wearing a mask, neck supple nl rom, lungs with good air movement, No B carotid bruits, RRR, S1, S2, no MRG, abdomen, no acute findings. Grossly normal neurological exam.     A/P:    1. Afib on Eliquis, Sotolol.  See Cardiology note from Dr. Aguilera 11/3/2020 for additional details.  He also has h/o CAD on ASA   2. On thyroid replacement; TSH 10/21/2019 1.41  and ordered today  3. Increased lipids on Crestor; 11/5/2019 LDL 49 and HDL 60  4. Immunizations; Influenza vaccination done 8/25/2020.  Shingrix series done 10/3/2019  5. Elevated PSA; PSA 1/3/2020 was 2.94 and ordered today  6. Dermatology; seen over the summer   7. AAA screen U/S 11/8/2019; normal.   8. Colonoscopy 7/1/2020  9. Two doses of Hep B and will check Hep B antibody to surface antigen to assess for immunity.

## 2021-01-12 NOTE — NURSING NOTE
Chief Complaint   Patient presents with     Establish Care     pt would like to establish care today      Physical     pt would like physical exam today      FLAKO Soto at 7:45 AM sign on 1/12/2021

## 2021-02-15 DIAGNOSIS — E03.9 ACQUIRED HYPOTHYROIDISM: ICD-10-CM

## 2021-02-15 DIAGNOSIS — I48.20 CHRONIC ATRIAL FIBRILLATION (H): ICD-10-CM

## 2021-02-17 RX ORDER — LEVOTHYROXINE SODIUM 88 UG/1
88 TABLET ORAL DAILY
Qty: 90 TABLET | Refills: 1 | Status: SHIPPED | OUTPATIENT
Start: 2021-02-17 | End: 2021-07-06

## 2021-02-17 RX ORDER — METOPROLOL SUCCINATE 25 MG/1
12.5 TABLET, EXTENDED RELEASE ORAL DAILY
Qty: 45 TABLET | Refills: 1 | Status: SHIPPED | OUTPATIENT
Start: 2021-02-17 | End: 2021-07-06

## 2021-03-05 ENCOUNTER — MYC MEDICAL ADVICE (OUTPATIENT)
Dept: INTERNAL MEDICINE | Facility: CLINIC | Age: 76
End: 2021-03-05

## 2021-03-12 ENCOUNTER — TELEPHONE (OUTPATIENT)
Dept: INTERNAL MEDICINE | Facility: CLINIC | Age: 76
End: 2021-03-12
Payer: COMMERCIAL

## 2021-03-12 DIAGNOSIS — E78.00 HYPERCHOLESTEROLEMIA: ICD-10-CM

## 2021-03-12 RX ORDER — ROSUVASTATIN CALCIUM 40 MG/1
40 TABLET, COATED ORAL DAILY
Qty: 90 TABLET | Refills: 2 | Status: SHIPPED | OUTPATIENT
Start: 2021-03-12 | End: 2021-10-28

## 2021-06-08 DIAGNOSIS — I25.10 CORONARY ARTERY DISEASE INVOLVING NATIVE CORONARY ARTERY OF NATIVE HEART WITHOUT ANGINA PECTORIS: ICD-10-CM

## 2021-06-08 NOTE — TELEPHONE ENCOUNTER
Routing to PCP for review and refill as appropriate.     Sotalol   Prescription approved per [facility] Refill Protocol.

## 2021-06-10 RX ORDER — SOTALOL HYDROCHLORIDE 80 MG/1
TABLET ORAL
Qty: 180 TABLET | Refills: 1 | Status: SHIPPED | OUTPATIENT
Start: 2021-06-10 | End: 2021-07-12

## 2021-07-01 DIAGNOSIS — I48.20 CHRONIC ATRIAL FIBRILLATION (H): ICD-10-CM

## 2021-07-01 DIAGNOSIS — E03.9 ACQUIRED HYPOTHYROIDISM: ICD-10-CM

## 2021-07-06 RX ORDER — METOPROLOL SUCCINATE 25 MG/1
12.5 TABLET, EXTENDED RELEASE ORAL DAILY
Qty: 45 TABLET | Refills: 1 | Status: SHIPPED | OUTPATIENT
Start: 2021-07-06 | End: 2021-10-28

## 2021-07-06 RX ORDER — LEVOTHYROXINE SODIUM 88 UG/1
88 TABLET ORAL DAILY
Qty: 90 TABLET | Refills: 1 | Status: SHIPPED | OUTPATIENT
Start: 2021-07-06 | End: 2021-07-12

## 2021-07-12 ENCOUNTER — OFFICE VISIT (OUTPATIENT)
Dept: INTERNAL MEDICINE | Facility: CLINIC | Age: 76
End: 2021-07-12
Payer: COMMERCIAL

## 2021-07-12 VITALS
OXYGEN SATURATION: 97 % | SYSTOLIC BLOOD PRESSURE: 119 MMHG | BODY MASS INDEX: 27.93 KG/M2 | WEIGHT: 181 LBS | DIASTOLIC BLOOD PRESSURE: 76 MMHG | HEART RATE: 57 BPM

## 2021-07-12 DIAGNOSIS — E03.9 ACQUIRED HYPOTHYROIDISM: ICD-10-CM

## 2021-07-12 DIAGNOSIS — I25.10 CORONARY ARTERY DISEASE INVOLVING NATIVE CORONARY ARTERY OF NATIVE HEART WITHOUT ANGINA PECTORIS: ICD-10-CM

## 2021-07-12 PROCEDURE — 99214 OFFICE O/P EST MOD 30 MIN: CPT | Performed by: INTERNAL MEDICINE

## 2021-07-12 RX ORDER — LEVOTHYROXINE SODIUM 88 UG/1
88 TABLET ORAL DAILY
Qty: 90 TABLET | Refills: 1 | Status: SHIPPED | OUTPATIENT
Start: 2021-07-12 | End: 2021-08-23

## 2021-07-12 RX ORDER — SOTALOL HYDROCHLORIDE 80 MG/1
80 TABLET ORAL 2 TIMES DAILY
Qty: 180 TABLET | Refills: 3 | Status: SHIPPED | OUTPATIENT
Start: 2021-07-12 | End: 2022-10-11

## 2021-07-12 NOTE — PROGRESS NOTES
HPI:      Last visit with me 1/12/2021 in person. He is doing well. He is walking 6-7 miles about 5 days a week. He has lost weight. He is running. He states old hernia surgery R groin and has some occ. Pain. Otherwise, no other HEENT, cardiopulmonary, abdominal, , neurological, systemic, psychiatric, lymphatic, endocrine complaints.       Past Medical History:   Diagnosis Date     Atrial fibrillation (H)      Coronary artery disease      Hyperlipidemia      Hypertension      Thyroid disease      Past Surgical History:   Procedure Laterality Date     HERNIA REPAIR, INGUINAL RT/LT       OTHER SURGICAL HISTORY      upper and lower partial plate     PE:    Vitals noted, gen, nad, cooperative, alert, neck supple nl rom, lungs with good air movement, RRR, RRR, S1, S2, no MRG, abdomen, no acute findings. R surgical scar inguinal area. Grossly normal neurological exam.     A/P:    1. Immunizations; He has completed the Moderna COVID vaccine series. Hep B antibody positive on 1/12/2021 and he has been given the Hep B immunization series.   2. On thyroid replacement; TSH normal at 2.00 on 1/12/2021  3. PSA checked 1/12/2021 normal at 3.03  4. Colonoscopy 7/1/2020   5. Future lipid panel ordered by Dr. Aguilera, Cardiology 11/3/2020. He is on high dose 40 mg Crestor.   6. Cardiology; CAD/afib (on Eliquis and Sotolol); he was seen 11/3/2020, Cardiology, Dr. Aguilera. Refilled his Sotalol today  7. He states he sees Dermatology this summer for skin check  8. Previous R hernia surgery (about 10 years ago at [facility]). I recommended if worse to get an U/S and see Dr. Carrillo, General Surgery. He wants to wait on this and he will let me know if worse. He states he did have mesh during this surgery.       30 minutes spent on the date of the encounter doing chart review, history and exam, documentation and further activities as noted above

## 2021-07-12 NOTE — PATIENT INSTRUCTIONS
[facility] Medication Refill Request Information:  * Please contact your pharmacy regarding ANY request for medication refills.  ** [facility] Prescription Fax = [phone]  * Please allow 3 business days for routine medication refills.  * Please allow 5 business days for controlled substance medication refills.     [facility] Test Result notification information:  *You will be notified with in 7-10 days of your appointment day regarding the results of your test.  If you are on MyChart you will be notified as soon as the provider has reviewed the results and signed off on them.    [facility]: [phone]

## 2021-07-12 NOTE — NURSING NOTE
Chief Complaint   Patient presents with     RECHECK     6 month follow up.      Augusta Blount LPN at 7:51 AM on 7/12/2021.

## 2021-08-18 DIAGNOSIS — E78.00 HYPERCHOLESTEROLEMIA: ICD-10-CM

## 2021-08-20 RX ORDER — ROSUVASTATIN CALCIUM 40 MG/1
TABLET, COATED ORAL
Qty: 90 TABLET | Refills: 2 | OUTPATIENT
Start: 2021-08-20

## 2021-08-23 ENCOUNTER — TELEPHONE (OUTPATIENT)
Dept: INTERNAL MEDICINE | Facility: CLINIC | Age: 76
End: 2021-08-23

## 2021-08-23 DIAGNOSIS — E03.9 ACQUIRED HYPOTHYROIDISM: ICD-10-CM

## 2021-08-23 RX ORDER — LEVOTHYROXINE SODIUM 88 UG/1
88 TABLET ORAL DAILY
Qty: 90 TABLET | Refills: 1 | Status: SHIPPED | OUTPATIENT
Start: 2021-08-23 | End: 2021-11-15

## 2021-09-02 NOTE — PROGRESS NOTES
HPI:  Patient presents for annual eye exam. There is some blur at near. Mild glare at night. There is watering in both eyes. He has been using similasan which as belladona      Pertinent Medical History:    Vertigo    Obstructive sleep apnea    Hypothyroidism    Hyperlipidemia    Hypercholesteremia    Atrial fibrillation    Coronary artery disease    Abdominal aortic aneurysm    Ocular History:    Cataracts, both eyes.    Myopia, both eyes.     Family history of macular degeneration - aunt and cousins.     Eye Medications:    Similasan - has belladona    Assessment and Plan:  1.   Cataract, both eyes    Mildly visually significant - mild near blur and glare. Recommend more lighting when reading. Given vision is 20/20 in both eyes and cataracts appears mild on exam, okay to continue monitoring.     2.   Myopia/Astigmatism, both eyes    Increase ADD.    3.   Mild Macular Pigmentary Changes, left eye.     Seen on retro-illumination.     Family history of AMD.     Baseline macular OCT done today 09/13/2021.     Recommend annual dilated eye exam with macular OCT.     4.   Posterior Vitreous Detachment, both eyes. Retinas attached.     Educated on signs and symptoms of a retinal detachment (ie. Hundreds of floaters, flashes of light, and shadow/curtain over the vision) to be seen immediately.     5.   Dry Eye Syndrome, both eyes.     Discontinue similasan - contains belladona.    Start preservative free artificial tears 4 times daily both eyes.         Patient consented to a dilated eye exam:    Yes. Side effects discussed.    Medical History:  Past Medical History:   Diagnosis Date     Atrial fibrillation (H)      Coronary artery disease      Hyperlipidemia      Hypertension      Thyroid disease        Medications:  Current Outpatient Medications   Medication Sig Dispense Refill     Acetaminophen 650 MG TABS Take by mouth daily       apixaban ANTICOAGULANT (ELIQUIS) 5 MG tablet Take 1 tablet (5 mg) by mouth 2 times daily  180 tablet 3     aspirin (ASA) 81 MG EC tablet Take 1 tablet (81 mg) by mouth daily 90 tablet 3     cholecalciferol (VITAMIN D-1000 MAX ST) 25 MCG (1000 UT) TABS Take 2 tablets by mouth daily 180 tablet 3     Coenzyme Q10-Vitamin E (QUNOL ULTRA COQ10 PO)        Diphenhydramine-APAP, sleep, (ACETAMINOPHEN PM PO) Take 500 mg by mouth       FLUZONE HIGH-DOSE QUADRIVALENT 0.7 ML LAVON injection Pt had flu shot 8/25       levothyroxine (SYNTHROID/LEVOTHROID) 88 MCG tablet Take 1 tablet (88 mcg) by mouth daily 90 tablet 1     Melatonin 10 MG TABS tablet Take 10 mg by mouth       metoprolol succinate ER (TOPROL-XL) 25 MG 24 hr tablet Take 0.5 tablets (12.5 mg) by mouth daily 45 tablet 1     Multiple Vitamin (MULTI-VITAMIN PO) Take by mouth daily        rosuvastatin (CRESTOR) 40 MG tablet Take 1 tablet (40 mg) by mouth daily 90 tablet 2     saw palmetto 450 MG CAPS capsule        sotalol (BETAPACE) 80 MG tablet Take 1 tablet (80 mg) by mouth 2 times daily 180 tablet 3     triamcinolone (KENALOG) 0.1 % external cream APPLY TO AFFECTED AREA 3 TIMES A DAY UNTIL SKIN CLEARS 45 g 1   Complete documentation of historical and exam elements from today's encounter can be found in the full encounter summary report (not reduplicated in this progress note). I personally obtained the chief complaint(s) and history of present illness.  I confirmed and edited as necessary the review of systems, past medical/surgical history, family history, social history, and examination findings as documented by others; and I examined the patient myself. I personally reviewed the relevant tests, images, and reports as documented above. I formulated and edited as necessary the assessment and plan and discussed the findings and management plan with the patient and family. - Renzo Blakely OD

## 2021-09-05 ENCOUNTER — HEALTH MAINTENANCE LETTER (OUTPATIENT)
Age: 76
End: 2021-09-05

## 2021-09-13 ENCOUNTER — OFFICE VISIT (OUTPATIENT)
Dept: OPHTHALMOLOGY | Facility: CLINIC | Age: 76
End: 2021-09-13
Attending: OPTOMETRIST
Payer: COMMERCIAL

## 2021-09-13 DIAGNOSIS — H25.13 NUCLEAR SENILE CATARACT OF BOTH EYES: ICD-10-CM

## 2021-09-13 DIAGNOSIS — H52.13 MYOPIA WITH ASTIGMATISM AND PRESBYOPIA, BILATERAL: ICD-10-CM

## 2021-09-13 DIAGNOSIS — H35.89 RPE MOTTLING OF MACULA: Primary | ICD-10-CM

## 2021-09-13 DIAGNOSIS — H52.203 MYOPIA WITH ASTIGMATISM AND PRESBYOPIA, BILATERAL: ICD-10-CM

## 2021-09-13 DIAGNOSIS — H52.4 MYOPIA WITH ASTIGMATISM AND PRESBYOPIA, BILATERAL: ICD-10-CM

## 2021-09-13 DIAGNOSIS — H43.813 VITREOUS DETACHMENT OF BOTH EYES: ICD-10-CM

## 2021-09-13 DIAGNOSIS — H04.123 DRY EYE SYNDROME OF BOTH EYES: ICD-10-CM

## 2021-09-13 PROCEDURE — 92004 COMPRE OPH EXAM NEW PT 1/>: CPT | Performed by: OPTOMETRIST

## 2021-09-13 PROCEDURE — 92134 CPTRZ OPH DX IMG PST SGM RTA: CPT | Performed by: OPTOMETRIST

## 2021-09-13 PROCEDURE — G0463 HOSPITAL OUTPT CLINIC VISIT: HCPCS | Mod: 25

## 2021-09-13 PROCEDURE — 92015 DETERMINE REFRACTIVE STATE: CPT | Mod: GY

## 2021-09-13 ASSESSMENT — REFRACTION_MANIFEST
OD_AXIS: 180
OS_ADD: +2.50
OD_ADD: +2.50
OD_CYLINDER: +0.75
OS_SPHERE: -1.25
OS_CYLINDER: +1.00
OS_AXIS: 006
OD_SPHERE: -0.75

## 2021-09-13 ASSESSMENT — TONOMETRY
OS_IOP_MMHG: 13
IOP_METHOD: TONOPEN
OD_IOP_MMHG: 10

## 2021-09-13 ASSESSMENT — VISUAL ACUITY
METHOD_MR: PT REQUESTS TODAY
CORRECTION_TYPE: GLASSES
METHOD: SNELLEN - LINEAR
OD_CC+: -2
OD_CC: 20/20
OS_CC: 20/25
OS_CC+: -2/+2

## 2021-09-13 ASSESSMENT — REFRACTION_WEARINGRX
OS_SPHERE: -1.25
OD_SPHERE: -0.50
SPECS_TYPE: PAL
OS_CYLINDER: +1.50
OD_ADD: +2.25
OS_AXIS: 008
OS_ADD: +2.25
OD_AXIS: 178
OD_CYLINDER: +1.00

## 2021-09-13 ASSESSMENT — SLIT LAMP EXAM - LIDS
COMMENTS: NORMAL
COMMENTS: NORMAL

## 2021-09-13 ASSESSMENT — EXTERNAL EXAM - LEFT EYE: OS_EXAM: NORMAL

## 2021-09-13 ASSESSMENT — CONF VISUAL FIELD
OS_NORMAL: 1
OD_NORMAL: 1

## 2021-09-13 ASSESSMENT — EXTERNAL EXAM - RIGHT EYE: OD_EXAM: NORMAL

## 2021-09-13 NOTE — NURSING NOTE
Chief Complaints and History of Present Illnesses   Patient presents with     Follow Up     Annual eye exam     Chief Complaint(s) and History of Present Illness(es)     Follow Up     Laterality: both eyes    Associated symptoms: floaters (occasionally noted).  Negative for flashes, eye pain and dryness    Pain scale: 0/10    Comments: Annual eye exam              Comments     Pt updated glasses after last visit - no change in vision since then  Pt still has some issues with doing crossword puzzle - up close vision seems to be blurred; does it first thing in the AM after putting drops in the eyes (pt uses Similasan Dry Eye Relief in the AM - wonders if these are okay to use)    KARINA Harrison 9:41 AM September 13, 2021

## 2021-10-20 ENCOUNTER — OFFICE VISIT (OUTPATIENT)
Dept: DERMATOLOGY | Facility: CLINIC | Age: 76
End: 2021-10-20
Payer: COMMERCIAL

## 2021-10-20 DIAGNOSIS — B35.1 ONYCHOMYCOSIS: ICD-10-CM

## 2021-10-20 DIAGNOSIS — Z12.83 SKIN CANCER SCREENING: Primary | ICD-10-CM

## 2021-10-20 DIAGNOSIS — L30.9 ECZEMA, UNSPECIFIED TYPE: ICD-10-CM

## 2021-10-20 DIAGNOSIS — B35.3 TINEA PEDIS OF BOTH FEET: ICD-10-CM

## 2021-10-20 PROCEDURE — 99203 OFFICE O/P NEW LOW 30 MIN: CPT | Performed by: PHYSICIAN ASSISTANT

## 2021-10-20 RX ORDER — KETOCONAZOLE 20 MG/G
CREAM TOPICAL
Qty: 60 G | Refills: 3 | Status: SHIPPED | OUTPATIENT
Start: 2021-10-20

## 2021-10-20 RX ORDER — TRIAMCINOLONE ACETONIDE 1 MG/G
CREAM TOPICAL
Qty: 45 G | Refills: 1 | Status: SHIPPED | OUTPATIENT
Start: 2021-10-20

## 2021-10-20 RX ORDER — KETOCONAZOLE 20 MG/G
CREAM TOPICAL
COMMUNITY
Start: 2020-11-24 | End: 2021-10-20

## 2021-10-20 ASSESSMENT — PAIN SCALES - GENERAL: PAINLEVEL: NO PAIN (0)

## 2021-10-20 NOTE — LETTER
10/20/2021       RE: Jonathon Guerra  [address]   [facility] [address]     Dear Colleague,    Thank you for referring your patient, Jonathon Guerra, to the [facility] [place] at [facility]. Please see a copy of my visit note below.    [facility] Dermatology Note  Encounter Date: Oct 20, 2021  Office Visit     Dermatology Problem List:  FBSE 10/20/2021  #. Tinea pedis with onychomycosis  - current tx: ketoconazole 1% cream  #. 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 Tinea pedis with onychomycosis  - Continue ketoconazole 1% cream. Apply to the bottoms of the feet and in between toes and toenails x1 months and the PRN.  - Recommended OTC treatments such as vinegar soaks, tea tree oil, or Vicks vapor rub     #. 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 History of eruption on lower legs and posterior scalp, Ddx dermatitis vs. Plaque psoriasis. Resolved with triamcinolone 0.1% cream. Will refill this if needed.     #. Benign lesions: Seborrheic keratoses. Explained to patient benign nature of lesion. No treatment is necessary at this time unless the lesion changes or becomes symptomatic.   - ABCDs of melanoma were discussed and self skin checks were advised.  - Sun precaution was advised including the use of sun screens of SPF 30 or higher, sun protective clothing, and avoidance of tanning beds.      Procedures Performed:   None    Follow-up: 1 year(s) in-person, or earlier for new or changing lesions    Staff and Scribe:     Scribe Disclosure:  Leonila SALAS, am serving as a scribe to document services personally performed by Danielle Hernandez PA-C based on data collection and the provider's statements to me.     Provider Disclosure:   The documentation recorded  by the scribe accurately reflects the services I personally performed and the decisions made by me.    All risks, benefits and alternatives were discussed with patient.  Patient is in agreement and understands the assessment and plan.  All questions were answered.  Sun Screen Education was given.   Return to Clinic annually or sooner as needed.   Danielle Hernandez PA-C   [facility]     ____________________________________________    CC: Skin Check (pt states he is here for a full body skin check)      HPI:  Mr. Jonathon Guerra is a(n) 76 year old male who presents today as a new patient for FBSE.    The patient reports an antifungal cream to the rashes on the back of his neck. He also used this cream to the back of his feet and groin. He has been following with an outside dermatologist. The patient self reports he has been treated with cryotherapy to scattered spots on his face. He denies a family history of skin cancer, but notes his niece has psoriasis and his grandfather has asthma    Patient is otherwise feeling well, without additional skin concerns.    Labs Reviewed:  N/A    Physical Exam:  Vitals: There were no vitals taken for this visit.  SKIN: Full skin, which includes the head/face, both arms, chest, back, abdomen,both legs, genitalia and/or groin buttocks, digits and/or nails, was examined.  - Scattered hypopigmented macules to the forearms.  - There are waxy stuck on tan to brown papules on the trunk and extremities.   -There are pink scaly plaques to bilateral feet in a moccasin like distribution and interdigital scale. Toenail plates are yellow,hypertrophic with subungual debris.   - No other lesions of concern on areas examined.     Medications:  Current Outpatient Medications   Medication     Acetaminophen 650 MG TABS     apixaban ANTICOAGULANT (ELIQUIS) 5 MG tablet     aspirin (ASA) 81 MG EC tablet     cholecalciferol (VITAMIN D-1000 MAX ST) 25 MCG (1000 UT) TABS      Coenzyme Q10-Vitamin E (QUNOL ULTRA COQ10 PO)     Diphenhydramine-APAP, sleep, (ACETAMINOPHEN PM PO)     FLUZONE HIGH-DOSE QUADRIVALENT 0.7 ML LAVON injection     ketoconazole (NIZORAL) 2 % external cream     levothyroxine (SYNTHROID/LEVOTHROID) 88 MCG tablet     Melatonin 10 MG TABS tablet     metoprolol succinate ER (TOPROL-XL) 25 MG 24 hr tablet     Multiple Vitamin (MULTI-VITAMIN PO)     rosuvastatin (CRESTOR) 40 MG tablet     saw palmetto 450 MG CAPS capsule     sotalol (BETAPACE) 80 MG tablet     triamcinolone (KENALOG) 0.1 % external cream     No current facility-administered medications for this visit.        Past Medical History:   Patient Active Problem List   Diagnosis     Elevated prostate specific antigen (PSA)     Hypothyroidism     Atrial fibrillation (H)     Insomnia     Gastroesophageal reflux disease without esophagitis     Hyperlipidemia     Impotence of organic origin     Urticaria     Arthropathy, lower leg     Advance Care Planning     Hyperlipidemia     Coronary artery disease     Tinea corporis     Pure hypercholesterolemia     SUKH (obstructive sleep apnea)     Benign paroxysmal positional vertigo, right     Sciatica without back pain, left     Abdominal aortic aneurysm (AAA) without rupture (H)     Past Medical History:   Diagnosis Date     Atrial fibrillation (H)      Coronary artery disease      Hyperlipidemia      Hypertension      Thyroid disease         CC Referred Self, MD  No address on file on close of this encounter.

## 2021-10-20 NOTE — PROGRESS NOTES
[facility] Dermatology Note  Encounter Date: Oct 20, 2021  Office Visit     Dermatology Problem List:  FBSE 10/20/2021  #. Tinea pedis with onychomycosis  - current tx: ketoconazole 1% cream  #. 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 Tinea pedis with onychomycosis  - Continue ketoconazole 1% cream. Apply to the bottoms of the feet and in between toes and toenails x1 months and the PRN.  - Recommended OTC treatments such as vinegar soaks, tea tree oil, or Vicks vapor rub     #. 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 History of eruption on lower legs and posterior scalp, Ddx dermatitis vs. Plaque psoriasis. Resolved with triamcinolone 0.1% cream. Will refill this if needed.     #. Benign lesions: Seborrheic keratoses. Explained to patient benign nature of lesion. No treatment is necessary at this time unless the lesion changes or becomes symptomatic.   - ABCDs of melanoma were discussed and self skin checks were advised.  - Sun precaution was advised including the use of sun screens of SPF 30 or higher, sun protective clothing, and avoidance of tanning beds.      Procedures Performed:   None    Follow-up: 1 year(s) in-person, or earlier for new or changing lesions    Staff and Scribe:     Scribe Disclosure:  I, Leonila Spear, am serving as a scribe to document services personally performed by Danielle Hernandez PA-C based on data collection and the provider's statements to me.     Provider Disclosure:   The documentation recorded by the scribe accurately reflects the services I personally performed and the decisions made by me.    All risks, benefits and alternatives were discussed with patient.  Patient is in agreement and understands the assessment and plan.  All questions were answered.  Sun Screen Education was given.   Return to Clinic annually or sooner  as needed.   Danielle Hernandez PA-C   [facility]ermat[facility]     ____________________________________________    CC: Skin Check (pt states he is here for a full body skin check)      HPI:  Mr. Jonathon Guerra is a(n) 76 year old male who presents today as a new patient for FBSE.    The patient reports an antifungal cream to the rashes on the back of his neck. He also used this cream to the back of his feet and groin. He has been following with an outside dermatologist. The patient self reports he has been treated with cryotherapy to scattered spots on his face. He denies a family history of skin cancer, but notes his niece has psoriasis and his grandfather has asthma    Patient is otherwise feeling well, without additional skin concerns.    Labs Reviewed:  N/A    Physical Exam:  Vitals: There were no vitals taken for this visit.  SKIN: Full skin, which includes the head/face, both arms, chest, back, abdomen,both legs, genitalia and/or groin buttocks, digits and/or nails, was examined.  - Scattered hypopigmented macules to the forearms.  - There are waxy stuck on tan to brown papules on the trunk and extremities.   -There are pink scaly plaques to bilateral feet in a moccasin like distribution and interdigital scale. Toenail plates are yellow,hypertrophic with subungual debris.   - No other lesions of concern on areas examined.     Medications:  Current Outpatient Medications   Medication     Acetaminophen 650 MG TABS     apixaban ANTICOAGULANT (ELIQUIS) 5 MG tablet     aspirin (ASA) 81 MG EC tablet     cholecalciferol (VITAMIN D-1000 MAX ST) 25 MCG (1000 UT) TABS     Coenzyme Q10-Vitamin E (QUNOL ULTRA COQ10 PO)     Diphenhydramine-APAP, sleep, (ACETAMINOPHEN PM PO)     FLUZONE HIGH-DOSE QUADRIVALENT 0.7 ML LAVON injection     ketoconazole (NIZORAL) 2 % external cream     levothyroxine (SYNTHROID/LEVOTHROID) 88 MCG tablet     Melatonin 10 MG TABS tablet     metoprolol succinate ER (TOPROL-XL) 25  MG 24 hr tablet     Multiple Vitamin (MULTI-VITAMIN PO)     rosuvastatin (CRESTOR) 40 MG tablet     saw palmetto 450 MG CAPS capsule     sotalol (BETAPACE) 80 MG tablet     triamcinolone (KENALOG) 0.1 % external cream     No current facility-administered medications for this visit.        Past Medical History:   Patient Active Problem List   Diagnosis     Elevated prostate specific antigen (PSA)     Hypothyroidism     Atrial fibrillation (H)     Insomnia     Gastroesophageal reflux disease without esophagitis     Hyperlipidemia     Impotence of organic origin     Urticaria     Arthropathy, lower leg     Advance Care Planning     Hyperlipidemia     Coronary artery disease     Tinea corporis     Pure hypercholesterolemia     SUKH (obstructive sleep apnea)     Benign paroxysmal positional vertigo, right     Sciatica without back pain, left     Abdominal aortic aneurysm (AAA) without rupture (H)     Past Medical History:   Diagnosis Date     Atrial fibrillation (H)      Coronary artery disease      Hyperlipidemia      Hypertension      Thyroid disease         CC Referred Self, MD  No address on file on close of this encounter.

## 2021-10-20 NOTE — PATIENT INSTRUCTIONS
Start vinegar soaks at least once daily, for 10-15 minutes. Soak feet in 50 % vinegar and 50% water.

## 2021-10-23 ASSESSMENT — ENCOUNTER SYMPTOMS
DOUBLE VISION: 0
TROUBLE SWALLOWING: 0
TASTE DISTURBANCE: 0
HEMATURIA: 0
EYE REDNESS: 1
EYE WATERING: 1
DIFFICULTY URINATING: 0
SMELL DISTURBANCE: 0
FLANK PAIN: 0
HOARSE VOICE: 0
NECK MASS: 0
DYSURIA: 0
SORE THROAT: 0
EYE IRRITATION: 1
SINUS CONGESTION: 0
EYE PAIN: 0
SINUS PAIN: 0

## 2021-10-25 ENCOUNTER — LAB (OUTPATIENT)
Dept: LAB | Facility: CLINIC | Age: 76
End: 2021-10-25
Payer: COMMERCIAL

## 2021-10-25 DIAGNOSIS — I25.10 ATHEROSCLEROSIS OF NATIVE CORONARY ARTERY OF NATIVE HEART WITHOUT ANGINA PECTORIS: ICD-10-CM

## 2021-10-25 LAB
CHOLEST SERPL-MCNC: 142 MG/DL
FASTING STATUS PATIENT QL REPORTED: YES
HDLC SERPL-MCNC: 73 MG/DL
LDLC SERPL CALC-MCNC: 53 MG/DL
NONHDLC SERPL-MCNC: 69 MG/DL
TRIGL SERPL-MCNC: 80 MG/DL

## 2021-10-25 PROCEDURE — 36415 COLL VENOUS BLD VENIPUNCTURE: CPT | Performed by: PATHOLOGY

## 2021-10-25 PROCEDURE — 80061 LIPID PANEL: CPT | Performed by: PATHOLOGY

## 2021-10-27 NOTE — PROGRESS NOTES
Cardiology Clinic Note      HPI: Jonathon Guerra is a 76 year old male with PMHx of HLD, pafib, prior tobacco use who presents to cardiology clinic for follow up. Last seen by Dr. Aguilera 11/2020 at which time patient was feeling well, and Apixaban was discussed for [id number] 2 and started in January of this year. His ASA was decreased to 81 mg daily at that time.      In review, in 2006, Mr. Guerra had a coronary CT (unclear reason why) which demonstrated mild-moderate diffuse CAD which was nonocclusive. He was asymptomatic at this time and started on goal directed medical therapy. He has had multiple stress tests since this time, which have shown above-average exercise tolerance and have been negative for ischemia. In 2009, he developed atrial fibrillation. He had multiple failed cardioversions. He was started on sotalol and his AF has been under good control since. He was recommended to start anticoagulation, however has been resistant to this and has been on 325 mg of aspirin as protection.     Since his last visit, he has been feeling great. He has been very active, walking 8-11 miles daily without symptoms. His weight has been stable. He eats healthfully. He no longer smokes (quit > 20 years ago) and is feeling quite well. He does have SUKH, which is controlled by sleeping on his side (per his SUKH clinic) He denies chest pain, SOB, LE swelling, orthopnea. No other concerns voiced.         Current Outpatient Medications   Medication Sig Dispense Refill     Acetaminophen 650 MG TABS Take by mouth daily       apixaban ANTICOAGULANT (ELIQUIS) 5 MG tablet Take 1 tablet (5 mg) by mouth 2 times daily 180 tablet 3     aspirin (ASA) 81 MG EC tablet Take 1 tablet (81 mg) by mouth daily 90 tablet 3     cholecalciferol (VITAMIN D-1000 MAX ST) 25 MCG (1000 UT) TABS Take 2 tablets by mouth daily 180 tablet 3     Coenzyme Q10-Vitamin E (QUNOL ULTRA COQ10 PO)        Diphenhydramine-APAP, sleep, (ACETAMINOPHEN PM PO)  Take 500 mg by mouth       FLUZONE HIGH-DOSE QUADRIVALENT 0.7 ML LAVON injection Pt had flu shot        ketoconazole (NIZORAL) 2 % external cream Apply to the feet and inbetween the toes daily x 1 month then as needed. 60 g 3     levothyroxine (SYNTHROID/LEVOTHROID) 88 MCG tablet Take 1 tablet (88 mcg) by mouth daily 90 tablet 1     Melatonin 10 MG TABS tablet Take 10 mg by mouth       metoprolol succinate ER (TOPROL-XL) 25 MG 24 hr tablet Take 0.5 tablets (12.5 mg) by mouth daily 45 tablet 1     Multiple Vitamin (MULTI-VITAMIN PO) Take by mouth daily        rosuvastatin (CRESTOR) 40 MG tablet Take 1 tablet (40 mg) by mouth daily 90 tablet 2     saw palmetto 450 MG CAPS capsule        sotalol (BETAPACE) 80 MG tablet Take 1 tablet (80 mg) by mouth 2 times daily 180 tablet 3     triamcinolone (KENALOG) 0.1 % external cream APPLY TO AFFECTED AREA on scalp and lower legs as needed twice daily until rashes clear 45 g 1       Past Medical History:   Diagnosis Date     Atrial fibrillation (H)      Coronary artery disease      Hyperlipidemia      Hypertension      Thyroid disease        Past Surgical History:   Procedure Laterality Date     HERNIA REPAIR, INGUINAL RT/LT       OTHER SURGICAL HISTORY      upper and lower partial plate       Family History   Problem Relation Age of Onset     Cancer Father         prostate     Cancer Brother      Other - See Comments Mother         old age     Diabetes No family hx of      Glaucoma No family hx of      Macular Degeneration No family hx of      Hypertension No family hx of        Social History     Tobacco Use     Smoking status: Former Smoker     Start date: 1966     Quit date: 1992     Years since quittin.9     Smokeless tobacco: Never Used   Substance Use Topics     Alcohol use: No       Allergies   Allergen Reactions     Atorvastatin      Lipitor--muscle aches         ROS:   Negative besides that noted in HPI      Physical Examination:  Vitals: /83  "(BP Location: Right arm, Patient Position: Chair, Cuff Size: Adult Regular)   Pulse 67   Ht 1.714 m (5' 7.48\")   Wt 91.9 kg (202 lb 8 oz)   SpO2 96%   BMI 31.27 kg/m    BMI= Body mass index is 31.27 kg/m .    GENERAL APPEARANCE: healthy, alert and no distress  HEENT: no icterus  NECK: JVP is not visible  CHEST: lungs clear to auscultation - no rales, rhonchi or wheezes  CARDIOVASCULAR: regular rhythm, normal S1, S2,   EXTREMITIES: no clubbing, cyanosis or edema  NEURO: alert and oriented to person/place/time, normal speech  VASC: Peripheral pulses are normal in volumes and symmetric bilaterally.   SKIN: no ecchymoses, no rashes    Laboratory/Imaging:  ECHO 10/30/18:  Interpretation Summary  1. Above average exercise capacity, target HR achieved.  2. The patient exhibited no chest pain during exercise.  3. This was a normal stress EKG with no evidence of stress-induced ischemia.  4. Rest echo: Normal left ventricular function and wall motion at rest. The  visual ejection fraction is estimated at 55-60%.  5. Stress echo: This was a normal stress echocardiogram with no evidence of  stress-induced ischemia. The visual ejection fraction is estimated at 65-70%.     There is moderate (2+) tricuspid regurgitation. The right ventricular systolic  pressure is approximated at 32mmHg plus the right atrial pressure.     Stress echo from 8/2017 was normal, there was 1-2+ TR, RVSP 25mmHg.     Stress Echo 8/17/17  Interpretation Summary  This was a normal stress echocardiogram with no evidence of stress-induced  ischemia.  The patient exercised 10:50 minutes and exhibited no chest pain during  exercise. There was a maximum 1.0mm ST segment depression in the lateral lead  (s).  The post-stress imaging was performed at low heart rates as the heart rate  fell rapidly posst-exercise decreasing the sensitivity for ischemia. The LV  size decreases minimally post-exercise despite an increase in LV function -  direct comparison to the " 2 prior stress echo's indicates a similar appearance.     Stress Echo 6/23/16  Interpretation Summary     This was a normal stress echocardiogram with no evidence of stress-induced  ischemia at a high workload.  There is moderate (2+) tricuspid regurgitation, an increase from prior echo  Reports.     Stress Echo 5/12/15  Interpretation Summary  THIS IS A NORMAL STRESS ECHO AND STRESS EKG.  There is no comparison study available.  Definity contrast was used without apparent complications.  The results of the stress echocardiogram were conveyed to the patient today.  There is mild to moderate (1-2+) tricuspid regurgitation. The right   ventricular systolic pressure is approximated at 33mmHg plus the right atrial   pressure. Right ventricular systolic pressure is elevated, consistent with   mild pulmonary hypertension.  PatientHeight: 67 in  PatientWeight: 202 lbs  SystolicPressure: 98 mmHg  DiastolicPressure: 72 mmHg  HeartRate: 62 bpm  BSA 2.0 m^2     NM Exercise Stress Test 4/25/14  Impression  1.  Myocardial perfusion imaging using single isotope technique  demonstrated very small areas of ischemia at the lateral base and  inferolateral apex.   2. Gated images demonstrated normal wall motion thickening.  The left  ventricular systolic function is 56% at rest, 55% post stress.  3. Compared to the prior study from 11/12/2009, perfusion is normal as  well .        ABDOMINAL US (11/7/19): No AAA.     Assessment  Mr. Jonathon Guerra is a 77 yo gentleman with a history of mild to moderate nonocclusive CAD without symptoms of angina. He has had multiple stress tests since his CT findings of CAD, which have all been largely negative. Given his absence of symptoms, I do not feel that repeat stress testing is indicated at this time and provided reassurance. His risk factors, including HLD and tobacco use have been adequately addressed and continuing a healthy lifestyle was actively enforced.     # Non-obstructive CAD  -  Continue high intensity statin  - continue to encourage healthy lifestyle modifications, including regular exercise  - given absence of symptoms, routine stress testing is not advised, reassurance given     # Paroxysmal atrial fibrillation  - On Apixiban for [id number] score of 2, he At age 77, his score will be 3  - Continue Sotalol  - Continue ASA 81 mg daily for now, though did discuss with stable CAD, could consider stopping ASA and continuing DOAC.      # HLD- total cholesterol- 142, HDL 73, LDL 53, trigs 80    Recommendations:  # Follow up one year  # refill heart meds  # Keep up the good work with excellent risk factor modification  # Feel free to call with questions.     MAHTEW Mark, CNP  [facility]  [phone]

## 2021-10-28 ENCOUNTER — OFFICE VISIT (OUTPATIENT)
Dept: CARDIOLOGY | Facility: CLINIC | Age: 76
End: 2021-10-28
Attending: NURSE PRACTITIONER
Payer: COMMERCIAL

## 2021-10-28 VITALS
WEIGHT: 202.5 LBS | SYSTOLIC BLOOD PRESSURE: 129 MMHG | BODY MASS INDEX: 31.78 KG/M2 | HEART RATE: 67 BPM | OXYGEN SATURATION: 96 % | HEIGHT: 67 IN | DIASTOLIC BLOOD PRESSURE: 83 MMHG

## 2021-10-28 DIAGNOSIS — I25.10 ATHEROSCLEROSIS OF NATIVE CORONARY ARTERY OF NATIVE HEART WITHOUT ANGINA PECTORIS: Primary | ICD-10-CM

## 2021-10-28 DIAGNOSIS — E78.00 HYPERCHOLESTEROLEMIA: ICD-10-CM

## 2021-10-28 DIAGNOSIS — I48.0 PAROXYSMAL ATRIAL FIBRILLATION (H): ICD-10-CM

## 2021-10-28 DIAGNOSIS — I48.20 CHRONIC ATRIAL FIBRILLATION (H): ICD-10-CM

## 2021-10-28 PROCEDURE — 93005 ELECTROCARDIOGRAM TRACING: CPT

## 2021-10-28 PROCEDURE — 99213 OFFICE O/P EST LOW 20 MIN: CPT | Performed by: NURSE PRACTITIONER

## 2021-10-28 PROCEDURE — G0463 HOSPITAL OUTPT CLINIC VISIT: HCPCS | Mod: 25

## 2021-10-28 RX ORDER — METOPROLOL SUCCINATE 25 MG/1
12.5 TABLET, EXTENDED RELEASE ORAL DAILY
Qty: 90 TABLET | Refills: 1 | Status: SHIPPED | OUTPATIENT
Start: 2021-10-28 | End: 2022-10-11

## 2021-10-28 RX ORDER — ROSUVASTATIN CALCIUM 40 MG/1
40 TABLET, COATED ORAL DAILY
Qty: 90 TABLET | Refills: 2 | Status: SHIPPED | OUTPATIENT
Start: 2021-10-28 | End: 2022-08-08

## 2021-10-28 ASSESSMENT — PAIN SCALES - GENERAL: PAINLEVEL: NO PAIN (0)

## 2021-10-28 ASSESSMENT — MIFFLIN-ST. JEOR: SCORE: 1624.78

## 2021-10-28 NOTE — LETTER
10/28/2021      RE: Jonathon Guerra  [address]   [facility] [address]       Dear Colleague,    Thank you for the opportunity to participate in the care of your patient, Jonathon Guerra, at the [facility] [place] at [facility]. Please see a copy of my visit note below.          Cardiology Clinic Note      HPI: Jonathon Guerra is a 76 year old male with PMHx of HLD, pafib, prior tobacco use who presents to cardiology clinic for follow up. Last seen by Dr. Aguilera 11/2020 at which time patient was feeling well, and Apixaban was discussed for [id number] 2 and started in January of this year. His ASA was decreased to 81 mg daily at that time.      In review, in 2006, Mr. Guerra had a coronary CT (unclear reason why) which demonstrated mild-moderate diffuse CAD which was nonocclusive. He was asymptomatic at this time and started on goal directed medical therapy. He has had multiple stress tests since this time, which have shown above-average exercise tolerance and have been negative for ischemia. In 2009, he developed atrial fibrillation. He had multiple failed cardioversions. He was started on sotalol and his AF has been under good control since. He was recommended to start anticoagulation, however has been resistant to this and has been on 325 mg of aspirin as protection.     Since his last visit, he has been feeling great. He has been very active, walking 8-11 miles daily without symptoms. His weight has been stable. He eats healthfully. He no longer smokes (quit > 20 years ago) and is feeling quite well. He does have SUKH, which is controlled by sleeping on his side (per his SUKH clinic) He denies chest pain, SOB, LE swelling, orthopnea. No other concerns voiced.         Current Outpatient Medications   Medication Sig Dispense Refill     Acetaminophen 650 MG TABS Take by mouth daily       apixaban ANTICOAGULANT (ELIQUIS) 5 MG tablet  Take 1 tablet (5 mg) by mouth 2 times daily 180 tablet 3     aspirin (ASA) 81 MG EC tablet Take 1 tablet (81 mg) by mouth daily 90 tablet 3     cholecalciferol (VITAMIN D-1000 MAX ST) 25 MCG (1000 UT) TABS Take 2 tablets by mouth daily 180 tablet 3     Coenzyme Q10-Vitamin E (QUNOL ULTRA COQ10 PO)        Diphenhydramine-APAP, sleep, (ACETAMINOPHEN PM PO) Take 500 mg by mouth       FLUZONE HIGH-DOSE QUADRIVALENT 0.7 ML LAVON injection Pt had flu shot 8/25       ketoconazole (NIZORAL) 2 % external cream Apply to the feet and inbetween the toes daily x 1 month then as needed. 60 g 3     levothyroxine (SYNTHROID/LEVOTHROID) 88 MCG tablet Take 1 tablet (88 mcg) by mouth daily 90 tablet 1     Melatonin 10 MG TABS tablet Take 10 mg by mouth       metoprolol succinate ER (TOPROL-XL) 25 MG 24 hr tablet Take 0.5 tablets (12.5 mg) by mouth daily 45 tablet 1     Multiple Vitamin (MULTI-VITAMIN PO) Take by mouth daily        rosuvastatin (CRESTOR) 40 MG tablet Take 1 tablet (40 mg) by mouth daily 90 tablet 2     saw palmetto 450 MG CAPS capsule        sotalol (BETAPACE) 80 MG tablet Take 1 tablet (80 mg) by mouth 2 times daily 180 tablet 3     triamcinolone (KENALOG) 0.1 % external cream APPLY TO AFFECTED AREA on scalp and lower legs as needed twice daily until rashes clear 45 g 1       Past Medical History:   Diagnosis Date     Atrial fibrillation (H)      Coronary artery disease      Hyperlipidemia      Hypertension      Thyroid disease        Past Surgical History:   Procedure Laterality Date     HERNIA REPAIR, INGUINAL RT/LT       OTHER SURGICAL HISTORY      upper and lower partial plate       Family History   Problem Relation Age of Onset     Cancer Father         prostate     Cancer Brother      Other - See Comments Mother         old age     Diabetes No family hx of      Glaucoma No family hx of      Macular Degeneration No family hx of      Hypertension No family hx of        Social History     Tobacco Use     Smoking  "status: Former Smoker     Start date: 1966     Quit date: 1992     Years since quittin.9     Smokeless tobacco: Never Used   Substance Use Topics     Alcohol use: No       Allergies   Allergen Reactions     Atorvastatin      Lipitor--muscle aches         ROS:   Negative besides that noted in HPI      Physical Examination:  Vitals: /83 (BP Location: Right arm, Patient Position: Chair, Cuff Size: Adult Regular)   Pulse 67   Ht 1.714 m (5' 7.48\")   Wt 91.9 kg (202 lb 8 oz)   SpO2 96%   BMI 31.27 kg/m    BMI= Body mass index is 31.27 kg/m .    GENERAL APPEARANCE: healthy, alert and no distress  HEENT: no icterus  NECK: JVP is not visible  CHEST: lungs clear to auscultation - no rales, rhonchi or wheezes  CARDIOVASCULAR: regular rhythm, normal S1, S2,   EXTREMITIES: no clubbing, cyanosis or edema  NEURO: alert and oriented to person/place/time, normal speech  VASC: Peripheral pulses are normal in volumes and symmetric bilaterally.   SKIN: no ecchymoses, no rashes    Laboratory/Imaging:  ECHO 10/30/18:  Interpretation Summary  1. Above average exercise capacity, target HR achieved.  2. The patient exhibited no chest pain during exercise.  3. This was a normal stress EKG with no evidence of stress-induced ischemia.  4. Rest echo: Normal left ventricular function and wall motion at rest. The  visual ejection fraction is estimated at 55-60%.  5. Stress echo: This was a normal stress echocardiogram with no evidence of  stress-induced ischemia. The visual ejection fraction is estimated at 65-70%.     There is moderate (2+) tricuspid regurgitation. The right ventricular systolic  pressure is approximated at 32mmHg plus the right atrial pressure.     Stress echo from 2017 was normal, there was 1-2+ TR, RVSP 25mmHg.     Stress Echo 17  Interpretation Summary  This was a normal stress echocardiogram with no evidence of stress-induced  ischemia.  The patient exercised 10:50 minutes and exhibited no " chest pain during  exercise. There was a maximum 1.0mm ST segment depression in the lateral lead  (s).  The post-stress imaging was performed at low heart rates as the heart rate  fell rapidly posst-exercise decreasing the sensitivity for ischemia. The LV  size decreases minimally post-exercise despite an increase in LV function -  direct comparison to the 2 prior stress echo's indicates a similar appearance.     Stress Echo 6/23/16  Interpretation Summary     This was a normal stress echocardiogram with no evidence of stress-induced  ischemia at a high workload.  There is moderate (2+) tricuspid regurgitation, an increase from prior echo  Reports.     Stress Echo 5/12/15  Interpretation Summary  THIS IS A NORMAL STRESS ECHO AND STRESS EKG.  There is no comparison study available.  Definity contrast was used without apparent complications.  The results of the stress echocardiogram were conveyed to the patient today.  There is mild to moderate (1-2+) tricuspid regurgitation. The right   ventricular systolic pressure is approximated at 33mmHg plus the right atrial   pressure. Right ventricular systolic pressure is elevated, consistent with   mild pulmonary hypertension.  PatientHeight: 67 in  PatientWeight: 202 lbs  SystolicPressure: 98 mmHg  DiastolicPressure: 72 mmHg  HeartRate: 62 bpm  BSA 2.0 m^2     NM Exercise Stress Test 4/25/14  Impression  1.  Myocardial perfusion imaging using single isotope technique  demonstrated very small areas of ischemia at the lateral base and  inferolateral apex.   2. Gated images demonstrated normal wall motion thickening.  The left  ventricular systolic function is 56% at rest, 55% post stress.  3. Compared to the prior study from 11/12/2009, perfusion is normal as  well .        ABDOMINAL US (11/7/19): No AAA.     Assessment  Mr. Jonathon Guerra is a 77 yo gentleman with a history of mild to moderate nonocclusive CAD without symptoms of angina. He has had multiple stress tests since  his CT findings of CAD, which have all been largely negative. Given his absence of symptoms, I do not feel that repeat stress testing is indicated at this time and provided reassurance. His risk factors, including HLD and tobacco use have been adequately addressed and continuing a healthy lifestyle was actively enforced.     # Non-obstructive CAD  - Continue high intensity statin  - continue to encourage healthy lifestyle modifications, including regular exercise  - given absence of symptoms, routine stress testing is not advised, reassurance given     # Paroxysmal atrial fibrillation  - On Apixiban for [id number] score of 2, he At age 77, his score will be 3  - Continue Sotalol  - Continue ASA 81 mg daily for now, though did discuss with stable CAD, could consider stopping ASA and continuing DOAC.      # HLD- total cholesterol- 142, HDL 73, LDL 53, trigs 80    Recommendations:  # Follow up one year  # refill heart meds  # Keep up the good work with excellent risk factor modification  # Feel free to call with questions.     Lisa Velasquez APRN, CNP  [facility]  [phone]

## 2021-10-28 NOTE — NURSING NOTE
Chief Complaint   Patient presents with     Follow Up     self referred for CAD, atrial fibrillation, hyperlipidemia, hypertension      Vitals were taken, medications reconciled, and EKG performed.    Reinaldo Oneill  8:17 AM

## 2021-10-29 LAB
ATRIAL RATE - MUSE: 67 BPM
DIASTOLIC BLOOD PRESSURE - MUSE: NORMAL MMHG
INTERPRETATION ECG - MUSE: NORMAL
P AXIS - MUSE: 46 DEGREES
PR INTERVAL - MUSE: 174 MS
QRS DURATION - MUSE: 80 MS
QT - MUSE: 402 MS
QTC - MUSE: 424 MS
R AXIS - MUSE: 5 DEGREES
SYSTOLIC BLOOD PRESSURE - MUSE: NORMAL MMHG
T AXIS - MUSE: 16 DEGREES
VENTRICULAR RATE- MUSE: 67 BPM

## 2021-11-14 NOTE — PROGRESS NOTES
HPI:    Mr. Guerra comes in for follow up today. Last visit with me 7/12/2021. Overall doing quite well. He continues to exercise w/o problems. He is up to date on his vaccinations. No other HEENT, cardiopulmonary, abdominal, , neurological, systemic, psychiatric, lymphatic, endocrine complaints.     Past Medical History:   Diagnosis Date     Atrial fibrillation (H)      Coronary artery disease      Hyperlipidemia      Hypertension      Thyroid disease      Past Surgical History:   Procedure Laterality Date     HERNIA REPAIR, INGUINAL RT/LT       OTHER SURGICAL HISTORY      upper and lower partial plate     PE:    Vitals noted, gen, nad, cooperative, alert, neck supple nl rom, lungs with good air movement, no B carotid bruits. RRR, S1, S2, no MRG, abdomen, no acute findings. Grossly normal neurological exam.     A/P:    1. Immunizations; 3 doses of Modera COVID vaccine. He has completed the Shingrix vaccination series. Influenza vaccine 9/10/2021  2. Cardiology visit 10/28/2021; h/o afib on Apixaban and Sotalol  And Metoprolol   3. On thyroid replacement; TSH 1/12/2021 was 2.0  4. Increased lipids on Crestor; 10/25/2021 LDL 53 and HDL 73  5. PSA 1/12/2021 was high normal at 3.03  6. Dermatology skin check 10/20/2021  7. Colonoscopy 7/1/2020    30 minutes spent on the date of the encounter doing chart review, history and exam, documentation and further activities as noted above    I will see him 4/2022 for follow up and we can recheck his labs at that time

## 2021-11-15 ENCOUNTER — OFFICE VISIT (OUTPATIENT)
Dept: INTERNAL MEDICINE | Facility: CLINIC | Age: 76
End: 2021-11-15
Payer: COMMERCIAL

## 2021-11-15 VITALS
SYSTOLIC BLOOD PRESSURE: 130 MMHG | BODY MASS INDEX: 32.33 KG/M2 | OXYGEN SATURATION: 97 % | RESPIRATION RATE: 16 BRPM | HEART RATE: 65 BPM | DIASTOLIC BLOOD PRESSURE: 82 MMHG | HEIGHT: 67 IN | WEIGHT: 206 LBS

## 2021-11-15 DIAGNOSIS — E03.9 ACQUIRED HYPOTHYROIDISM: ICD-10-CM

## 2021-11-15 PROCEDURE — 99214 OFFICE O/P EST MOD 30 MIN: CPT | Performed by: INTERNAL MEDICINE

## 2021-11-15 RX ORDER — LEVOTHYROXINE SODIUM 88 UG/1
88 TABLET ORAL DAILY
Qty: 90 TABLET | Refills: 3 | Status: SHIPPED | OUTPATIENT
Start: 2021-11-15 | End: 2022-12-09

## 2021-11-15 ASSESSMENT — MIFFLIN-ST. JEOR: SCORE: 1633.04

## 2021-11-15 ASSESSMENT — PAIN SCALES - GENERAL: PAINLEVEL: NO PAIN (0)

## 2021-11-15 NOTE — NURSING NOTE
Jonathon Guerra is a 76 year old male patient that presents today in clinic for the following:    Chief Complaint   Patient presents with     RECHECK     Interested in getting some labs done     The patient's allergies and medications were reviewed as noted. A set of vitals were recorded as noted without incident. The patient does not have any other questions for the provider.    Jaz Lopez, EMT at 7:20 AM on 11/15/2021

## 2022-01-14 ENCOUNTER — MYC MEDICAL ADVICE (OUTPATIENT)
Dept: INTERNAL MEDICINE | Facility: CLINIC | Age: 77
End: 2022-01-14
Payer: COMMERCIAL

## 2022-01-14 DIAGNOSIS — I25.10 CORONARY ARTERY DISEASE INVOLVING NATIVE CORONARY ARTERY OF NATIVE HEART WITHOUT ANGINA PECTORIS: ICD-10-CM

## 2022-01-14 RX ORDER — SOTALOL HYDROCHLORIDE 80 MG/1
80 TABLET ORAL 2 TIMES DAILY
Qty: 180 TABLET | Refills: 3 | OUTPATIENT
Start: 2022-01-14

## 2022-01-14 NOTE — TELEPHONE ENCOUNTER
sotalol (BETAPACE) 80 MG tablet    Take 1 tablet (80 mg) by mouth 2 times daily     Last Written Prescription Date:  7/12/21  Last Fill Quantity: 180,   # refills: 3  Last Office Visit : 11/15/21  Future Office visit: 4/11/22    Routing FYI  because:  This nurse spoke to pharmacistPaige. Patient had prescription transferred to  in July. However, pharmacist will call to have  transfer remainder refills back to [facility], [address],

## 2022-02-20 ENCOUNTER — HEALTH MAINTENANCE LETTER (OUTPATIENT)
Age: 77
End: 2022-02-20

## 2022-04-09 NOTE — PROGRESS NOTES
HPI:    Last visit with us 11/15/2021. Overall doing well and tries to walk up to 7 miles a day. Some minor  L knee pain that has resolved. He had R sided back pain and some possible R sided posterior thigh radicular sxs. He did see a Chiropractor and his sxs. Are now resolved. Otherwise, no additional HEENT, cardiopulmonary, abdominal, , neurological, systemic, psychiatric, lymphatic, endocrine, vascular complaints. He does not smoke. He does no feel he has been in afib for quite some time.     Past Medical History:   Diagnosis Date     Atrial fibrillation (H)      Coronary artery disease      Hyperlipidemia      Hypertension      Thyroid disease      Past Surgical History:   Procedure Laterality Date     HERNIA REPAIR, INGUINAL RT/LT       OTHER SURGICAL HISTORY      upper and lower partial plate     PE:    Vitals noted, gen, nad, cooperative, alert, neck supple, nl rom, lungs with good air movement, RRR, S1, S2, no MRG, abdomen, no acute findings. L knee w/o swelling, no tenderness. No tenderness to palpation of lower lumbar spine. Grossly normal neurological exam.     A/P:    1.  Immunizations; Moderna COVID vaccine x 3. Influenza vaccine 9/10/2021. Prevnar 13 done 8/2/2017, Tdap 10/28/2014. He has completed ths Shinrix Zoster vaccine series. He may participate in a COVID vaccine study and wants to wait on 2nd booster (4th immunization). Bill antibody test today.  Pneumococcal 23 done 10/28/2014  2. On Eliquis for afib and Metoprolol. Last cardiology visit 10/28/2021  3. On thyroid replacement; TSH 1/12/2021 was 2.0 and ordered labs 4/9/2022  4. Increased lipids on Rosuvastatin. Lipid panel 10/25/2021 TG's 80, HDL 73, and LDL 53  5. PSA 1/12/2021 high normal at 3.03  6. Dermatology skin check 10/20/2021  7. Colonoscopy 7/1/2020      30 minutes spent on the date of the encounter doing chart review, history and exam, documentation and further activities as noted above

## 2022-04-11 ENCOUNTER — LAB (OUTPATIENT)
Dept: LAB | Facility: CLINIC | Age: 77
End: 2022-04-11
Payer: COMMERCIAL

## 2022-04-11 ENCOUNTER — OFFICE VISIT (OUTPATIENT)
Dept: INTERNAL MEDICINE | Facility: CLINIC | Age: 77
End: 2022-04-11
Payer: COMMERCIAL

## 2022-04-11 VITALS
HEART RATE: 69 BPM | DIASTOLIC BLOOD PRESSURE: 78 MMHG | HEIGHT: 67 IN | SYSTOLIC BLOOD PRESSURE: 119 MMHG | OXYGEN SATURATION: 97 % | BODY MASS INDEX: 30.61 KG/M2 | WEIGHT: 195 LBS

## 2022-04-11 DIAGNOSIS — R94.6 THYROID FUNCTION TEST ABNORMAL: ICD-10-CM

## 2022-04-11 DIAGNOSIS — Z20.822 COVID-19 RULED OUT: ICD-10-CM

## 2022-04-11 DIAGNOSIS — E78.00 HIGH BLOOD CHOLESTEROL: ICD-10-CM

## 2022-04-11 DIAGNOSIS — Z12.5 ENCOUNTER FOR SCREENING FOR MALIGNANT NEOPLASM OF PROSTATE: ICD-10-CM

## 2022-04-11 DIAGNOSIS — I48.0 PAROXYSMAL ATRIAL FIBRILLATION (H): Primary | ICD-10-CM

## 2022-04-11 DIAGNOSIS — I48.0 PAROXYSMAL ATRIAL FIBRILLATION (H): ICD-10-CM

## 2022-04-11 LAB
ALBUMIN SERPL-MCNC: 3.9 G/DL (ref 3.4–5)
ALP SERPL-CCNC: 43 U/L (ref 40–150)
ALT SERPL W P-5'-P-CCNC: 31 U/L (ref 0–70)
ANION GAP SERPL CALCULATED.3IONS-SCNC: 6 MMOL/L (ref 3–14)
AST SERPL W P-5'-P-CCNC: 23 U/L (ref 0–45)
BASOPHILS # BLD AUTO: 0 10E3/UL (ref 0–0.2)
BASOPHILS NFR BLD AUTO: 0 %
BILIRUB SERPL-MCNC: 0.4 MG/DL (ref 0.2–1.3)
BUN SERPL-MCNC: 18 MG/DL (ref 7–30)
CALCIUM SERPL-MCNC: 9.2 MG/DL (ref 8.5–10.1)
CHLORIDE BLD-SCNC: 106 MMOL/L (ref 94–109)
CHOLEST SERPL-MCNC: 110 MG/DL
CO2 SERPL-SCNC: 30 MMOL/L (ref 20–32)
CREAT SERPL-MCNC: 0.77 MG/DL (ref 0.66–1.25)
EOSINOPHIL # BLD AUTO: 0.1 10E3/UL (ref 0–0.7)
EOSINOPHIL NFR BLD AUTO: 1 %
ERYTHROCYTE [DISTWIDTH] IN BLOOD BY AUTOMATED COUNT: 12.9 % (ref 10–15)
FASTING STATUS PATIENT QL REPORTED: YES
GFR SERPL CREATININE-BSD FRML MDRD: >90 ML/MIN/1.73M2
GLUCOSE BLD-MCNC: 104 MG/DL (ref 70–99)
HCT VFR BLD AUTO: 41 % (ref 40–53)
HDLC SERPL-MCNC: 57 MG/DL
HGB BLD-MCNC: 13.6 G/DL (ref 13.3–17.7)
IMM GRANULOCYTES # BLD: 0.1 10E3/UL
IMM GRANULOCYTES NFR BLD: 2 %
LDLC SERPL CALC-MCNC: 33 MG/DL
LYMPHOCYTES # BLD AUTO: 2 10E3/UL (ref 0.8–5.3)
LYMPHOCYTES NFR BLD AUTO: 27 %
MCH RBC QN AUTO: 31.1 PG (ref 26.5–33)
MCHC RBC AUTO-ENTMCNC: 33.2 G/DL (ref 31.5–36.5)
MCV RBC AUTO: 94 FL (ref 78–100)
MONOCYTES # BLD AUTO: 0.7 10E3/UL (ref 0–1.3)
MONOCYTES NFR BLD AUTO: 10 %
NEUTROPHILS # BLD AUTO: 4.4 10E3/UL (ref 1.6–8.3)
NEUTROPHILS NFR BLD AUTO: 60 %
NONHDLC SERPL-MCNC: 53 MG/DL
NRBC # BLD AUTO: 0 10E3/UL
NRBC BLD AUTO-RTO: 0 /100
PLATELET # BLD AUTO: 141 10E3/UL (ref 150–450)
POTASSIUM BLD-SCNC: 4.1 MMOL/L (ref 3.4–5.3)
PROT SERPL-MCNC: 7.6 G/DL (ref 6.8–8.8)
PSA SERPL-MCNC: 3.84 UG/L (ref 0–4)
RBC # BLD AUTO: 4.38 10E6/UL (ref 4.4–5.9)
SODIUM SERPL-SCNC: 142 MMOL/L (ref 133–144)
TRIGL SERPL-MCNC: 100 MG/DL
TSH SERPL DL<=0.005 MIU/L-ACNC: 1.91 MU/L (ref 0.4–4)
WBC # BLD AUTO: 7.3 10E3/UL (ref 4–11)

## 2022-04-11 PROCEDURE — 99214 OFFICE O/P EST MOD 30 MIN: CPT | Performed by: INTERNAL MEDICINE

## 2022-04-11 PROCEDURE — 85025 COMPLETE CBC W/AUTO DIFF WBC: CPT | Performed by: PATHOLOGY

## 2022-04-11 PROCEDURE — 99000 SPECIMEN HANDLING OFFICE-LAB: CPT | Performed by: PATHOLOGY

## 2022-04-11 PROCEDURE — 80053 COMPREHEN METABOLIC PANEL: CPT | Performed by: PATHOLOGY

## 2022-04-11 PROCEDURE — 80061 LIPID PANEL: CPT | Performed by: PATHOLOGY

## 2022-04-11 PROCEDURE — 36415 COLL VENOUS BLD VENIPUNCTURE: CPT | Performed by: PATHOLOGY

## 2022-04-11 PROCEDURE — 84443 ASSAY THYROID STIM HORMONE: CPT | Performed by: PATHOLOGY

## 2022-04-11 PROCEDURE — 86769 SARS-COV-2 COVID-19 ANTIBODY: CPT | Mod: 90 | Performed by: PATHOLOGY

## 2022-04-11 PROCEDURE — G0103 PSA SCREENING: HCPCS | Performed by: PATHOLOGY

## 2022-04-11 ASSESSMENT — PAIN SCALES - GENERAL: PAINLEVEL: NO PAIN (0)

## 2022-04-11 NOTE — PATIENT INSTRUCTIONS
Thank you for visiting the Primary Care Center today at the [facility]! The following is some information about our clinic:     Primary Care Center Frequently-Asked Questions    (1) How do I schedule appointments at the [facility]?     Primary Care--to schedule or make changes to an existing appointment, please call our primary care line at [phone].    Labs--to schedule a lab appointment at the [facility] you can use [employer] or call [phone]. If you have a [place] location that is closer to home, you can reach out to that location for scheduling options.     Imaging--if you need to schedule a CT, X-ray, MRI, ultrasound, or other imaging study you can call [phone] to schedule at the [facility] or any other [facility] imaging location.     Referrals--if a referral to another specialty was ordered you can expect a phone call from their scheduling team. If you have not heard from them in a week, please call us or send us a Squrl message to check the status or get a scheduling number. Please note that this only applies to internal [facility] referrals. If the referral is external you would need to contact their office for scheduling.     (2) I have a question about my visit, who do I contact?     You can call us at the primary care line at [phone] to ask questions about your visit. You can also send a secure message through Squrl, which is reviewed by clinic staff. Please note that [employer] messages have a twenty-four to forty-eight business hour turnaround time and should not be used for urgent concerns.    (3) How will I get the results of my tests?    If you are signed up for [employer] all tests will be released to you within twenty-four hours of resulting. Please allow three to five days for your doctor to review your results and place a note interpreting the results. If you do not have [employer] you will receive your  results through mail seven to ten business days following the return of the tests. Please note that if there should be any urgent or concerning results that your doctor or their registered nurse will reach out to you the same day as the tests come back. If you have follow up questions about your results or would like to discuss the results in detail please schedule a follow up with your provider either in person or virtually.     (4) How do I get refills of my prescriptions?     You should always first contact your pharmacy for refills of your medications. If submitting a refill request on [employer], please be sure to submit the request only once--repeat requests can cause delays in refill. If you are requesting a NEW medication or a medication related to new symptoms you will need to schedule an appointment with a provider prior to approval. Please note: Routine medication refills have up to one to three business day turnaround whereas controlled substances refills have up to five to seven business day turnaround.    (5) I have new symptoms, what do I do?     If you are having an immediate medical emergency, you should dial 911 for assistance.   For anything urgent that needs to be seen within a few hours to one day you should visit a local urgent care for assistance.  For non-urgent symptoms that need to be seen within a few days to a week you can schedule with an available provider in primary care by going to [employer] or calling [phone].   If you are not sure how serious your symptoms are or you would like to receive medical advice you can always call [phone] to speak with a triage nurse.

## 2022-04-12 LAB
SARS-COV-2 AB SERPL IA-ACNC: >250 U/ML
SARS-COV-2 AB SERPL-IMP: POSITIVE

## 2022-04-14 ENCOUNTER — TELEPHONE (OUTPATIENT)
Dept: INTERNAL MEDICINE | Facility: CLINIC | Age: 77
End: 2022-04-14

## 2022-04-14 DIAGNOSIS — D69.1 PLATELET DISORDER (H): Primary | ICD-10-CM

## 2022-04-14 NOTE — TELEPHONE ENCOUNTER
Placed future order this encounter    Dear Mr. Guerra;    Your laboratory tests are mostly stable. Your platelets are a little low and I recommend we recheck in a few months. I placed a laboratory order today and you can call [phone] to schedule this appointment.     MARGARET Walker MD

## 2022-08-05 DIAGNOSIS — E78.00 HYPERCHOLESTEROLEMIA: ICD-10-CM

## 2022-08-08 RX ORDER — ROSUVASTATIN CALCIUM 40 MG/1
40 TABLET, COATED ORAL DAILY
Qty: 90 TABLET | Refills: 0 | Status: SHIPPED | OUTPATIENT
Start: 2022-08-08 | End: 2022-10-11

## 2022-08-08 NOTE — TELEPHONE ENCOUNTER
Last Clinic Visit: 10/28/2021 [facility]

## 2022-09-08 NOTE — PROGRESS NOTES
HPI:  Patient presents for an annual exam. Vision is stable - does not like lined bi-focal.     Social history: Lives in [facility]. Patient walks to the eye clinic from [facility]. Walks between 5-8 miles per day. Walks about 20k steps per day.       Pertinent Medical History:    Vertigo    Obstructive sleep apnea    Hypothyroidism    Hyperlipidemia    Atrial fibrillation    Atherosclerosis of coronary artery    Arthropathy lower leg    Dizziness    Ocular History:    Myopia, both eyes.     Cataract, both eyes.     Family history of macular degeneration - aunt and 3 cousins    Eye Medications:    Allergic to penicillins    Assessment and Plan:  1.   Myopia, both eyes.     Does not like lined bi-focal.     2.   Cataract, both eyes.     Not visually significant. Monitoir.     3.   Dry Eye Syndrome, both eyes.     Continue preservative free artificial tears 4 times daily both eyes.     4.   Allergic Conjunctivitis, both eyes.     Start pataday once daily both eyes for 3 months. Started 09/14/2022.     5.   Macular Pigmentary Changes, left eye.     Not a smoker.    Recommend UV protection.     Recommend fish and green leafy vegetables 2-3 days per week.     Macular OCT 09/14/2022: Both eyes: no SRF, normal. Pigmentary macular changes in the left eye seen on fundus exam.     Recommend annual dilated eye exam with macular OCT.     6.   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 Retina attached    Educated on signs and symptoms of a retinal detachment (ie. Hundreds of floaters, flashes of light, and shadow/curtain over the vision) to be seen immediately.     Possible retinal schisis nferior nasal of the periphery in the right eye - unable to get oct raster over that area. Recommend retina consult for possible peripheral retinoschisis of the right eye.    7.    Posterior Vitreous Detachment, both eyes. Retinas attached.     Educated on signs and symptoms of a retinal detachment (ie. Hundreds of floaters, flashes of light,  and shadow/curtain over the vision) to be seen immediately.       Patient consented to a dilated eye exam:    Yes. Side effects discussed.    Medical History:  Past Medical History:   Diagnosis Date     Atrial fibrillation (H)      Coronary artery disease      Hyperlipidemia      Hypertension      Thyroid disease        Medications:  Current Outpatient Medications   Medication Sig Dispense Refill     Acetaminophen 650 MG TABS Take by mouth daily       apixaban ANTICOAGULANT (ELIQUIS) 5 MG tablet Take 1 tablet (5 mg) by mouth 2 times daily 180 tablet 3     aspirin (ASA) 81 MG EC tablet Take 1 tablet (81 mg) by mouth daily 90 tablet 3     cholecalciferol (VITAMIN D-1000 MAX ST) 25 MCG (1000 UT) TABS Take 2 tablets by mouth daily 180 tablet 3     Coenzyme Q10-Vitamin E (QUNOL ULTRA COQ10 PO)        Diphenhydramine-APAP, sleep, (ACETAMINOPHEN PM PO) Take 500 mg by mouth       FLUZONE HIGH-DOSE QUADRIVALENT 0.7 ML LAVON injection Pt had flu shot 8/25       ketoconazole (NIZORAL) 2 % external cream Apply to the feet and inbetween the toes daily x 1 month then as needed. 60 g 3     levothyroxine (SYNTHROID/LEVOTHROID) 88 MCG tablet Take 1 tablet (88 mcg) by mouth daily 90 tablet 3     Melatonin 10 MG TABS tablet Take 10 mg by mouth       metoprolol succinate ER (TOPROL-XL) 25 MG 24 hr tablet Take 0.5 tablets (12.5 mg) by mouth daily 90 tablet 1     Multiple Vitamin (MULTI-VITAMIN PO) Take by mouth daily        rosuvastatin (CRESTOR) 40 MG tablet Take 1 tablet (40 mg) by mouth daily 90 tablet 0     saw palmetto 450 MG CAPS capsule        sotalol (BETAPACE) 80 MG tablet Take 1 tablet (80 mg) by mouth 2 times daily 180 tablet 3     triamcinolone (KENALOG) 0.1 % external cream APPLY TO AFFECTED AREA on scalp and lower legs as needed twice daily until rashes clear 45 g 1   Complete documentation of historical and exam elements from today's encounter can be found in the full encounter summary report (not reduplicated in this  progress note). I personally obtained the chief complaint(s) and history of present illness.  I confirmed and edited as necessary the review of systems, past medical/surgical history, family history, social history, and examination findings as documented by others; and I examined the patient myself. I personally reviewed the relevant tests, images, and reports as documented above. I formulated and edited as necessary the assessment and plan and discussed the findings and management plan with the patient and family. - Renzo Blakely, OD

## 2022-09-14 ENCOUNTER — OFFICE VISIT (OUTPATIENT)
Dept: OPHTHALMOLOGY | Facility: CLINIC | Age: 77
End: 2022-09-14
Attending: OPTOMETRIST
Payer: COMMERCIAL

## 2022-09-14 DIAGNOSIS — H25.13 NUCLEAR SENILE CATARACT OF BOTH EYES: ICD-10-CM

## 2022-09-14 DIAGNOSIS — H35.89 RPE MOTTLING OF MACULA: Primary | ICD-10-CM

## 2022-09-14 DIAGNOSIS — H52.13 MYOPIA OF BOTH EYES: ICD-10-CM

## 2022-09-14 DIAGNOSIS — H04.123 DRY EYE SYNDROME OF BOTH EYES: ICD-10-CM

## 2022-09-14 DIAGNOSIS — H10.13 ALLERGIC CONJUNCTIVITIS OF BOTH EYES: ICD-10-CM

## 2022-09-14 PROCEDURE — 92134 CPTRZ OPH DX IMG PST SGM RTA: CPT | Performed by: OPTOMETRIST

## 2022-09-14 PROCEDURE — 92250 FUNDUS PHOTOGRAPHY W/I&R: CPT | Performed by: OPTOMETRIST

## 2022-09-14 PROCEDURE — 92014 COMPRE OPH EXAM EST PT 1/>: CPT | Performed by: OPTOMETRIST

## 2022-09-14 PROCEDURE — 99207 FUNDUS PHOTOS OU (BOTH EYES): CPT | Mod: 26 | Performed by: OPTOMETRIST

## 2022-09-14 ASSESSMENT — SLIT LAMP EXAM - LIDS
COMMENTS: NORMAL
COMMENTS: NORMAL

## 2022-09-14 ASSESSMENT — VISUAL ACUITY
METHOD: SNELLEN - LINEAR
OS_CC: 20/20-2
OD_CC: 20/20-2

## 2022-09-14 ASSESSMENT — TONOMETRY
IOP_METHOD: TONOPEN
OD_IOP_MMHG: 19
OS_IOP_MMHG: 18

## 2022-09-14 ASSESSMENT — EXTERNAL EXAM - LEFT EYE: OS_EXAM: NORMAL

## 2022-09-14 ASSESSMENT — EXTERNAL EXAM - RIGHT EYE: OD_EXAM: NORMAL

## 2022-09-14 ASSESSMENT — CONF VISUAL FIELD
OD_NORMAL: 1
OS_NORMAL: 1

## 2022-09-14 NOTE — PATIENT INSTRUCTIONS
Pataday once daily both eyes for 3 months.   Preservative free artificial tears 4 times daily both eyes.     Keep all eye drops 5 minutes over time.

## 2022-09-28 DIAGNOSIS — H35.89 RPE MOTTLING OF MACULA: Primary | ICD-10-CM

## 2022-10-05 ENCOUNTER — LAB (OUTPATIENT)
Dept: LAB | Facility: CLINIC | Age: 77
End: 2022-10-05
Payer: COMMERCIAL

## 2022-10-05 DIAGNOSIS — R97.20 ELEVATED PROSTATE SPECIFIC ANTIGEN (PSA): ICD-10-CM

## 2022-10-05 DIAGNOSIS — Z12.5 ENCOUNTER FOR SCREENING FOR MALIGNANT NEOPLASM OF PROSTATE: ICD-10-CM

## 2022-10-05 DIAGNOSIS — D69.1 PLATELET DISORDER (H): ICD-10-CM

## 2022-10-05 LAB
BASOPHILS # BLD AUTO: 0 10E3/UL (ref 0–0.2)
BASOPHILS NFR BLD AUTO: 0 %
EOSINOPHIL # BLD AUTO: 0.1 10E3/UL (ref 0–0.7)
EOSINOPHIL NFR BLD AUTO: 1 %
ERYTHROCYTE [DISTWIDTH] IN BLOOD BY AUTOMATED COUNT: 12.8 % (ref 10–15)
HCT VFR BLD AUTO: 41.4 % (ref 40–53)
HGB BLD-MCNC: 14.1 G/DL (ref 13.3–17.7)
HOLD SPECIMEN: NORMAL
IMM GRANULOCYTES # BLD: 0.1 10E3/UL
IMM GRANULOCYTES NFR BLD: 1 %
LYMPHOCYTES # BLD AUTO: 2.4 10E3/UL (ref 0.8–5.3)
LYMPHOCYTES NFR BLD AUTO: 41 %
MCH RBC QN AUTO: 31.4 PG (ref 26.5–33)
MCHC RBC AUTO-ENTMCNC: 34.1 G/DL (ref 31.5–36.5)
MCV RBC AUTO: 92 FL (ref 78–100)
MONOCYTES # BLD AUTO: 0.5 10E3/UL (ref 0–1.3)
MONOCYTES NFR BLD AUTO: 10 %
NEUTROPHILS # BLD AUTO: 2.7 10E3/UL (ref 1.6–8.3)
NEUTROPHILS NFR BLD AUTO: 47 %
NRBC # BLD AUTO: 0 10E3/UL
NRBC BLD AUTO-RTO: 0 /100
PLATELET # BLD AUTO: 155 10E3/UL (ref 150–450)
RBC # BLD AUTO: 4.49 10E6/UL (ref 4.4–5.9)
WBC # BLD AUTO: 5.7 10E3/UL (ref 4–11)

## 2022-10-05 PROCEDURE — 99000 SPECIMEN HANDLING OFFICE-LAB: CPT | Performed by: PATHOLOGY

## 2022-10-05 PROCEDURE — G0103 PSA SCREENING: HCPCS | Mod: 90 | Performed by: PATHOLOGY

## 2022-10-05 PROCEDURE — 36415 COLL VENOUS BLD VENIPUNCTURE: CPT | Performed by: PATHOLOGY

## 2022-10-05 PROCEDURE — 85025 COMPLETE CBC W/AUTO DIFF WBC: CPT | Performed by: PATHOLOGY

## 2022-10-09 DIAGNOSIS — I25.10 CORONARY ARTERY DISEASE INVOLVING NATIVE CORONARY ARTERY OF NATIVE HEART WITHOUT ANGINA PECTORIS: ICD-10-CM

## 2022-10-09 NOTE — PROGRESS NOTES
HPI    Overall doing quite well. He walked to clinic today (about 3 miles). No new HEENT, cardiopulmonary, abdominal, , neurological, systemic, psychiatric, lymphatic, endocrine, vascular complaints.     Past Medical History:   Diagnosis Date     Atrial fibrillation (H)      Coronary artery disease      Hyperlipidemia      Hypertension      Thyroid disease      Past Surgical History:   Procedure Laterality Date     HERNIA REPAIR, INGUINAL RT/LT       OTHER SURGICAL HISTORY      upper and lower partial plate     PE:    Vitals noted, gen ,nad, cooperative, alert, neck supple nl rom, lungs with good air movement, RRR, S1, S2, no MRG, abdomen, no acute findings. Grossly normal neurological exam.     A/P:     1.  Immunizations; Moderna COVID vaccine x 4. Influenza vaccine done this year.   Prevnar 13. done 8/2/2017, Tdap 10/28/2014. He has completed ths Shinrix Zoster vaccine series.   Pneumococcal 23 done 10/28/2014. Prevnar 20 vaccine today  2. On Eliquis for afib and Metoprolol. Last cardiology visit 10/28/2021 with MsTerence Eva. He sees MsTerence Edi today 10/11/2022  3. On thyroid replacement; TSH  was 1.91 on 4/9/2022  4. Increased lipids on Rosuvastatin. Lipid panel 4/11/2022 TG's 100, HDL 57 and LDL 33  5. PSA 3.84 on 4/11/2022. Done 10/5/2022 down 2.87  6. Dermatology skin check 10/20/2021. He sees Dr. English 10/11/2022 today  7. Colonoscopy 7/1/2020  8. Repeat CBC 10/5/2022 normal platelets at 155.      30 minutes spent on the date of the encounter doing chart review, history and exam, documentation and further activities as noted above

## 2022-10-10 LAB — PSA SERPL-MCNC: 2.87 NG/ML (ref 0–6.5)

## 2022-10-10 NOTE — PROGRESS NOTES
Cardiology Progress Note    Date of Service: 10/11/22    Reason for visit: Annual follow up, nonobstructive CAD, atrial fibrillation.    Primary cardiologist: Dr. Aguilera      HPI:  Mr. Guerra is a very pleasant 77 year old male with a PMHx including hyperlipidemia, paroxysmal atrial fibrillation on AC, previous tobacco use (quit >20 yrs ago) and nonobstructive CAD based on a coronary CT in 2006. His afib hx dates back to 2006 when he required multiple cardioversions, and ultimately was placed on sotalol. He has done well since that time. He has remained very active, and most recent ischemic evaluation via [facility] in 2018 with above exercise capacity and no ischemia. He was seen last by Kristy Velasquez CNP in Oct of 2021 for an annual visit at which time he was feeling well and no changes were made.     Today, I am meeting Jonathon in lab for a routine annual visit. He tells me that he continues to feel well with no new concerns. He walks daily for long periods (in fact, walked 3 miles to his appt [place]) and has no ALMONTE, chest discomfort, dizziness, or presyncope. He has no new LE edema or orthopnea. He has not felt any palpitations and does not think he has had any afib episodes for over 10 years.     The patient did not have any new labs performed for our visit today, but most recent available labs were reviewed as below.       ASSESSMENT/PLAN:    1. Nonobstructive coronary disease.   --History of mild to moderate nonocclusive CAD based on prior CT imaging in 2006. His most recent evaluation was via STECHO in 2018 at which time he had above average exercise capacity and no ischemia. He is free of angina with no ne concerning ischemic symptoms.   --He is not on ASA while on AC as below.   --BP control is very good, continue metoprolol as is.   --Continue rosuvastatin 40mg daily. Last lipid profile from April was excellent, with an LDL of 33.    --I encouraged him to remain active as he is  doing, and a continued healthy lifestyle was encouraged.      2. Paroxysmal atrial fibrillation.   --Initially noted in 2006 at which time he required multiple cardioversions. He was subsequently placed on sotalol with good result. He has not had any symptomatic recurrence for over 10 years. EKG today confirms normal sinus rhythm with a HR of 61.    --Continue apixiban, for [id number] score of 3. No bleeding concerns.       Follow up plan: Return in 1 year to establish with a new general cardiologist. I asked him to call sooner with any new concerns.       Orders this Visit:  Orders Placed This Encounter   Procedures     Follow-Up with Cardiology     EKG 12-lead, tracing only (Same Day)     Orders Placed This Encounter   Medications     apixaban ANTICOAGULANT (ELIQUIS) 5 MG tablet     Sig: Take 1 tablet (5 mg) by mouth 2 times daily     Dispense:  180 tablet     Refill:  3     metoprolol succinate ER (TOPROL XL) 25 MG 24 hr tablet     Sig: Take 0.5 tablets (12.5 mg) by mouth daily     Dispense:  90 tablet     Refill:  3     rosuvastatin (CRESTOR) 40 MG tablet     Sig: Take 1 tablet (40 mg) by mouth daily     Dispense:  90 tablet     Refill:  3     sotalol (BETAPACE) 80 MG tablet     Sig: Take 1 tablet (80 mg) by mouth 2 times daily     Dispense:  180 tablet     Refill:  3     Medications Discontinued During This Encounter   Medication Reason     apixaban ANTICOAGULANT (ELIQUIS) 5 MG tablet Reorder     metoprolol succinate ER (TOPROL-XL) 25 MG 24 hr tablet Reorder     rosuvastatin (CRESTOR) 40 MG tablet Reorder     sotalol (BETAPACE) 80 MG tablet Reorder           CURRENT MEDICATIONS:  Current Outpatient Medications   Medication Sig Dispense Refill     Acetaminophen 650 MG TABS Take by mouth daily       apixaban ANTICOAGULANT (ELIQUIS) 5 MG tablet Take 1 tablet (5 mg) by mouth 2 times daily 180 tablet 3     cholecalciferol (VITAMIN D-1000 MAX ST) 25 MCG (1000 UT) TABS Take 2 tablets by mouth daily 180 tablet 3      Coenzyme Q10-Vitamin E (QUNOL ULTRA COQ10 PO)        Diphenhydramine-APAP, sleep, (ACETAMINOPHEN PM PO) Take 500 mg by mouth       FLUZONE HIGH-DOSE QUADRIVALENT 0.7 ML LAVON injection Pt had flu shot 8/25       ketoconazole (NIZORAL) 2 % external cream Apply to the feet and inbetween the toes daily x 1 month then as needed. 60 g 3     levothyroxine (SYNTHROID/LEVOTHROID) 88 MCG tablet Take 1 tablet (88 mcg) by mouth daily 90 tablet 3     Melatonin 10 MG TABS tablet Take 10 mg by mouth       metoprolol succinate ER (TOPROL XL) 25 MG 24 hr tablet Take 0.5 tablets (12.5 mg) by mouth daily 90 tablet 3     Multiple Vitamin (MULTI-VITAMIN PO) Take by mouth daily        rosuvastatin (CRESTOR) 40 MG tablet Take 1 tablet (40 mg) by mouth daily 90 tablet 3     saw palmetto 450 MG CAPS capsule        sotalol (BETAPACE) 80 MG tablet Take 1 tablet (80 mg) by mouth 2 times daily 180 tablet 3     triamcinolone (KENALOG) 0.1 % external cream APPLY TO AFFECTED AREA on scalp and lower legs as needed twice daily until rashes clear 45 g 1     vitamin B-Complex Take 1 tablet by mouth daily       zinc gluconate 50 MG tablet Take 1 tablet (50 mg) by mouth daily         ALLERGIES     Allergies   Allergen Reactions     Atorvastatin      Lipitor--muscle aches       PAST MEDICAL HISTORY:  Past Medical History:   Diagnosis Date     Atrial fibrillation (H)      Coronary artery disease      Hyperlipidemia      Hypertension      Thyroid disease          Review of Systems:  Cardiovascular: negative for chest pain, palpitations, orthopnea, LE edema  Constitutional: negative for chills, sweats, fevers   Resp: Negative for dyspnea at rest, dyspnea on exertion, known chronic lung disease  HEENT: Negative for new visual changes, frequent headaches  Gastrointestinal: negative for abdominal pain, diarrhea, nausea, vomiting  Hematologic/lymphatic: negative for current systemic anticoagulation, hx of blood clots  Neurological: negative for focal weakness,  "LOC, seizures, syncope/presyncope       Physical Exam:  Vitals: /85 (BP Location: Left arm, Patient Position: Sitting, Cuff Size: Adult Regular)   Pulse 61   Ht 1.74 m (5' 8.5\")   Wt 91.4 kg (201 lb 6.4 oz)   SpO2 97%   BMI 30.17 kg/m     Wt Readings from Last 4 Encounters:   10/11/22 91.4 kg (201 lb 6.4 oz)   10/11/22 91.2 kg (201 lb)   04/11/22 88.5 kg (195 lb)   11/15/21 93.4 kg (206 lb)       GEN:  In general, this is a well nourished fit appearing  male in no acute distress on RA.  Patient ambulatory, unaccompanied.   HEENT:  Pupils grossly equal, sclerae nonicteric.   NECK: Supple, trachea midline. No JVD. No carotid bruits.  C/V:  Regular rate and rhythm, no murmur, rub or gallop.   RESP: Respirations are unlabored. No use of accessory muscles. Clear to auscultation bilaterally without wheezing, rales, or rhonchi.  EXTREM: No LE edema.   NEURO: Alert and oriented, cooperative. Gait not formally assessed. No obvious focal deficits.   SKIN: Warm and dry.       Recent Lab Results:  LIPID RESULTS:  Lab Results   Component Value Date    CHOL 110 04/11/2022    CHOL 118 10/30/2020    HDL 57 04/11/2022    HDL 59 10/30/2020    LDL 33 04/11/2022    LDL 49 10/30/2020    TRIG 100 04/11/2022    TRIG 52 10/30/2020    CHOLHDLRATIO 2.8 05/12/2015       LIVER ENZYME RESULTS:  Lab Results   Component Value Date    AST 23 04/11/2022    AST 18 01/12/2021    ALT 31 04/11/2022    ALT 27 01/12/2021       CBC RESULTS:  Lab Results   Component Value Date    WBC 5.7 10/05/2022    WBC 5.6 01/12/2021    RBC 4.49 10/05/2022    RBC 4.45 01/12/2021    HGB 14.1 10/05/2022    HGB 13.7 01/12/2021    HCT 41.4 10/05/2022    HCT 42.3 01/12/2021    MCV 92 10/05/2022    MCV 95 01/12/2021    MCH 31.4 10/05/2022    MCH 30.8 01/12/2021    MCHC 34.1 10/05/2022    MCHC 32.4 01/12/2021    RDW 12.8 10/05/2022    RDW 12.8 01/12/2021     10/05/2022     01/12/2021       BMP RESULTS:  Lab Results   Component Value Date    NA " 142 04/11/2022     01/12/2021    POTASSIUM 4.1 04/11/2022    POTASSIUM 4.0 01/12/2021    CHLORIDE 106 04/11/2022    CHLORIDE 108 01/12/2021    CO2 30 04/11/2022    CO2 32 01/12/2021    ANIONGAP 6 04/11/2022    ANIONGAP 2 (L) 01/12/2021     (H) 04/11/2022    GLC 97 01/12/2021    BUN 18 04/11/2022    BUN 20 01/12/2021    CR 0.77 04/11/2022    CR 0.96 01/12/2021    GFRESTIMATED >90 04/11/2022    GFRESTIMATED 78 01/12/2021    GFRESTBLACK >90 01/12/2021    MARTELL 9.2 04/11/2022    MARTELL 9.1 01/12/2021        25 total minutes was spent today including chart review, precharting, history and exam, post visit documentation, and reviewing studies as outlined above.     Quiana Daley PA-C  [facility]  Pager [phone]

## 2022-10-11 ENCOUNTER — OFFICE VISIT (OUTPATIENT)
Dept: DERMATOLOGY | Facility: CLINIC | Age: 77
End: 2022-10-11
Payer: COMMERCIAL

## 2022-10-11 ENCOUNTER — OFFICE VISIT (OUTPATIENT)
Dept: INTERNAL MEDICINE | Facility: CLINIC | Age: 77
End: 2022-10-11
Payer: COMMERCIAL

## 2022-10-11 ENCOUNTER — OFFICE VISIT (OUTPATIENT)
Dept: CARDIOLOGY | Facility: CLINIC | Age: 77
End: 2022-10-11
Attending: PHYSICIAN ASSISTANT
Payer: COMMERCIAL

## 2022-10-11 VITALS
OXYGEN SATURATION: 95 % | HEIGHT: 67 IN | HEART RATE: 66 BPM | WEIGHT: 201 LBS | SYSTOLIC BLOOD PRESSURE: 114 MMHG | BODY MASS INDEX: 31.55 KG/M2 | DIASTOLIC BLOOD PRESSURE: 77 MMHG

## 2022-10-11 VITALS
WEIGHT: 201.4 LBS | HEART RATE: 61 BPM | DIASTOLIC BLOOD PRESSURE: 85 MMHG | SYSTOLIC BLOOD PRESSURE: 127 MMHG | BODY MASS INDEX: 29.83 KG/M2 | OXYGEN SATURATION: 97 % | HEIGHT: 69 IN

## 2022-10-11 DIAGNOSIS — L21.9 DERMATITIS, SEBORRHEIC: Primary | ICD-10-CM

## 2022-10-11 DIAGNOSIS — I48.20 CHRONIC ATRIAL FIBRILLATION (H): ICD-10-CM

## 2022-10-11 DIAGNOSIS — I25.10 CORONARY ARTERY DISEASE INVOLVING NATIVE CORONARY ARTERY OF NATIVE HEART WITHOUT ANGINA PECTORIS: ICD-10-CM

## 2022-10-11 DIAGNOSIS — Z12.5 ENCOUNTER FOR SCREENING FOR MALIGNANT NEOPLASM OF PROSTATE: ICD-10-CM

## 2022-10-11 DIAGNOSIS — I25.10 CORONARY ARTERY DISEASE INVOLVING NATIVE HEART WITHOUT ANGINA PECTORIS, UNSPECIFIED VESSEL OR LESION TYPE: Primary | ICD-10-CM

## 2022-10-11 DIAGNOSIS — E78.00 HYPERCHOLESTEROLEMIA: ICD-10-CM

## 2022-10-11 DIAGNOSIS — I25.10 ATHEROSCLEROSIS OF NATIVE CORONARY ARTERY OF NATIVE HEART WITHOUT ANGINA PECTORIS: ICD-10-CM

## 2022-10-11 DIAGNOSIS — R97.20 ELEVATED PROSTATE SPECIFIC ANTIGEN (PSA): Primary | ICD-10-CM

## 2022-10-11 PROCEDURE — G0463 HOSPITAL OUTPT CLINIC VISIT: HCPCS | Mod: 25

## 2022-10-11 PROCEDURE — 93005 ELECTROCARDIOGRAM TRACING: CPT

## 2022-10-11 PROCEDURE — G0009 ADMIN PNEUMOCOCCAL VACCINE: HCPCS | Performed by: INTERNAL MEDICINE

## 2022-10-11 PROCEDURE — 90677 PCV20 VACCINE IM: CPT | Performed by: INTERNAL MEDICINE

## 2022-10-11 PROCEDURE — 99214 OFFICE O/P EST MOD 30 MIN: CPT | Mod: GC | Performed by: DERMATOLOGY

## 2022-10-11 PROCEDURE — 99213 OFFICE O/P EST LOW 20 MIN: CPT | Performed by: PHYSICIAN ASSISTANT

## 2022-10-11 PROCEDURE — 99214 OFFICE O/P EST MOD 30 MIN: CPT | Mod: 25 | Performed by: INTERNAL MEDICINE

## 2022-10-11 RX ORDER — SOTALOL HYDROCHLORIDE 80 MG/1
80 TABLET ORAL 2 TIMES DAILY
Qty: 180 TABLET | Refills: 3 | Status: SHIPPED | OUTPATIENT
Start: 2022-10-11 | End: 2023-10-10

## 2022-10-11 RX ORDER — ZINC GLUCONATE 50 MG
50 TABLET ORAL DAILY
COMMUNITY

## 2022-10-11 RX ORDER — ROSUVASTATIN CALCIUM 40 MG/1
40 TABLET, COATED ORAL DAILY
Qty: 90 TABLET | Refills: 3 | Status: SHIPPED | OUTPATIENT
Start: 2022-10-11 | End: 2023-12-20

## 2022-10-11 RX ORDER — METOPROLOL SUCCINATE 25 MG/1
12.5 TABLET, EXTENDED RELEASE ORAL DAILY
Qty: 90 TABLET | Refills: 3 | Status: SHIPPED | OUTPATIENT
Start: 2022-10-11 | End: 2023-11-17

## 2022-10-11 RX ORDER — KETOCONAZOLE 20 MG/ML
SHAMPOO TOPICAL DAILY PRN
Qty: 120 ML | Refills: 11 | Status: SHIPPED | OUTPATIENT
Start: 2022-10-11 | End: 2023-10-31

## 2022-10-11 RX ORDER — SOTALOL HYDROCHLORIDE 80 MG/1
TABLET ORAL
Qty: 180 TABLET | Refills: 3 | OUTPATIENT
Start: 2022-10-11

## 2022-10-11 RX ORDER — KETOCONAZOLE 20 MG/G
CREAM TOPICAL DAILY
Qty: 60 G | Refills: 11 | Status: SHIPPED | OUTPATIENT
Start: 2022-10-11 | End: 2023-10-31

## 2022-10-11 ASSESSMENT — PAIN SCALES - GENERAL
PAINLEVEL: NO PAIN (0)
PAINLEVEL: NO PAIN (0)

## 2022-10-11 NOTE — NURSING NOTE
Chief Complaint   Patient presents with     Derm Problem     Annual skin check - No areas of concern     Alexis Sanders, EMT

## 2022-10-11 NOTE — PATIENT INSTRUCTIONS
Visit Summary:    Today we discussed:   No medication changes today.  I have refilled your heart medications x 1 year.     Follow up:   With a new cardiologist in 1 year, or sooner if needed.

## 2022-10-11 NOTE — NURSING NOTE
Jonathon Guerra is a 77 year old male patient that presents today in clinic for the following:    Chief Complaint   Patient presents with     Follow Up     Would like to discuss cardiology referral     The patient's allergies and medications were reviewed as noted. A set of vitals were recorded as noted without incident. The patient does not have any other questions for the provider.    Jaz Lopez, EMT at 7:23 AM on 10/11/2022   Gabapentin Counseling: I discussed with the patient the risks of gabapentin including but not limited to dizziness, somnolence, fatigue and ataxia.

## 2022-10-11 NOTE — NURSING NOTE
Chief Complaint   Patient presents with     Follow Up     self referred for CAD, atrial fibrillation, hyperlipidemia, hypertension      Vitals were taken and medications were reconciled. EKG was performed   FLAKO Ivan  8:36 AM

## 2022-10-11 NOTE — LETTER
"10/11/2022       RE: Jonathon Guerra  [address]   [place] [address]     Dear Colleague,    Thank you for referring your patient, Jonathon Guerra, to the [facility] [place] at [facility]. Please see a copy of my visit note below.    [facility] Note  Encounter Date: Oct 11, 2022  Office Visit     Dermatology Problem List:  FBSE 10/11/22   # Seborrheic dermatitis, scalp. Ketoconazole shampoo.   # Tinea pedis with onychomycosis. 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 No treatment is necessary at this time unless these lesions change or become symptomatic.   - Sun precaution was advised including the use of sun screens of SPF 30 or higher, sun protective clothing, and avoidance of tanning beds.    # Per last note 10/20/21 : \"History of eruption on lower legs and posterior scalp, Ddx dermatitis vs. Plaque psoriasis. Resolved with triamcinolone 0.1% cream. Will refill this if needed.\"    Procedures Performed:   None    Follow-up: 1 year(s) in-person, or earlier for new or changing lesions    Staff and Resident:     Sheila Cage MD PGY4    I, Elsa English MD, saw this patient with the resident and agree with the resident s findings and plan of care as documented in the resident s note.    ____________________________________________    CC: Derm Problem (Annual skin check - No areas of concern)      HPI:  Mr. Jonathon Guerra is a(n) 77 year old male who presents today as a new " patient for FBSE. Recently traveling in [facility]. Reports occasional itch and/or tenderness of scalp. Reports he has been treated with cryotherapy to scattered spots on his face in the past. He denies a family or personal history of skin cancer. Patient is otherwise feeling well, without additional skin concerns.    Labs Reviewed:  N/A    Physical Exam:  Vitals: There were no vitals taken for this visit.  SKIN: Full skin, which includes the head/face, both arms, chest, back, abdomen,both legs, genitalia and/or groin buttocks, digits and/or nails, was examined.  - Scattered hypopigmented macules to the forearms.  - There are waxy stuck on tan to brown papules on the trunk and extremities.   -There are pink scaly plaques to bilateral feet in a moccasin like distribution and interdigital scale. Toenail plates are yellow,hypertrophic with subungual debris.   - Minimal erythema of scalp with mild loosely adherent scale   - No other lesions of concern on areas examined.     Medications:  Current Outpatient Medications   Medication     Acetaminophen 650 MG TABS     apixaban ANTICOAGULANT (ELIQUIS) 5 MG tablet     cholecalciferol (VITAMIN D-1000 MAX ST) 25 MCG (1000 UT) TABS     Coenzyme Q10-Vitamin E (QUNOL ULTRA COQ10 PO)     Diphenhydramine-APAP, sleep, (ACETAMINOPHEN PM PO)     FLUZONE HIGH-DOSE QUADRIVALENT 0.7 ML LAVON injection     ketoconazole (NIZORAL) 2 % external cream     levothyroxine (SYNTHROID/LEVOTHROID) 88 MCG tablet     Melatonin 10 MG TABS tablet     metoprolol succinate ER (TOPROL XL) 25 MG 24 hr tablet     Multiple Vitamin (MULTI-VITAMIN PO)     rosuvastatin (CRESTOR) 40 MG tablet     saw palmetto 450 MG CAPS capsule     sotalol (BETAPACE) 80 MG tablet     triamcinolone (KENALOG) 0.1 % external cream     vitamin B-Complex     zinc gluconate 50 MG tablet     No current facility-administered medications for this visit.      Past Medical History:   Patient Active Problem List   Diagnosis     Elevated  prostate specific antigen (PSA)     Hypothyroidism     Atrial fibrillation (H)     Insomnia     Gastroesophageal reflux disease without esophagitis     Hyperlipidemia     Impotence of organic origin     Urticaria     Arthropathy, lower leg     Advance Care Planning     Hyperlipidemia     Coronary artery disease     Tinea corporis     Pure hypercholesterolemia     SUKH (obstructive sleep apnea)     Benign paroxysmal positional vertigo, right     Sciatica without back pain, left     Abdominal aortic aneurysm (AAA) without rupture     Past Medical History:   Diagnosis Date     Atrial fibrillation (H)      Coronary artery disease      Hyperlipidemia      Hypertension      Thyroid disease

## 2022-10-11 NOTE — LETTER
10/11/2022      RE: Jonathon Guerra  [address]   [facility] [id number]       Dear Colleague,    Thank you for the opportunity to participate in the care of your patient, Jonathon Guerra, at the [facility] [place] [facility]. Please see a copy of my visit note below.          Cardiology Progress Note    Date of Service: 10/11/22    Reason for visit: Annual follow up, nonobstructive CAD, atrial fibrillation.    Primary cardiologist: Dr. Aguilera      HPI:  Mr. Guerra is a very pleasant 77 year old male with a PMHx including hyperlipidemia, paroxysmal atrial fibrillation on AC, previous tobacco use (quit >20 yrs ago) and nonobstructive CAD based on a coronary CT in 2006. His afib hx dates back to 2006 when he required multiple cardioversions, and ultimately was placed on sotalol. He has done well since that time. He has remained very active, and most recent ischemic evaluation via STECHO in 2018 with above exercise capacity and no ischemia. He was seen last by Kristy Velasquez CNP in Oct of 2021 for an annual visit at which time he was feeling well and no changes were made.     Today, I am meeting Jonathon in lab for a routine annual visit. He tells me that he continues to feel well with no new concerns. He walks daily for long periods (in fact, walked 3 miles to his appt today [place]) and has no ALMONTE, chest discomfort, dizziness, or presyncope. He has no new LE edema or orthopnea. He has not felt any palpitations and does not think he has had any afib episodes for over 10 years.     The patient did not have any new labs performed for our visit today, but most recent available labs were reviewed as below.       ASSESSMENT/PLAN:    1. Nonobstructive coronary disease.   --History of mild to moderate nonocclusive CAD based on prior CT imaging in 2006. His most recent evaluation was via STECHO in 2018 at which time he had  above average exercise capacity and no ischemia. He is free of angina with no ne concerning ischemic symptoms.   --He is not on ASA while on AC as below.   --BP control is very good, continue metoprolol as is.   --Continue rosuvastatin 40mg daily. Last lipid profile from April was excellent, with an LDL of 33.    --I encouraged him to remain active as he is doing, and a continued healthy lifestyle was encouraged.      2. Paroxysmal atrial fibrillation.   --Initially noted in 2006 at which time he required multiple cardioversions. He was subsequently placed on sotalol with good result. He has not had any symptomatic recurrence for over 10 years. EKG today confirms normal sinus rhythm with a HR of 61.    --Continue apixiban, for [id number] score of 3. No bleeding concerns.       Follow up plan: Return in 1 year to establish with a new general cardiologist. I asked him to call sooner with any new concerns.       Orders this Visit:  Orders Placed This Encounter   Procedures     Follow-Up with Cardiology     EKG 12-lead, tracing only (Same Day)     Orders Placed This Encounter   Medications     apixaban ANTICOAGULANT (ELIQUIS) 5 MG tablet     Sig: Take 1 tablet (5 mg) by mouth 2 times daily     Dispense:  180 tablet     Refill:  3     metoprolol succinate ER (TOPROL XL) 25 MG 24 hr tablet     Sig: Take 0.5 tablets (12.5 mg) by mouth daily     Dispense:  90 tablet     Refill:  3     rosuvastatin (CRESTOR) 40 MG tablet     Sig: Take 1 tablet (40 mg) by mouth daily     Dispense:  90 tablet     Refill:  3     sotalol (BETAPACE) 80 MG tablet     Sig: Take 1 tablet (80 mg) by mouth 2 times daily     Dispense:  180 tablet     Refill:  3     Medications Discontinued During This Encounter   Medication Reason     apixaban ANTICOAGULANT (ELIQUIS) 5 MG tablet Reorder     metoprolol succinate ER (TOPROL-XL) 25 MG 24 hr tablet Reorder     rosuvastatin (CRESTOR) 40 MG tablet Reorder     sotalol (BETAPACE) 80 MG tablet Reorder            CURRENT MEDICATIONS:  Current Outpatient Medications   Medication Sig Dispense Refill     Acetaminophen 650 MG TABS Take by mouth daily       apixaban ANTICOAGULANT (ELIQUIS) 5 MG tablet Take 1 tablet (5 mg) by mouth 2 times daily 180 tablet 3     cholecalciferol (VITAMIN D-1000 MAX ST) 25 MCG (1000 UT) TABS Take 2 tablets by mouth daily 180 tablet 3     Coenzyme Q10-Vitamin E (QUNOL ULTRA COQ10 PO)        Diphenhydramine-APAP, sleep, (ACETAMINOPHEN PM PO) Take 500 mg by mouth       FLUZONE HIGH-DOSE QUADRIVALENT 0.7 ML LAVON injection Pt had flu shot 8/25       ketoconazole (NIZORAL) 2 % external cream Apply to the feet and inbetween the toes daily x 1 month then as needed. 60 g 3     levothyroxine (SYNTHROID/LEVOTHROID) 88 MCG tablet Take 1 tablet (88 mcg) by mouth daily 90 tablet 3     Melatonin 10 MG TABS tablet Take 10 mg by mouth       metoprolol succinate ER (TOPROL XL) 25 MG 24 hr tablet Take 0.5 tablets (12.5 mg) by mouth daily 90 tablet 3     Multiple Vitamin (MULTI-VITAMIN PO) Take by mouth daily        rosuvastatin (CRESTOR) 40 MG tablet Take 1 tablet (40 mg) by mouth daily 90 tablet 3     saw palmetto 450 MG CAPS capsule        sotalol (BETAPACE) 80 MG tablet Take 1 tablet (80 mg) by mouth 2 times daily 180 tablet 3     triamcinolone (KENALOG) 0.1 % external cream APPLY TO AFFECTED AREA on scalp and lower legs as needed twice daily until rashes clear 45 g 1     vitamin B-Complex Take 1 tablet by mouth daily       zinc gluconate 50 MG tablet Take 1 tablet (50 mg) by mouth daily         ALLERGIES     Allergies   Allergen Reactions     Atorvastatin      Lipitor--muscle aches       PAST MEDICAL HISTORY:  Past Medical History:   Diagnosis Date     Atrial fibrillation (H)      Coronary artery disease      Hyperlipidemia      Hypertension      Thyroid disease          Review of Systems:  Cardiovascular: negative for chest pain, palpitations, orthopnea, LE edema  Constitutional: negative for chills,  "sweats, fevers   Resp: Negative for dyspnea at rest, dyspnea on exertion, known chronic lung disease  HEENT: Negative for new visual changes, frequent headaches  Gastrointestinal: negative for abdominal pain, diarrhea, nausea, vomiting  Hematologic/lymphatic: negative for current systemic anticoagulation, hx of blood clots  Neurological: negative for focal weakness, LOC, seizures, syncope/presyncope       Physical Exam:  Vitals: /85 (BP Location: Left arm, Patient Position: Sitting, Cuff Size: Adult Regular)   Pulse 61   Ht 1.74 m (5' 8.5\")   Wt 91.4 kg (201 lb 6.4 oz)   SpO2 97%   BMI 30.17 kg/m     Wt Readings from Last 4 Encounters:   10/11/22 91.4 kg (201 lb 6.4 oz)   10/11/22 91.2 kg (201 lb)   04/11/22 88.5 kg (195 lb)   11/15/21 93.4 kg (206 lb)       GEN:  In general, this is a well nourished fit appearing  male in no acute distress on RA.  Patient ambulatory, unaccompanied.   HEENT:  Pupils grossly equal, sclerae nonicteric.   NECK: Supple, trachea midline. No JVD. No carotid bruits.  C/V:  Regular rate and rhythm, no murmur, rub or gallop.   RESP: Respirations are unlabored. No use of accessory muscles. Clear to auscultation bilaterally without wheezing, rales, or rhonchi.  EXTREM: No LE edema.   NEURO: Alert and oriented, cooperative. Gait not formally assessed. No obvious focal deficits.   SKIN: Warm and dry.       Recent Lab Results:  LIPID RESULTS:  Lab Results   Component Value Date    CHOL 110 04/11/2022    CHOL 118 10/30/2020    HDL 57 04/11/2022    HDL 59 10/30/2020    LDL 33 04/11/2022    LDL 49 10/30/2020    TRIG 100 04/11/2022    TRIG 52 10/30/2020    CHOLHDLRATIO 2.8 05/12/2015       LIVER ENZYME RESULTS:  Lab Results   Component Value Date    AST 23 04/11/2022    AST 18 01/12/2021    ALT 31 04/11/2022    ALT 27 01/12/2021       CBC RESULTS:  Lab Results   Component Value Date    WBC 5.7 10/05/2022    WBC 5.6 01/12/2021    RBC 4.49 10/05/2022    RBC 4.45 01/12/2021    HGB " 14.1 10/05/2022    HGB 13.7 01/12/2021    HCT 41.4 10/05/2022    HCT 42.3 01/12/2021    MCV 92 10/05/2022    MCV 95 01/12/2021    MCH 31.4 10/05/2022    MCH 30.8 01/12/2021    MCHC 34.1 10/05/2022    MCHC 32.4 01/12/2021    RDW 12.8 10/05/2022    RDW 12.8 01/12/2021     10/05/2022     01/12/2021       BMP RESULTS:  Lab Results   Component Value Date     04/11/2022     01/12/2021    POTASSIUM 4.1 04/11/2022    POTASSIUM 4.0 01/12/2021    CHLORIDE 106 04/11/2022    CHLORIDE 108 01/12/2021    CO2 30 04/11/2022    CO2 32 01/12/2021    ANIONGAP 6 04/11/2022    ANIONGAP 2 (L) 01/12/2021     (H) 04/11/2022    GLC 97 01/12/2021    BUN 18 04/11/2022    BUN 20 01/12/2021    CR 0.77 04/11/2022    CR 0.96 01/12/2021    GFRESTIMATED >90 04/11/2022    GFRESTIMATED 78 01/12/2021    GFRESTBLACK >90 01/12/2021    MARTELL 9.2 04/11/2022    MARTELL 9.1 01/12/2021        25 total minutes was spent today including chart review, precharting, history and exam, post visit documentation, and reviewing studies as outlined above.     Quiana Daley PA-C  [facility]  Pager [phone]

## 2022-10-11 NOTE — NURSING NOTE
Jonathon Guerra received the Prevnar 20 vaccine in clinic today at the request of Dr. Walker. The immunization site was cleaned with an alcohol prep wipe. The immunization was given without incident--see immunization list for administration details. No swelling or redness was observed at the site of injection after the immunization was given. Pt has no history of reaction to vaccines.     Jaz Lopez, EMT at 7:52 AM on 10/11/2022

## 2022-10-11 NOTE — PROGRESS NOTES
"[facility] Dermatology Note  Encounter Date: Oct 11, 2022  Office Visit     Dermatology Problem List:  FBSE 10/11/22   # Seborrheic dermatitis, scalp. Ketoconazole shampoo.   # Tinea pedis with onychomycosis. 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 No treatment is necessary at this time unless these lesions change or become symptomatic.   - Sun precaution was advised including the use of sun screens of SPF 30 or higher, sun protective clothing, and avoidance of tanning beds.    # Per last note 10/20/21 : \"History of eruption on lower legs and posterior scalp, Ddx dermatitis vs. Plaque psoriasis. Resolved with triamcinolone 0.1% cream. Will refill this if needed.\"    Procedures Performed:   None    Follow-up: 1 year(s) in-person, or earlier for new or changing lesions    Staff and Resident:     Sheila Cage MD PGY4    I, Elsa English MD, saw this patient with the resident and agree with the resident s findings and plan of care as documented in the resident s note.    ____________________________________________    CC: Derm Problem (Annual skin check - No areas of concern)      HPI:  Mr. Jonathon Guerra is a(n) 77 year old male who presents today as a new patient for FBSE. Recently traveling in hospitals. Reports occasional itch and/or tenderness of scalp. Reports he has been treated with cryotherapy to scattered spots on his face in the past. He denies a family or personal history of skin cancer. Patient is otherwise feeling well, without additional skin concerns.    Labs " Reviewed:  N/A    Physical Exam:  Vitals: There were no vitals taken for this visit.  SKIN: Full skin, which includes the head/face, both arms, chest, back, abdomen,both legs, genitalia and/or groin buttocks, digits and/or nails, was examined.  - Scattered hypopigmented macules to the forearms.  - There are waxy stuck on tan to brown papules on the trunk and extremities.   -There are pink scaly plaques to bilateral feet in a moccasin like distribution and interdigital scale. Toenail plates are yellow,hypertrophic with subungual debris.   - Minimal erythema of scalp with mild loosely adherent scale   - No other lesions of concern on areas examined.     Medications:  Current Outpatient Medications   Medication     Acetaminophen 650 MG TABS     apixaban ANTICOAGULANT (ELIQUIS) 5 MG tablet     cholecalciferol (VITAMIN D-1000 MAX ST) 25 MCG (1000 UT) TABS     Coenzyme Q10-Vitamin E (QUNOL ULTRA COQ10 PO)     Diphenhydramine-APAP, sleep, (ACETAMINOPHEN PM PO)     FLUZONE HIGH-DOSE QUADRIVALENT 0.7 ML LAVON injection     ketoconazole (NIZORAL) 2 % external cream     levothyroxine (SYNTHROID/LEVOTHROID) 88 MCG tablet     Melatonin 10 MG TABS tablet     metoprolol succinate ER (TOPROL XL) 25 MG 24 hr tablet     Multiple Vitamin (MULTI-VITAMIN PO)     rosuvastatin (CRESTOR) 40 MG tablet     saw palmetto 450 MG CAPS capsule     sotalol (BETAPACE) 80 MG tablet     triamcinolone (KENALOG) 0.1 % external cream     vitamin B-Complex     zinc gluconate 50 MG tablet     No current facility-administered medications for this visit.      Past Medical History:   Patient Active Problem List   Diagnosis     Elevated prostate specific antigen (PSA)     Hypothyroidism     Atrial fibrillation (H)     Insomnia     Gastroesophageal reflux disease without esophagitis     Hyperlipidemia     Impotence of organic origin     Urticaria     Arthropathy, lower leg     Advance Care Planning     Hyperlipidemia     Coronary artery disease     Tinea  corporis     Pure hypercholesterolemia     SUKH (obstructive sleep apnea)     Benign paroxysmal positional vertigo, right     Sciatica without back pain, left     Abdominal aortic aneurysm (AAA) without rupture     Past Medical History:   Diagnosis Date     Atrial fibrillation (H)      Coronary artery disease      Hyperlipidemia      Hypertension      Thyroid disease

## 2022-10-12 ENCOUNTER — OFFICE VISIT (OUTPATIENT)
Dept: OPHTHALMOLOGY | Facility: CLINIC | Age: 77
End: 2022-10-12
Attending: OPHTHALMOLOGY
Payer: COMMERCIAL

## 2022-10-12 DIAGNOSIS — H04.123 DRY EYE SYNDROME OF BOTH EYES: ICD-10-CM

## 2022-10-12 DIAGNOSIS — H35.89 RPE MOTTLING OF MACULA: ICD-10-CM

## 2022-10-12 DIAGNOSIS — H25.13 NUCLEAR SENILE CATARACT OF BOTH EYES: ICD-10-CM

## 2022-10-12 DIAGNOSIS — H52.13 MYOPIA OF BOTH EYES: Primary | ICD-10-CM

## 2022-10-12 DIAGNOSIS — H43.813 VITREOUS DETACHMENT OF BOTH EYES: ICD-10-CM

## 2022-10-12 LAB
ATRIAL RATE - MUSE: 61 BPM
DIASTOLIC BLOOD PRESSURE - MUSE: NORMAL MMHG
INTERPRETATION ECG - MUSE: NORMAL
P AXIS - MUSE: 8 DEGREES
PR INTERVAL - MUSE: 168 MS
QRS DURATION - MUSE: 84 MS
QT - MUSE: 426 MS
QTC - MUSE: 428 MS
R AXIS - MUSE: 3 DEGREES
SYSTOLIC BLOOD PRESSURE - MUSE: NORMAL MMHG
T AXIS - MUSE: 7 DEGREES
VENTRICULAR RATE- MUSE: 61 BPM

## 2022-10-12 PROCEDURE — 92250 FUNDUS PHOTOGRAPHY W/I&R: CPT | Performed by: OPHTHALMOLOGY

## 2022-10-12 PROCEDURE — 99214 OFFICE O/P EST MOD 30 MIN: CPT | Performed by: OPHTHALMOLOGY

## 2022-10-12 PROCEDURE — 92134 CPTRZ OPH DX IMG PST SGM RTA: CPT | Performed by: OPHTHALMOLOGY

## 2022-10-12 PROCEDURE — G0463 HOSPITAL OUTPT CLINIC VISIT: HCPCS | Mod: 25

## 2022-10-12 PROCEDURE — 92015 DETERMINE REFRACTIVE STATE: CPT

## 2022-10-12 ASSESSMENT — CONF VISUAL FIELD
OD_INFERIOR_NASAL_RESTRICTION: 0
OD_NORMAL: 1
OS_SUPERIOR_NASAL_RESTRICTION: 0
OD_INFERIOR_TEMPORAL_RESTRICTION: 0
OD_SUPERIOR_NASAL_RESTRICTION: 0
OS_NORMAL: 1
OS_INFERIOR_TEMPORAL_RESTRICTION: 0
OS_INFERIOR_NASAL_RESTRICTION: 0
OS_SUPERIOR_TEMPORAL_RESTRICTION: 0
OD_SUPERIOR_TEMPORAL_RESTRICTION: 0

## 2022-10-12 ASSESSMENT — REFRACTION_WEARINGRX
OS_AXIS: 008
OS_CYLINDER: +1.50
OS_SPHERE: -1.25
OS_ADD: +2.25
OD_ADD: +2.25
SPECS_TYPE: PAL
OD_AXIS: 178
OD_SPHERE: -0.50
OD_CYLINDER: +1.00

## 2022-10-12 ASSESSMENT — VISUAL ACUITY
OS_CC: 20/25
CORRECTION_TYPE: GLASSES
OS_CC+: -1+2
METHOD: SNELLEN - LINEAR
OD_CC+: -2
OD_CC: 20/20

## 2022-10-12 ASSESSMENT — TONOMETRY
OS_IOP_MMHG: 19
OD_IOP_MMHG: 21
IOP_METHOD: TONOPEN

## 2022-10-12 ASSESSMENT — REFRACTION_MANIFEST
OD_AXIS: 180
OS_CYLINDER: +1.25
OS_ADD: +2.50
OS_SPHERE: -1.25
OD_SPHERE: -0.50
OD_CYLINDER: +1.25
OS_AXIS: 180
OD_ADD: +2.50

## 2022-10-12 ASSESSMENT — EXTERNAL EXAM - RIGHT EYE: OD_EXAM: NORMAL

## 2022-10-12 ASSESSMENT — EXTERNAL EXAM - LEFT EYE: OS_EXAM: NORMAL

## 2022-10-12 ASSESSMENT — SLIT LAMP EXAM - LIDS
COMMENTS: NORMAL
COMMENTS: NORMAL

## 2022-10-12 NOTE — PROGRESS NOTES
HPI:  Patient referred by Dr. Roman for retinal evaluation. Vision is stable  Dr. 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 Patient walks to the eye clinic from [facility]. Walks between 5-8 miles per day. Walks about 20k steps per day.      Family history of macular degeneration - aunt and 3 cousins        Assessment and Plan:  # prominent vortex vein ampulla each eye  No peripheral retinal lesions in 360 depressed exam  Discussed the benign nature of condition and no need for tx  Annual follow ups with Dr. Roman    #  Myopia, both eyes.   comfortable with current glasses     # Cataract, both eyes.   Not visually significant. Monitoir.     # Dry Eye Syndrome, both eyes.   Continue preservative free artificial tears 4 times daily both eyes.     # Macular Pigmentary Changes, left eye.     Not a smoker.    Recommend UV protection.     Recommend fish and green leafy vegetables 2-3 days per week.     Macular OCT 09/14/2022: Both eyes: no SRF, normal. Pigmentary macular changes in the left eye seen on fundus exam.     Recommend annual dilated eye exam with macular OCT     #  Posterior Vitreous Detachment, both eyes. Retinas attached.   Educated on signs and symptoms of a retinal detachment       Complete documentation of historical and exam elements from today's encounter can be found in the full encounter summary report (not reduplicated in this progress note). I personally obtained the chief complaint(s) and history of present illness.  I confirmed and edited as necessary the review of systems, past medical/surgical history, family history, social history, and examination findings as documented by others; and I examined the patient myself. I personally reviewed the relevant tests, images, and reports as documented above. I formulated and edited as necessary the assessment and plan and discussed the findings and management plan with the patient  and family.    Steven Cartwright MD, PhD

## 2022-10-12 NOTE — NURSING NOTE
Chief Complaints and History of Present Illnesses   Patient presents with     Retinal Evaluation     RPE mottling of macula           Chief Complaint(s) and History of Present Illness(es)     Retinal Evaluation            Comments: RPE mottling of macula                Comments    Pt states no change in VA since last visit  Floater, not new  No flashes, eye pain or headaches    Cookie Lovett COT 8:16 AM October 12, 2022

## 2022-11-21 NOTE — PROGRESS NOTES
HPI  Jonathon Guerra is a 75 year old male here for a complete eye exam. Overall distance vision is good. Having more trouble at near, especially when doing crossword puzzles. Has more tearing lately, especially in the morning when doing his crosswords. No flashes or floaters, no eye pain.     Last eye exam 10/2019 - [facility]     Past Ocular History:  - Previous eye diagnoses:    - Occipital migraines   - Myopia with astigmatism   - Eye or eyelid surgery: None  - Eye trauma: None  - DM or HTN: None  - FHx glaucoma or AMD: paternal side with AMD    PMHx: Atrial fibrillation (on 325mg ASA, metoprolol), HLD       Assessment & Plan    # Myopia with astigmatism and presbyopia  - New MRx today - BCVA 20/20     # Mild mixed cataracts each eye   - Not visually significant, but reports glare at night  - Monitor   -----------------------------------------------------------------------------------    Patient disposition:   Return in about 1 year (around 9/10/2021) for Annual Visit. or sooner as needed.    Jerman Hummel MD  Ophthalmology Resident  PGY-4    Attending Physician Attestation:  Complete documentation of historical and exam elements from today's encounter can be found in the full encounter summary report (not reduplicated in this progress note).  I personally obtained the chief complaint(s) and history of present illness.  I confirmed and edited as necessary the review of systems, past medical/surgical history, family history, social history, and examination findings as documented by others; and I examined the patient myself.  I personally reviewed the relevant tests, images, and reports as documented above.  I formulated and edited as necessary the assessment and plan and discussed the findings and management plan with the patient and family. . - Dru Lovett MD        Patient Care Conference   I discussed this patient's case with  and their pertinent findings during our weekly Patient Care Conference. I presented their medical history, relevant labs, and imaging to the physician. We discussed their plan of care and current assessment.     Clinical picture: worsening inflammation, hepatocellular, with potential steatosis and ferritin.    Prior serologic workup:   Lab Results   Component Value Date    SMOOTHMUSCAB Negative 1:40 10/26/2022    AMAIFA Negative 1:40 10/26/2022    IGGSERUM 1582 11/16/2022    ANASCREEN Negative <1:80 10/26/2022    FERRITIN 304 (H) 10/26/2022    FESATURATED 24 10/26/2022    CERULOPLSM 31.0 10/26/2022    HEPBSAG Non-reactive 10/26/2022    HEPCAB Non-reactive 10/26/2022     Lab Results   Component Value Date     (H) 11/16/2022    AST 92 (H) 11/16/2022    ALKPHOS 109 11/16/2022    BILITOT 0.4 11/16/2022    ALBUMIN 4.0 11/16/2022     10/12/2022       DISCUSSION:   Agree with current plans, no changes.  Pending Fibroscan, would biopsy if any fibrosis or concerns.   Patient does not have many metabolic risk factors for fatty liver but has strong fhx liver dz    No AIH positives.

## 2022-12-06 DIAGNOSIS — E03.9 ACQUIRED HYPOTHYROIDISM: ICD-10-CM

## 2022-12-09 RX ORDER — LEVOTHYROXINE SODIUM 88 UG/1
88 TABLET ORAL DAILY
Qty: 90 TABLET | Refills: 2 | Status: SHIPPED | OUTPATIENT
Start: 2022-12-09 | End: 2024-06-07

## 2022-12-09 NOTE — TELEPHONE ENCOUNTER
LEVOTHYROXINE 88 MCG TABLET    Passed Protocol  Last office visit:  10/11/2022  [facility] Internal Medicine Rafy Han MD  Internal Medicine

## 2023-04-02 ENCOUNTER — HEALTH MAINTENANCE LETTER (OUTPATIENT)
Age: 78
End: 2023-04-02

## 2023-04-09 NOTE — PROGRESS NOTES
HPI:    Overall doing quite well. He walks 5-6 miles several times a week w/o problems. He specifically denies any new HEENT, cardiopulmonary, abdominal, , neurological, systemic, psychiatric, lymphatic, endocrine, vascular complaints,     Past Medical History:   Diagnosis Date     Atrial fibrillation (H)      Coronary artery disease      Hyperlipidemia      Hypertension      Thyroid disease      Past Surgical History:   Procedure Laterality Date     HERNIA REPAIR, INGUINAL RT/LT       OTHER SURGICAL HISTORY      upper and lower partial plate     PE:    Vitals noted, gen nad, cooperative, alert, neck supple nl rom, no B Carotid bruits, lungs with good air movement, RRR, S1, S2, no MRG, abdomen, no acute findings. Grossly normal neurological exam. No B LE swelling.     A/P:     1.  Immunizations; Moderna COVID vaccine x 4. Bi-valent COVID 10/19/2022.  Influenza vaccine 10/11/2022.   Prevnar 13. done 8/2/2017, Tdap 10/28/2014. He has completed s Shinrix Zoster vaccine series.   Pneumococcal 23 done 10/28/2014. Prevnar 20 vaccine done 10/1/2022  2. On Eliquis for afib and Metoprolol and Sotalol . Cardiology visit 10/28/2021 with Ms. Velasquez. He saw Ms. Daley 10/11/2022. Ordered future resting echo today and he will schedule a cardiology appt. He used to see Dr. Aguilera   3. On thyroid replacement; TSH  was 1.91 on 4/9/2022. Ordered TSH 4/9/2023  4. Increased lipids on Rosuvastatin. Lipid panel 4/11/2022 TG's 100, HDL 57 and LDL 33  5. PSA 10/5/2022 2.87  6. Dermatology skin check 10/11/2022 with  Dr. English. He states he will schedule for the fall with Dr. English.   7. Colonoscopy 7/1/2020.   8. Repeat CBC 10/5/2022 normal platelets at 155.   9. Ophthalmology appt. On 10/12/2022 and scheduled for 9/19/2023     30 minutes spent on the date of the encounter doing chart review, history and exam, documentation and further activities as noted above

## 2023-04-10 ENCOUNTER — OFFICE VISIT (OUTPATIENT)
Dept: INTERNAL MEDICINE | Facility: CLINIC | Age: 78
End: 2023-04-10
Payer: COMMERCIAL

## 2023-04-10 ENCOUNTER — LAB (OUTPATIENT)
Dept: LAB | Facility: CLINIC | Age: 78
End: 2023-04-10
Payer: COMMERCIAL

## 2023-04-10 VITALS
WEIGHT: 204.5 LBS | BODY MASS INDEX: 32.1 KG/M2 | HEART RATE: 68 BPM | SYSTOLIC BLOOD PRESSURE: 123 MMHG | OXYGEN SATURATION: 95 % | HEIGHT: 67 IN | DIASTOLIC BLOOD PRESSURE: 81 MMHG

## 2023-04-10 DIAGNOSIS — R94.6 THYROID FUNCTION TEST ABNORMAL: ICD-10-CM

## 2023-04-10 DIAGNOSIS — E78.00 HIGH BLOOD CHOLESTEROL: ICD-10-CM

## 2023-04-10 DIAGNOSIS — I48.91 ATRIAL FIBRILLATION, UNSPECIFIED TYPE (H): ICD-10-CM

## 2023-04-10 DIAGNOSIS — E78.00 HIGH BLOOD CHOLESTEROL: Primary | ICD-10-CM

## 2023-04-10 DIAGNOSIS — Z12.5 ENCOUNTER FOR SCREENING FOR MALIGNANT NEOPLASM OF PROSTATE: ICD-10-CM

## 2023-04-10 LAB
ALBUMIN SERPL BCG-MCNC: 4.6 G/DL (ref 3.5–5.2)
ALP SERPL-CCNC: 36 U/L (ref 40–129)
ALT SERPL W P-5'-P-CCNC: 11 U/L (ref 10–50)
ANION GAP SERPL CALCULATED.3IONS-SCNC: 9 MMOL/L (ref 7–15)
AST SERPL W P-5'-P-CCNC: 21 U/L (ref 10–50)
BASOPHILS # BLD AUTO: 0 10E3/UL (ref 0–0.2)
BASOPHILS NFR BLD AUTO: 0 %
BILIRUB SERPL-MCNC: 0.4 MG/DL
BUN SERPL-MCNC: 19 MG/DL (ref 8–23)
CALCIUM SERPL-MCNC: 9.9 MG/DL (ref 8.8–10.2)
CHLORIDE SERPL-SCNC: 105 MMOL/L (ref 98–107)
CHOLEST SERPL-MCNC: 120 MG/DL
CREAT SERPL-MCNC: 0.96 MG/DL (ref 0.67–1.17)
DEPRECATED HCO3 PLAS-SCNC: 27 MMOL/L (ref 22–29)
EOSINOPHIL # BLD AUTO: 0 10E3/UL (ref 0–0.7)
EOSINOPHIL NFR BLD AUTO: 1 %
ERYTHROCYTE [DISTWIDTH] IN BLOOD BY AUTOMATED COUNT: 12.9 % (ref 10–15)
GFR SERPL CREATININE-BSD FRML MDRD: 82 ML/MIN/1.73M2
GLUCOSE SERPL-MCNC: 103 MG/DL (ref 70–99)
HCT VFR BLD AUTO: 43.1 % (ref 40–53)
HDLC SERPL-MCNC: 56 MG/DL
HGB BLD-MCNC: 14.6 G/DL (ref 13.3–17.7)
IMM GRANULOCYTES # BLD: 0.1 10E3/UL
IMM GRANULOCYTES NFR BLD: 1 %
LDLC SERPL CALC-MCNC: 42 MG/DL
LYMPHOCYTES # BLD AUTO: 1.3 10E3/UL (ref 0.8–5.3)
LYMPHOCYTES NFR BLD AUTO: 30 %
MCH RBC QN AUTO: 31.3 PG (ref 26.5–33)
MCHC RBC AUTO-ENTMCNC: 33.9 G/DL (ref 31.5–36.5)
MCV RBC AUTO: 93 FL (ref 78–100)
MONOCYTES # BLD AUTO: 0.6 10E3/UL (ref 0–1.3)
MONOCYTES NFR BLD AUTO: 13 %
NEUTROPHILS # BLD AUTO: 2.3 10E3/UL (ref 1.6–8.3)
NEUTROPHILS NFR BLD AUTO: 55 %
NONHDLC SERPL-MCNC: 64 MG/DL
NRBC # BLD AUTO: 0 10E3/UL
NRBC BLD AUTO-RTO: 0 /100
PLATELET # BLD AUTO: 147 10E3/UL (ref 150–450)
POTASSIUM SERPL-SCNC: 4.8 MMOL/L (ref 3.4–5.3)
PROT SERPL-MCNC: 7.3 G/DL (ref 6.4–8.3)
PSA SERPL DL<=0.01 NG/ML-MCNC: 3.56 NG/ML (ref 0–6.5)
RBC # BLD AUTO: 4.66 10E6/UL (ref 4.4–5.9)
SODIUM SERPL-SCNC: 141 MMOL/L (ref 136–145)
TRIGL SERPL-MCNC: 112 MG/DL
TSH SERPL DL<=0.005 MIU/L-ACNC: 1.63 UIU/ML (ref 0.3–4.2)
WBC # BLD AUTO: 4.3 10E3/UL (ref 4–11)

## 2023-04-10 PROCEDURE — 80053 COMPREHEN METABOLIC PANEL: CPT | Performed by: PATHOLOGY

## 2023-04-10 PROCEDURE — 84443 ASSAY THYROID STIM HORMONE: CPT | Performed by: PATHOLOGY

## 2023-04-10 PROCEDURE — 99214 OFFICE O/P EST MOD 30 MIN: CPT | Performed by: INTERNAL MEDICINE

## 2023-04-10 PROCEDURE — 36415 COLL VENOUS BLD VENIPUNCTURE: CPT | Performed by: PATHOLOGY

## 2023-04-10 PROCEDURE — 80061 LIPID PANEL: CPT | Performed by: PATHOLOGY

## 2023-04-10 PROCEDURE — G0103 PSA SCREENING: HCPCS | Performed by: PATHOLOGY

## 2023-04-10 PROCEDURE — 85027 COMPLETE CBC AUTOMATED: CPT | Performed by: PATHOLOGY

## 2023-04-10 NOTE — NURSING NOTE
"Jonathon Guerra is a 77 year old male patient that presents today in clinic for the following:    Chief Complaint   Patient presents with     Follow Up     6 month follow up     Imm/Inj     Pt wondering if he needs any immunizations     The patient's allergies and medications were reviewed as noted. A set of vitals were recorded as noted without incident: /81 (BP Location: Right arm, Patient Position: Sitting, Cuff Size: Adult Regular)   Pulse 68   Ht 1.702 m (5' 7.01\")   Wt 92.8 kg (204 lb 8 oz)   SpO2 95%   BMI 32.02 kg/m  . The patient does not have any other questions for the provider.    Ildefonso Zheng, EMT 7:18 AM on 4/10/2023   "

## 2023-04-27 ENCOUNTER — ANCILLARY PROCEDURE (OUTPATIENT)
Dept: CARDIOLOGY | Facility: CLINIC | Age: 78
End: 2023-04-27
Attending: INTERNAL MEDICINE
Payer: COMMERCIAL

## 2023-04-27 DIAGNOSIS — Z12.5 ENCOUNTER FOR SCREENING FOR MALIGNANT NEOPLASM OF PROSTATE: ICD-10-CM

## 2023-04-27 DIAGNOSIS — E78.00 HIGH BLOOD CHOLESTEROL: ICD-10-CM

## 2023-04-27 DIAGNOSIS — R94.6 THYROID FUNCTION TEST ABNORMAL: ICD-10-CM

## 2023-04-27 DIAGNOSIS — I48.91 ATRIAL FIBRILLATION, UNSPECIFIED TYPE (H): ICD-10-CM

## 2023-04-27 LAB — LVEF ECHO: NORMAL

## 2023-04-27 PROCEDURE — 93306 TTE W/DOPPLER COMPLETE: CPT | Performed by: INTERNAL MEDICINE

## 2023-05-10 ENCOUNTER — TELEPHONE (OUTPATIENT)
Dept: OPHTHALMOLOGY | Facility: CLINIC | Age: 78
End: 2023-05-10
Payer: COMMERCIAL

## 2023-05-10 NOTE — TELEPHONE ENCOUNTER
Spoke with patient regarding Provider out of clinic and appointment will need rescheduling. [facility]uled patient accordingly [facility]

## 2023-09-14 ENCOUNTER — MYC MEDICAL ADVICE (OUTPATIENT)
Dept: INTERNAL MEDICINE | Facility: CLINIC | Age: 78
End: 2023-09-14
Payer: COMMERCIAL

## 2023-10-08 DIAGNOSIS — I25.10 CORONARY ARTERY DISEASE INVOLVING NATIVE CORONARY ARTERY OF NATIVE HEART WITHOUT ANGINA PECTORIS: ICD-10-CM

## 2023-10-10 RX ORDER — SOTALOL HYDROCHLORIDE 80 MG/1
80 TABLET ORAL 2 TIMES DAILY
Qty: 120 TABLET | Refills: 0 | Status: SHIPPED | OUTPATIENT
Start: 2023-10-10

## 2023-10-10 NOTE — TELEPHONE ENCOUNTER
sotalol (BETAPACE) 80 MG tablet 180 tablet 3 10/11/2022     Last Office Visit: 10/11/22  Future Office visit:  none    Routing refill request to provider for review/approval because:  Drug not on the cardiology  refill protocol     Minna Nathan RN

## 2023-10-31 ENCOUNTER — OFFICE VISIT (OUTPATIENT)
Dept: DERMATOLOGY | Facility: CLINIC | Age: 78
End: 2023-10-31
Payer: COMMERCIAL

## 2023-10-31 DIAGNOSIS — L21.9 DERMATITIS, SEBORRHEIC: Primary | ICD-10-CM

## 2023-10-31 DIAGNOSIS — D22.9 MULTIPLE NEVI: ICD-10-CM

## 2023-10-31 DIAGNOSIS — L82.1 SK (SEBORRHEIC KERATOSIS): ICD-10-CM

## 2023-10-31 PROCEDURE — 99213 OFFICE O/P EST LOW 20 MIN: CPT | Performed by: DERMATOLOGY

## 2023-10-31 RX ORDER — KETOCONAZOLE 20 MG/G
CREAM TOPICAL DAILY
Qty: 60 G | Refills: 11 | Status: SHIPPED | OUTPATIENT
Start: 2023-10-31

## 2023-10-31 RX ORDER — KETOCONAZOLE 20 MG/ML
SHAMPOO TOPICAL DAILY PRN
Qty: 120 ML | Refills: 11 | Status: SHIPPED | OUTPATIENT
Start: 2023-10-31

## 2023-10-31 ASSESSMENT — PAIN SCALES - GENERAL: PAINLEVEL: NO PAIN (0)

## 2023-10-31 NOTE — NURSING NOTE
Dermatology Rooming Note    Jonathon Guerra's goals for this visit include:   Chief Complaint   Patient presents with    Derm Problem     FBSE- spot of concern on L arm, thinks maybe a bruise     Shwetha Gaitan, EMT

## 2023-10-31 NOTE — PROGRESS NOTES
[facility] Dermatology Note  Encounter Date: Oct 31, 2023  Office Visit     Dermatology Problem List:  FBSE 10/31/2023  # Seborrheic dermatitis, scalp. Improved with use of Ketoconazole shampoo.   # Tinea pedis with onychomycosis. 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 Refills provided.     # Tinea pedis with onychomycosis- improved  - Continue to use Ketoconazole 1% cream prn. Refills provided.     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     # Benign lesions: Seborrheic keratoses, benign nevi, cherry angiomas. # Nevus simplex (stork bite).   -No treatment is necessary at this time unless these lesions change or become symptomatic.   - Sun precaution was advised including the use of sun screens of SPF 30 or higher, sun protective clothing, and avoidance of tanning beds.    Procedures Performed:     None      Follow-up: 1 year(s) in-person, or earlier for new or changing lesions        Sulema Pedroza MS3  Medical Student  I was present with the medical student who participated in the service and in the documentation of the note. I have verified the history and personally performed the physical exam and medical decision making. I agree with the assessment and plan of care as documented in the note.  Elsa English MD   ____________________________________________    CC: Derm Problem (FBSE- spot of concern on L arm, thinks maybe a bruise)    HPI:  Mr. Jonathon Guerra is a(n) 78 year old male who presents today as a return patient for annual skin check.    Seborrheic dermatitis and  tinea pedis have both improved since last visit and are asymptomatic at this point in time. He endorses using ketoconazole shampoo every other wash. Uses triamcinolone ointment as needed.    [facility] and would [facility]ills at today's visit.    Requesting full-body skin check today. Has not noticed any new or changing lesions.    Patient is otherwise feeling well, without additional skin concerns.    Labs Reviewed:  No new labs.    Physical Exam:  Vitals: There were no vitals taken for this visit.  SKIN: Full skin, which includes the head/face, both arms, chest, back, abdomen,both legs, genitalia and/or groin buttocks, digits and/or nails, was examined.  - 2-4 mm brown macules scattered throughout chest, trunk and back, none with atypia  - Scattered red papules on chest and abdomen  - large red macule at base of posterior scalp  - violaceous linear macules on R knee  - Scattered hypopigmented macules on legs and arms  - 4 mm violaceous macule on L forearm  - No other lesions of concern on areas examined.     Medications:  Current Outpatient Medications   Medication    Acetaminophen 650 MG TABS    apixaban ANTICOAGULANT (ELIQUIS) 5 MG tablet    cholecalciferol (VITAMIN D-1000 MAX ST) 25 MCG (1000 UT) TABS    Coenzyme Q10-Vitamin E (QUNOL ULTRA COQ10 PO)    Diphenhydramine-APAP, sleep, (ACETAMINOPHEN PM PO)    FLUZONE HIGH-DOSE QUADRIVALENT 0.7 ML LAVON injection    ketoconazole (NIZORAL) 2 % external cream    ketoconazole (NIZORAL) 2 % external shampoo    levothyroxine (SYNTHROID/LEVOTHROID) 88 MCG tablet    metoprolol succinate ER (TOPROL XL) 25 MG 24 hr tablet    Multiple Vitamin (MULTI-VITAMIN PO)    rosuvastatin (CRESTOR) 40 MG tablet    saw palmetto 450 MG CAPS capsule    sotalol (BETAPACE) 80 MG tablet    triamcinolone (KENALOG) 0.1 % external cream    vitamin B-Complex    zinc gluconate 50 MG tablet    ketoconazole (NIZORAL) 2 % external cream    Melatonin 10 MG TABS  tablet     No current facility-administered medications for this visit.      Past Medical History:   Patient Active Problem List   Diagnosis    Elevated prostate specific antigen (PSA)    Hypothyroidism    Atrial fibrillation (H)    Insomnia    Gastroesophageal reflux disease without esophagitis    Hyperlipidemia    Impotence of organic origin    Urticaria    Arthropathy, lower leg    Advance Care Planning    Hyperlipidemia    Coronary artery disease    Tinea corporis    Pure hypercholesterolemia    SUKH (obstructive sleep apnea)    Benign paroxysmal positional vertigo, right    Sciatica without back pain, left    Abdominal aortic aneurysm (AAA) without rupture (H24)     Past Medical History:   Diagnosis Date    Atrial fibrillation (H)     Coronary artery disease     Hyperlipidemia     Hypertension     Thyroid disease        CC Referred Self, MD  No address on file on close of this encounter.

## 2023-10-31 NOTE — LETTER
10/31/2023       RE: Jonathon Guerra  [address]   [place] [address]     Dear Colleague,    Thank you for referring your patient, Jonathon Guerra, to the [facility] DERMATOLOGY [facility] [place] at [facility]. Please see a copy of my visit note below.    [facility] Dermatology Note  Encounter Date: Oct 31, 2023  Office Visit     Dermatology Problem List:  FBSE 10/31/2023  # Seborrheic dermatitis, scalp. Improved with use of Ketoconazole shampoo.   # Tinea pedis with onychomycosis. 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 Refills provided.     # Tinea pedis with onychomycosis- improved  - Continue to use Ketoconazole 1% cream prn. Refills provided.     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     # Benign lesions: Seborrheic keratoses, benign nevi, cherry angiomas. # Nevus simplex (stork bite).   -No treatment is necessary at this time unless these lesions change or become symptomatic.   - Sun precaution was advised including the use of sun screens of SPF 30 or higher, sun protective clothing, and avoidance of tanning beds.    Procedures Performed:     None      Follow-up: 1 year(s) in-person, or earlier for new or changing lesions        Sulema Pedroza MS3  Medical Student  I was present with the medical student who participated in the service and in the documentation of the note. I have verified the history and personally performed the physical exam and medical decision making. I agree with the assessment and  plan of care as documented in the note.  Elsa English MD   ____________________________________________    CC: Derm Problem (FBSE- spot of concern on L arm, thinks maybe a bruise)    HPI:  Mr. Jonathon Guerra is a(n) 78 year old male who presents today as a return patient for annual skin check.    Seborrheic dermatitis and tinea pedis have both improved since last visit and are asymptomatic at this point in time. He endorses using ketoconazole shampoo every other wash. Uses triamcinolone ointment as needed.    [facility] and would like refills at today's visit.    Requesting full-body skin check today. Has not noticed any new or changing lesions.    Patient is otherwise feeling well, without additional skin concerns.    Labs Reviewed:  No new labs.    Physical Exam:  Vitals: There were no vitals taken for this visit.  SKIN: Full skin, which includes the head/face, both arms, chest, back, abdomen,both legs, genitalia and/or groin buttocks, digits and/or nails, was examined.  - 2-4 mm brown macules scattered throughout chest, trunk and back, none with atypia  - Scattered red papules on chest and abdomen  - large red macule at base of posterior scalp  - violaceous linear macules on R knee  - Scattered hypopigmented macules on legs and arms  - 4 mm violaceous macule on L forearm  - No other lesions of concern on areas examined.     Medications:  Current Outpatient Medications   Medication    Acetaminophen 650 MG TABS    apixaban ANTICOAGULANT (ELIQUIS) 5 MG tablet    cholecalciferol (VITAMIN D-1000 MAX ST) 25 MCG (1000 UT) TABS    Coenzyme Q10-Vitamin E (QUNOL ULTRA COQ10 PO)    Diphenhydramine-APAP, sleep, (ACETAMINOPHEN PM PO)    FLUZONE HIGH-DOSE QUADRIVALENT 0.7 ML LAVON injection    ketoconazole (NIZORAL) 2 % external cream    ketoconazole (NIZORAL) 2 % external shampoo    levothyroxine (SYNTHROID/LEVOTHROID) 88 MCG tablet    metoprolol succinate ER (TOPROL XL) 25  MG 24 hr tablet    Multiple Vitamin (MULTI-VITAMIN PO)    rosuvastatin (CRESTOR) 40 MG tablet    saw palmetto 450 MG CAPS capsule    sotalol (BETAPACE) 80 MG tablet    triamcinolone (KENALOG) 0.1 % external cream    vitamin B-Complex    zinc gluconate 50 MG tablet    ketoconazole (NIZORAL) 2 % external cream    Melatonin 10 MG TABS tablet     No current facility-administered medications for this visit.      Past Medical History:   Patient Active Problem List   Diagnosis    Elevated prostate specific antigen (PSA)    Hypothyroidism    Atrial fibrillation (H)    Insomnia    Gastroesophageal reflux disease without esophagitis    Hyperlipidemia    Impotence of organic origin    Urticaria    Arthropathy, lower leg    Advance Care Planning    Hyperlipidemia    Coronary artery disease    Tinea corporis    Pure hypercholesterolemia    SUKH (obstructive sleep apnea)    Benign paroxysmal positional vertigo, right    Sciatica without back pain, left    Abdominal aortic aneurysm (AAA) without rupture (H24)     Past Medical History:   Diagnosis Date    Atrial fibrillation (H)     Coronary artery disease     Hyperlipidemia     Hypertension     Thyroid disease        CC Referred Self, MD  No address on file on close of this encounter.

## 2023-11-17 DIAGNOSIS — I48.20 CHRONIC ATRIAL FIBRILLATION (H): ICD-10-CM

## 2023-11-21 RX ORDER — METOPROLOL SUCCINATE 25 MG/1
12.5 TABLET, EXTENDED RELEASE ORAL DAILY
Qty: 90 TABLET | Refills: 0 | Status: SHIPPED | OUTPATIENT
Start: 2023-11-21 | End: 2023-12-20

## 2023-12-05 NOTE — PROGRESS NOTES
HPI:    Last visit with us 4/10/2023 and additional information in that note. Overall doing quite well. He continues to exercise. He states two sisters (who smoked) had/have lung cancer. He has a smoking history and had Chest CT at [facility] and had lung nodule and recommend yearly or every other year repeat. No other HEENT, cardiopulmonary, abdominal, , neurological, systemic, psychiatric, lymphatic, endocrine, vascular complaints.     Past Medical History:   Diagnosis Date    Atrial fibrillation (H)     Coronary artery disease     Hyperlipidemia     Hypertension     Thyroid disease      Past Surgical History:   Procedure Laterality Date    HERNIA REPAIR, INGUINAL RT/LT      OTHER SURGICAL HISTORY      upper and lower partial plate     PE:    Vitals noted, gen, nad, cooperative, alert, neck supple, nl rom, lungs with good air movement, RRR, S1, S2, no MRG, abdomen, no acute findings. Grossly normal neurological exam.     Resting echo 4/27/2023:  Procedure  Echocardiogram with two-dimensional, color and spectral Doppler performed.  ______________________________________________________________________________  Interpretation Summary  Left ventricular size, wall motion and function are normal. The ejection  fraction is 55-60%.     Right ventricular function, chamber size, wall motion, and thickness are  normal.     Mild (pulmonary artery systolic pressure<50mmHg) pulmonary hypertension is  present. Right ventricular systolic pressure is 33mmHg above the right atrial  pressure which is at least 33mmhg.     Mild dilatation of the aorta is present. Ascending aorta 4.1 cm.     Compared to prior baseline imaging on 10/30/2018 there are no hemodynamically  signifcant effects.  ______________________________________________________________________________  Left Ventricle  Left ventricular size, wall motion and function are normal. The ejection  fraction is 55-60%.     Right Ventricle  Right ventricular function, chamber size,  wall motion, and thickness are  normal.     Atria  The right atria appears normal. The left atrium appears normal.     Mitral Valve  The mitral valve is normal. Trace mitral insufficiency is present.     Aortic Valve  Aortic valve is normal in structure and function. Trace aortic insufficiency  is present.     Tricuspid Valve  The tricuspid valve is normal. Trace to mild tricuspid insufficiency is  present. Mild (pulmonary artery systolic pressure<50mmHg) pulmonary  hypertension is present. Right ventricular systolic pressure is 33mmHg above  the right atrial pressure.     Pulmonic Valve  The valve leaflets are not well visualized.     Vessels  Sinuses of Valsalva 3.7 cm. Mild dilatation of the aorta is present. Ascending  aorta 4.1 cm. Unable to assess mean RA pressure due to technically difficult  study.     Pericardium  No pericardial effusion is present.     Compared to Previous Study  Compared to prior baseline imaging on 10/30/2018 there are no hemodynamically  signifcant effects.    Results for orders placed or performed in visit on 12/06/23   CBC with platelets and differential     Status: Abnormal   Result Value Ref Range    WBC Count 4.5 4.0 - 11.0 10e3/uL    RBC Count 4.24 (L) 4.40 - 5.90 10e6/uL    Hemoglobin 13.7 13.3 - 17.7 g/dL    Hematocrit 37.7 (L) 40.0 - 53.0 %    MCV 89 78 - 100 fL    MCH 32.3 26.5 - 33.0 pg    MCHC 36.3 31.5 - 36.5 g/dL    RDW 12.7 10.0 - 15.0 %    Platelet Count 117 (L) 150 - 450 10e3/uL    % Neutrophils 56 %    % Lymphocytes 28 %    % Monocytes 13 %    % Eosinophils 2 %    % Basophils 0 %    % Immature Granulocytes 1 %    NRBCs per 100 WBC 0 <1 /100    Absolute Neutrophils 2.5 1.6 - 8.3 10e3/uL    Absolute Lymphocytes 1.2 0.8 - 5.3 10e3/uL    Absolute Monocytes 0.6 0.0 - 1.3 10e3/uL    Absolute Eosinophils 0.1 0.0 - 0.7 10e3/uL    Absolute Basophils 0.0 0.0 - 0.2 10e3/uL    Absolute Immature Granulocytes 0.0 <=0.4 10e3/uL    Absolute NRBCs 0.0 10e3/uL   Reticulocyte count      Status: Normal   Result Value Ref Range    % Reticulocyte 1.7 0.5 - 2.0 %    Absolute Reticulocyte 0.073 0.025 - 0.095 10e6/uL   Lab Blood Morphology Pathologist Review     Status: Abnormal (In process)    Narrative    The following orders were created for panel order Lab Blood Morphology Pathologist Review.  Procedure                               Abnormality         Status                     ---------                               -----------         ------                     Bld morphology pathology...[[id number]]                      In process                 CBC with platelets and d...[[id number]]  Abnormal            Final result               Reticulocyte count[[id number]]           Normal              Final result               Morphology Tracking[[id number]]                              Final result                 Please view results for these tests on the individual orders.         A/P:     1.  Immunizations; Moderna COVID vaccine x 5 (last 10/2/2023). Bi-valent  Pfizer COVID 10/19/2022.  Influenza vaccine 9/14/2023.   Prevnar 13. done 8/2/2017, Tdap 10/28/2014. He has completed [facility] Shinrix Zoster vaccine series.   Pneumococcal 23 done 10/28/2014. Prevnar 20 vaccine done 10/1/2022. RSV vaccine done 9/26/2023.   2. On Eliquis for afib and Metoprolol and Sotalol . Cardiology visit 10/28/2021 with Ms. Eva. He saw Ms. Daley 10/11/2022. Resting echo 4/27/2023 and he has a cardiology appt. With Dr. Shane 12/20/2023.   3. On thyroid replacement; TSH on 4/10/2023 was 1.63.   4. Increased lipids on Rosuvastatin. Lipid panel 4/10/2023; HDL 56, LDL 42 and TG's 112.   5. PSA 10/5/2022 2.87. Repeat on 4/10/2023 was 3.56.   6. Dermatology skin check 10/31/2023 with  Dr. English. Next appt. 10/30/2024 with  Dr. English.   7. Colonoscopy 7/1/2020.   8. Repeat CBC 10/5/2022 normal platelets at 155. Repeat 4/10/2023 platelets 147. Ordered repeat labs today and smear. HIV and Hep B and C. If still low will get  abdominal liver and spleen U/S and possible Hematology evaluation.   9. Ophthalmology appt. On 10/12/2022   10. Family h/o lung cancer and he has a smoking history. He gets Chest CT scan at [facility]     30 minutes spent on the date of the encounter doing chart review, history and exam, documentation and further activities as noted above

## 2023-12-06 ENCOUNTER — LAB (OUTPATIENT)
Dept: LAB | Facility: CLINIC | Age: 78
End: 2023-12-06
Payer: COMMERCIAL

## 2023-12-06 ENCOUNTER — OFFICE VISIT (OUTPATIENT)
Dept: INTERNAL MEDICINE | Facility: CLINIC | Age: 78
End: 2023-12-06
Payer: COMMERCIAL

## 2023-12-06 VITALS
SYSTOLIC BLOOD PRESSURE: 130 MMHG | DIASTOLIC BLOOD PRESSURE: 83 MMHG | OXYGEN SATURATION: 95 % | BODY MASS INDEX: 33.15 KG/M2 | HEART RATE: 66 BPM | WEIGHT: 211.7 LBS

## 2023-12-06 DIAGNOSIS — D69.6 LOW PLATELET COUNT (H): ICD-10-CM

## 2023-12-06 DIAGNOSIS — D69.6 LOW PLATELET COUNT (H): Primary | ICD-10-CM

## 2023-12-06 LAB
BASOPHILS # BLD AUTO: 0 10E3/UL (ref 0–0.2)
BASOPHILS NFR BLD AUTO: 0 %
EOSINOPHIL # BLD AUTO: 0.1 10E3/UL (ref 0–0.7)
EOSINOPHIL NFR BLD AUTO: 2 %
ERYTHROCYTE [DISTWIDTH] IN BLOOD BY AUTOMATED COUNT: 12.7 % (ref 10–15)
HBV CORE AB SERPL QL IA: NONREACTIVE
HBV SURFACE AG SERPL QL IA: NONREACTIVE
HCT VFR BLD AUTO: 37.7 % (ref 40–53)
HCV AB SERPL QL IA: NONREACTIVE
HGB BLD-MCNC: 13.7 G/DL (ref 13.3–17.7)
HIV 1+2 AB+HIV1 P24 AG SERPL QL IA: NONREACTIVE
IMM GRANULOCYTES # BLD: 0 10E3/UL
IMM GRANULOCYTES NFR BLD: 1 %
LYMPHOCYTES # BLD AUTO: 1.2 10E3/UL (ref 0.8–5.3)
LYMPHOCYTES NFR BLD AUTO: 28 %
MCH RBC QN AUTO: 32.3 PG (ref 26.5–33)
MCHC RBC AUTO-ENTMCNC: 36.3 G/DL (ref 31.5–36.5)
MCV RBC AUTO: 89 FL (ref 78–100)
MONOCYTES # BLD AUTO: 0.6 10E3/UL (ref 0–1.3)
MONOCYTES NFR BLD AUTO: 13 %
NEUTROPHILS # BLD AUTO: 2.5 10E3/UL (ref 1.6–8.3)
NEUTROPHILS NFR BLD AUTO: 56 %
NRBC # BLD AUTO: 0 10E3/UL
NRBC BLD AUTO-RTO: 0 /100
PLATELET # BLD AUTO: 117 10E3/UL (ref 150–450)
RBC # BLD AUTO: 4.24 10E6/UL (ref 4.4–5.9)
RETICS # AUTO: 0.07 10E6/UL (ref 0.03–0.1)
RETICS/RBC NFR AUTO: 1.7 % (ref 0.5–2)
WBC # BLD AUTO: 4.5 10E3/UL (ref 4–11)

## 2023-12-06 PROCEDURE — 85025 COMPLETE CBC W/AUTO DIFF WBC: CPT | Performed by: PATHOLOGY

## 2023-12-06 PROCEDURE — 86803 HEPATITIS C AB TEST: CPT | Performed by: INTERNAL MEDICINE

## 2023-12-06 PROCEDURE — 85045 AUTOMATED RETICULOCYTE COUNT: CPT | Performed by: PATHOLOGY

## 2023-12-06 PROCEDURE — 87340 HEPATITIS B SURFACE AG IA: CPT | Performed by: INTERNAL MEDICINE

## 2023-12-06 PROCEDURE — 99000 SPECIMEN HANDLING OFFICE-LAB: CPT | Performed by: PATHOLOGY

## 2023-12-06 PROCEDURE — 86704 HEP B CORE ANTIBODY TOTAL: CPT | Performed by: INTERNAL MEDICINE

## 2023-12-06 PROCEDURE — 36415 COLL VENOUS BLD VENIPUNCTURE: CPT | Performed by: PATHOLOGY

## 2023-12-06 PROCEDURE — 99214 OFFICE O/P EST MOD 30 MIN: CPT | Performed by: INTERNAL MEDICINE

## 2023-12-06 PROCEDURE — 86706 HEP B SURFACE ANTIBODY: CPT | Performed by: INTERNAL MEDICINE

## 2023-12-06 PROCEDURE — 99207 BLOOD MORPHOLOGY PATHOLOGIST REVIEW: CPT | Performed by: STUDENT IN AN ORGANIZED HEALTH CARE EDUCATION/TRAINING PROGRAM

## 2023-12-06 PROCEDURE — 87389 HIV-1 AG W/HIV-1&-2 AB AG IA: CPT | Performed by: INTERNAL MEDICINE

## 2023-12-06 NOTE — PROGRESS NOTES
Jonathon is a 78 year old that presents in clinic today for the following:     Chief Complaint   Patient presents with    Follow Up     Already had his covid and RSV vaccine.            12/6/2023     7:58 AM   Additional Questions   Roomed by YW   Accompanied by None       Screenings from encounters over the past 10 days    No data recorded       FLAKO Sargent at 7:58 AM on 12/6/2023

## 2023-12-07 LAB
HBV SURFACE AB SERPL IA-ACNC: 9.87 M[IU]/ML
HBV SURFACE AB SERPL IA-ACNC: NORMAL M[IU]/ML

## 2023-12-08 LAB
PATH REPORT.COMMENTS IMP SPEC: NORMAL
PATH REPORT.FINAL DX SPEC: NORMAL
PATH REPORT.MICROSCOPIC SPEC OTHER STN: NORMAL
PATH REPORT.MICROSCOPIC SPEC OTHER STN: NORMAL
PATH REPORT.RELEVANT HX SPEC: NORMAL

## 2023-12-19 NOTE — PROGRESS NOTES
Reason for Visit:  Today I have seen Jonathon Guerra for HLD, CAD  Consult: No     HPI : Status / Symptoms / Concerns     76 y/o m with HLD, pAF (), and nonobstructive CAD (coronary CT ). He has remained very active with above exercise capacity and no ischemia. He was seen last by Dr Aguilera in  (annual visit). He feel wells with no new concerns.  He lost 2 sisters last year and as a result has not walked much and gained 15 pounds. He has not felt any palpitations and does not think he has had any afib episodes for over 10 years.     Cardiovascular risk factor profile:   (+) HTN, (-) DM, (+) hypercholesterolemia, (+)  prior tobacco use, (-) fam Hx premature CAD.     Chest Pain:   No  Shortness of Breath:  No  Ankle Swelling:  No  Muscle Aches:  No  Palpitations:   No    Lightheadedness:  No  Fainting:   No  Medication Issues:  No  Recent Test:  Echo    Past Medical History:   Diagnosis Date    Atrial fibrillation (H)     Coronary artery disease     Hyperlipidemia     Hypertension     Thyroid disease       Past Surgical History:   Procedure Laterality Date    HERNIA REPAIR, INGUINAL RT/LT      OTHER SURGICAL HISTORY      upper and lower partial plate        Other Systems:  Resp - / GI - /MS - /Chapis - /Psy - /Derm - /Hem - / - /Lymph - /ENT -/ Endo -  No other pertinent concern in systems review.     Social History: reports that he quit smoking about 31 years ago. His smoking use included cigarettes. He started smoking about 57 years ago. He has never used smokeless tobacco. He reports that he does not drink alcohol and does not use drugs.   I have reviewed this patient's social history and updated it with pertinent information if needed.    Family History:  He indicated that the status of his no family hx of is unknown. He indicated that his mother is . He indicated that his father is . He indicated that only one of his two sisters is alive. He indicated that four of his six brothers  are alive.     Family History   Problem Relation Age of Onset    Other - See Comments Mother         old age    Cancer Father         prostate    Cancer Brother     Diabetes No family hx of     Glaucoma No family hx of     Macular Degeneration No family hx of     Hypertension No family hx of     Melanoma No family hx of     Skin Cancer No family hx of    AAA    Medications:  Current Outpatient Medications   Medication    Acetaminophen 650 MG TABS    apixaban ANTICOAGULANT (ELIQUIS) 5 MG tablet    cholecalciferol (VITAMIN D-1000 MAX ST) 25 MCG (1000 UT) TABS    Coenzyme Q10-Vitamin E (QUNOL ULTRA COQ10 PO)    Diphenhydramine-APAP, sleep, (ACETAMINOPHEN PM PO)    FLUZONE HIGH-DOSE QUADRIVALENT 0.7 ML LAVON injection    ketoconazole (NIZORAL) 2 % external cream    ketoconazole (NIZORAL) 2 % external shampoo    levothyroxine (SYNTHROID/LEVOTHROID) 88 MCG tablet    metoprolol succinate ER (TOPROL XL) 25 MG 24 hr tablet    Multiple Vitamin (MULTI-VITAMIN PO)    rosuvastatin (CRESTOR) 40 MG tablet    saw palmetto 450 MG CAPS capsule    sotalol (BETAPACE) 80 MG tablet    triamcinolone (KENALOG) 0.1 % external cream    vitamin B-Complex    zinc gluconate 50 MG tablet    ketoconazole (NIZORAL) 2 % external cream    Melatonin 10 MG TABS tablet     No current facility-administered medications for this visit.        Exam:  /87 (BP Location: Right arm, Patient Position: Chair, Cuff Size: Adult Large)   Pulse 70   Wt 95 kg (209 lb 8 oz)   SpO2 95%   BMI 32.80 kg/m     Body mass index is 32.8 kg/m .   General:  Alert, oriented, no acute distress, normal chair rise, walking not impaired   Eyes:  External exam normal, Conjunctivae noninjected and nonicteric.  Mouth:  Moist mucosa, restored teeth  Ears:  Hearing grossly intact  Neck:  No thyromegaly. Carotid upstroke normal, no carotid bruit, no JVD  Lungs:  Clear to auscultation. No wheezes, crackles, rales or rhonchi,      no accessory muscle use   Heart:  Regular 60/min,  normal S1 and S2, no obvious murmurs, no rubs or gallops  Abdomen: adipose, non-tender  Extremities: No ankle edema, age appropriate skin without stasis  Pulses: Pedal 2+ bilaterally  Chapis/Psy: Non-focal, normal mood, normal affect      Vital Trend:  Wt Readings from Last 5 Encounters:   12/20/23 95 kg (209 lb 8 oz)   12/06/23 96 kg (211 lb 11.2 oz)   04/10/23 92.8 kg (204 lb 8 oz)   10/11/22 91.4 kg (201 lb 6.4 oz)   10/11/22 91.2 kg (201 lb)     BP Readings from Last 5 Encounters:   12/20/23 134/87   12/06/23 130/83   04/10/23 123/81   10/11/22 127/85   10/11/22 114/77     Pulse Readings from Last 5 Encounters:   12/20/23 70   12/06/23 66   04/10/23 68   10/11/22 61   10/11/22 66        Data:     ECG (2023): reviewed  Sinus rhythm, borderline Bardycardia    Echocardiogram (2023):  Left ventricular size, wall motion and function are normal. The ejection  fraction is 55-60%.  Right ventricular function, chamber size, wall motion, and thickness are  normal.  Mild (pulmonary artery systolic pressure<50mmHg) pulmonary hypertension is  present. Right ventricular systolic pressure is 33mmHg above the right atrial  pressure.     Mild dilatation of the aorta is present. Ascending aorta 4.1 cm.  Compared to prior baseline imaging on 10/30/2018 there are no signifcant effects.    US aortic imaging (2019)  No abdominal aortic aneurysm demonstrated.      Lab Review:  Lab Results   Component Value Date    CR 0.96 04/10/2023    CR 0.77 04/11/2022    CR 0.96 01/12/2021    CR 0.90 10/30/2020    CR 0.77 10/21/2019    LDL 42 04/10/2023    LDL 33 04/11/2022    LDL 53 10/25/2021    HDL 56 04/10/2023    HDL 57 04/11/2022    HDL 73 10/25/2021    NTBNPI 1440 (H) 04/10/2009    POTASSIUM 4.8 04/10/2023    POTASSIUM 4.1 04/11/2022    POTASSIUM 4.0 01/12/2021     04/10/2023     04/11/2022     01/12/2021         Assessment:     Jonathon Guerra is a 78 year old male with asymptomatic CAD, HLD and pAF an a suboptimal medical  regimen with excessive sinus node suppression which increases the risk for pAF and HFpEF. Weight trended up by 15 pounds over the last year. BMI now 32.  Strongly recommended to resume his daily walks and caloric restriction to lose between 10 to 20 pounds.  We decided to discontinue metoprolol for lack of evidence basis.  I also recommended a home blood pressure upper arm device and record his blood pressures twice a month on a log.  If his blood pressure should be on average above 130 mmHg I suggest starting losartan 50 mg once a day.  I told him that next time we will reconsider the sotalol with a safer alternative such as dronedarone.    Plan:     1. CAD   - no symptoms   - Crestor 40   - STOP metoprolol    2. AF   - apixiban   - sotalol   - Caloric restriction and daily walks    3. Mildly dilated asc.Ao   - observation    Contingency Plan: Dronedarone 400 twice daily  Follow-up: 1 year    I spent 50min for the chart preparation, visit and documentation   This note was software transcribed.

## 2023-12-20 ENCOUNTER — OFFICE VISIT (OUTPATIENT)
Dept: CARDIOLOGY | Facility: CLINIC | Age: 78
End: 2023-12-20
Attending: INTERNAL MEDICINE
Payer: COMMERCIAL

## 2023-12-20 ENCOUNTER — ANCILLARY PROCEDURE (OUTPATIENT)
Dept: ULTRASOUND IMAGING | Facility: CLINIC | Age: 78
End: 2023-12-20
Attending: INTERNAL MEDICINE
Payer: COMMERCIAL

## 2023-12-20 VITALS
SYSTOLIC BLOOD PRESSURE: 134 MMHG | OXYGEN SATURATION: 95 % | WEIGHT: 209.5 LBS | DIASTOLIC BLOOD PRESSURE: 87 MMHG | HEART RATE: 70 BPM | BODY MASS INDEX: 32.8 KG/M2

## 2023-12-20 DIAGNOSIS — E78.00 HYPERCHOLESTEROLEMIA: ICD-10-CM

## 2023-12-20 DIAGNOSIS — R79.89 PLATELET COUNT LESS 140,000 PER CUBIC MILLIMETER: ICD-10-CM

## 2023-12-20 DIAGNOSIS — I25.10 CORONARY ARTERY DISEASE INVOLVING NATIVE CORONARY ARTERY OF NATIVE HEART WITHOUT ANGINA PECTORIS: Primary | ICD-10-CM

## 2023-12-20 PROCEDURE — 99214 OFFICE O/P EST MOD 30 MIN: CPT | Performed by: INTERNAL MEDICINE

## 2023-12-20 PROCEDURE — 76700 US EXAM ABDOM COMPLETE: CPT | Performed by: RADIOLOGY

## 2023-12-20 PROCEDURE — G0463 HOSPITAL OUTPT CLINIC VISIT: HCPCS | Performed by: INTERNAL MEDICINE

## 2023-12-20 RX ORDER — ROSUVASTATIN CALCIUM 40 MG/1
40 TABLET, COATED ORAL DAILY
Qty: 90 TABLET | Refills: 3 | Status: SHIPPED | OUTPATIENT
Start: 2023-12-20

## 2023-12-20 ASSESSMENT — PAIN SCALES - GENERAL: PAINLEVEL: NO PAIN (0)

## 2023-12-20 NOTE — NURSING NOTE
Chief Complaint   Patient presents with    Follow Up     12/20/23--Dr. Shane: Patient is referred by Quiana Daley PA for cardiac evaluation related to history of:     Vitals were taken and medications reconciled.    Daniel Leal, EMT  7:03 AM

## 2023-12-20 NOTE — PATIENT INSTRUCTIONS
Dr. Shane recommends:    Stop taking Metoprolol.    Follow up clinic visit with Dr. Shane in one year. No labs needed prior.    Thank you for your visit today.  Please call me with any questions or concerns.   Jacky Chicas RN  Cardiology Care Coordinator  [phone]

## 2023-12-20 NOTE — TELEPHONE ENCOUNTER
[facility] Call Center    Phone Message    May a detailed message be left on voicemail: yes     Reason for Call: Medication Refill Request    Has the patient contacted the pharmacy for the refill? Yes   Name of medication being requested:   rosuvastatin (CRESTOR) 40 MG tablet    Provider who prescribed the medication: Quiana Daley PA   Pharmacy:   [facility] [id number] [place] [address] [employer] [facility]     Date medication is needed: asap       Action Taken: Other: cardio    Travel Screening: Not Applicable    Thank you!  [facility]

## 2023-12-20 NOTE — TELEPHONE ENCOUNTER
Rosuvastatin (CRESTOR) 40 MG tablet      Last Written Prescription Date:  10/11/22  Last Fill Quantity: 90,   # refills: 3  Last Office Visit : 12/20/23 1. CAD   - no symptoms   - Crestor 40   - STOP metoprolol  Future Office visit:  None  Lipid 4/10/23    RF per protocol.          Provider who prescribed the medication: Quiana Daley PA   Pharmacy:   [place] [id number] [place] [facility] - [place], [place] - [address]      Date medication is needed: asap

## 2023-12-20 NOTE — LETTER
12/20/2023      RE: Jonathon Guerra  [address]   [facility] [address]       Dear Colleague,    Thank you for the opportunity to participate in the care of your patient, Jonathon Guerra, at the [facility] [place] at [facility]. Please see a copy of my visit note below.      Reason for Visit:  Today I have seen Jonathon Guerra for HLD, CAD  Consult: No     HPI : Status / Symptoms / Concerns     78 y/o m with HLD, pAF (2006), and nonobstructive CAD (coronary CT 2006). He has remained very active with above exercise capacity and no ischemia. He was seen last by Dr Aguilera in 2022 (annual visit). He feel wells with no new concerns.  He lost 2 sisters last year and as a result has not walked much and gained 15 pounds. He has not felt any palpitations and does not think he has had any afib episodes for over 10 years.     Cardiovascular risk factor profile:   (+) HTN, (-) DM, (+) hypercholesterolemia, (+)  prior tobacco use, (-) fam Hx premature CAD.     Chest Pain:   No  Shortness of Breath:  No  Ankle Swelling:  No  Muscle Aches:  No  Palpitations:   No    Lightheadedness:  No  Fainting:   No  Medication Issues:  No  Recent Test:  Echo    Past Medical History:   Diagnosis Date    Atrial fibrillation (H)     Coronary artery disease     Hyperlipidemia     Hypertension     Thyroid disease       Past Surgical History:   Procedure Laterality Date    HERNIA REPAIR, INGUINAL RT/LT      OTHER SURGICAL HISTORY      upper and lower partial plate        Other Systems:  Resp - / GI - /MS - /Chapis - /Psy - /Derm - /Hem - / - /Lymph - /ENT -/ Endo -  No other pertinent concern in systems review.     Social History: reports that he quit smoking about 31 years ago. His smoking use included cigarettes. He started smoking about 57 years ago. He has never used smokeless tobacco. He reports that he does not drink alcohol and does not use drugs.   I have  reviewed this patient's social history and updated it with pertinent information if needed.    Family History:  He indicated that the status of his no family hx of is unknown. He indicated that his mother is . He indicated that his father is . He indicated that only one of his two sisters is alive. He indicated that four of his six brothers are alive.     Family History   Problem Relation Age of Onset    Other - See Comments Mother         old age    Cancer Father         prostate    Cancer Brother     Diabetes No family hx of     Glaucoma No family hx of     Macular Degeneration No family hx of     Hypertension No family hx of     Melanoma No family hx of     Skin Cancer No family hx of    AAA    Medications:  Current Outpatient Medications   Medication    Acetaminophen 650 MG TABS    apixaban ANTICOAGULANT (ELIQUIS) 5 MG tablet    cholecalciferol (VITAMIN D-1000 MAX ST) 25 MCG (1000 UT) TABS    Coenzyme Q10-Vitamin E (QUNOL ULTRA COQ10 PO)    Diphenhydramine-APAP, sleep, (ACETAMINOPHEN PM PO)    FLUZONE HIGH-DOSE QUADRIVALENT 0.7 ML LAVON injection    ketoconazole (NIZORAL) 2 % external cream    ketoconazole (NIZORAL) 2 % external shampoo    levothyroxine (SYNTHROID/LEVOTHROID) 88 MCG tablet    metoprolol succinate ER (TOPROL XL) 25 MG 24 hr tablet    Multiple Vitamin (MULTI-VITAMIN PO)    rosuvastatin (CRESTOR) 40 MG tablet    saw palmetto 450 MG CAPS capsule    sotalol (BETAPACE) 80 MG tablet    triamcinolone (KENALOG) 0.1 % external cream    vitamin B-Complex    zinc gluconate 50 MG tablet    ketoconazole (NIZORAL) 2 % external cream    Melatonin 10 MG TABS tablet     No current facility-administered medications for this visit.        Exam:  /87 (BP Location: Right arm, Patient Position: Chair, Cuff Size: Adult Large)   Pulse 70   Wt 95 kg (209 lb 8 oz)   SpO2 95%   BMI 32.80 kg/m     Body mass index is 32.8 kg/m .   General:  Alert, oriented, no acute distress, normal chair rise,  walking not impaired   Eyes:  External exam normal, Conjunctivae noninjected and nonicteric.  Mouth:  Moist mucosa, restored teeth  Ears:  Hearing grossly intact  Neck:  No thyromegaly. Carotid upstroke normal, no carotid bruit, no JVD  Lungs:  Clear to auscultation. No wheezes, crackles, rales or rhonchi,      no accessory muscle use   Heart:  Regular 60/min, normal S1 and S2, no obvious murmurs, no rubs or gallops  Abdomen: adipose, non-tender  Extremities: No ankle edema, age appropriate skin without stasis  Pulses: Pedal 2+ bilaterally  Chapis/Psy: Non-focal, normal mood, normal affect      Vital Trend:  Wt Readings from Last 5 Encounters:   12/20/23 95 kg (209 lb 8 oz)   12/06/23 96 kg (211 lb 11.2 oz)   04/10/23 92.8 kg (204 lb 8 oz)   10/11/22 91.4 kg (201 lb 6.4 oz)   10/11/22 91.2 kg (201 lb)     BP Readings from Last 5 Encounters:   12/20/23 134/87   12/06/23 130/83   04/10/23 123/81   10/11/22 127/85   10/11/22 114/77     Pulse Readings from Last 5 Encounters:   12/20/23 70   12/06/23 66   04/10/23 68   10/11/22 61   10/11/22 66        Data:     ECG (2023): reviewed  Sinus rhythm, borderline Bardycardia    Echocardiogram (2023):  Left ventricular size, wall motion and function are normal. The ejection  fraction is 55-60%.  Right ventricular function, chamber size, wall motion, and thickness are  normal.  Mild (pulmonary artery systolic pressure<50mmHg) pulmonary hypertension is  present. Right ventricular systolic pressure is 33mmHg above the right atrial  pressure.     Mild dilatation of the aorta is present. Ascending aorta 4.1 cm.  Compared to prior baseline imaging on 10/30/2018 there are no signifcant effects.    US aortic imaging (2019)  No abdominal aortic aneurysm demonstrated.      Lab Review:  Lab Results   Component Value Date    CR 0.96 04/10/2023    CR 0.77 04/11/2022    CR 0.96 01/12/2021    CR 0.90 10/30/2020    CR 0.77 10/21/2019    LDL 42 04/10/2023    LDL 33 04/11/2022    LDL 53 10/25/2021     HDL 56 04/10/2023    HDL 57 04/11/2022    HDL 73 10/25/2021    NTBNPI 1440 (H) 04/10/2009    POTASSIUM 4.8 04/10/2023    POTASSIUM 4.1 04/11/2022    POTASSIUM 4.0 01/12/2021     04/10/2023     04/11/2022     01/12/2021         Assessment:     Jonathon Guerra is a 78 year old male with asymptomatic CAD, HLD and pAF an a suboptimal medical regimen with excessive sinus node suppression which increases the risk for pAF and HFpEF. Weight trended up by 15 pounds over the last year. BMI now 32.  Strongly recommended to resume his daily walks and caloric restriction to lose between 10 to 20 pounds.  We decided to discontinue metoprolol for lack of evidence basis.  I also recommended a home blood pressure upper arm device and record his blood pressures twice a month on a log.  If his blood pressure should be on average above 130 mmHg I suggest starting losartan 50 mg once a day.  I told him that next time we will reconsider the sotalol with a safer alternative such as dronedarone.    Plan:     1. CAD   - no symptoms   - Crestor 40   - STOP metoprolol    2. AF   - apixiban   - sotalol   - Caloric restriction and daily walks    3. Mildly dilated asc.Ao   - observation    Contingency Plan: Dronedarone 400 twice daily  Follow-up: 1 year    I spent 50min for the chart preparation, visit and documentation   This note was software transcribed.        Please do not hesitate to contact me if you have any questions/concerns.     Sincerely,     Cheng Shane MD

## 2024-03-15 ENCOUNTER — MYC REFILL (OUTPATIENT)
Dept: CARDIOLOGY | Facility: CLINIC | Age: 79
End: 2024-03-15

## 2024-03-15 DIAGNOSIS — E78.00 HYPERCHOLESTEROLEMIA: ICD-10-CM

## 2024-03-15 RX ORDER — ROSUVASTATIN CALCIUM 40 MG/1
40 TABLET, COATED ORAL DAILY
Qty: 90 TABLET | Refills: 3 | Status: CANCELLED | OUTPATIENT
Start: 2024-03-15

## 2024-04-10 ENCOUNTER — E-VISIT (OUTPATIENT)
Dept: URGENT CARE | Facility: CLINIC | Age: 79
End: 2024-04-10
Payer: COMMERCIAL

## 2024-04-10 DIAGNOSIS — J01.90 ACUTE NON-RECURRENT SINUSITIS, UNSPECIFIED LOCATION: Primary | ICD-10-CM

## 2024-04-10 PROCEDURE — 99421 OL DIG E/M SVC 5-10 MIN: CPT | Performed by: NURSE PRACTITIONER

## 2024-04-10 NOTE — PATIENT INSTRUCTIONS
Acute Sinusitis: Care Instructions  Overview     Acute sinusitis is an inflammation of the mucous membranes inside the nose and sinuses. Sinuses are the hollow spaces in your skull around the eyes and nose. Acute sinusitis often follows a cold. Acute sinusitis causes thick, discolored mucus that drains from the nose or down the back of the throat. It also can cause pain and pressure in your head and face along with a stuffy or blocked nose.  In most cases, sinusitis gets better on its own in 1 to 2 weeks. But some mild symptoms may last for several weeks. Sometimes antibiotics are needed if there is a bacterial infection.  Follow-up care is a key part of your treatment and safety. Be sure to make and go to all appointments, and call your doctor if you are having problems. It's also a good idea to know your test results and keep a list of the medicines you take.  How can you care for yourself at home?  Use saline (saltwater) nasal washes. This can help keep your nasal passages open and wash out mucus and allergens.  You can buy saline nose washes at a grocery store or drugstore. Follow the instructions on the package.  You can make your own at home. Add 1 teaspoon of non-iodized salt and 1 teaspoon of baking soda to 2 cups of distilled or boiled and cooled water. Fill a squeeze bottle or a nasal cleansing pot (such as a neti pot) with the nasal wash. Then put the tip into your nostril, and lean over the sink. With your mouth open, gently squirt the liquid. Repeat on the other side.  Try a decongestant nasal spray like oxymetazoline (Afrin). Do not use it for more than 3 days in a row. Using it for more than 3 days can make your congestion worse.  If needed, take an over-the-counter pain medicine, such as acetaminophen (Tylenol), ibuprofen (Advil, Motrin), or naproxen (Aleve). Read and follow all instructions on the label.  If the doctor prescribed antibiotics, take them as directed. Do not stop taking them just  "because you feel better. You need to take the full course of antibiotics.  Be careful when taking over-the-counter cold or flu medicines and Tylenol at the same time. Many of these medicines have acetaminophen, which is Tylenol. Read the labels to make sure that you are not taking more than the recommended dose. Too much acetaminophen (Tylenol) can be harmful.  Try a steroid nasal spray. It may help with your symptoms.  Breathe warm, moist air. You can use a steamy shower, a hot bath, or a sink filled with hot water. Avoid cold, dry air. Using a humidifier in your home may help. Follow the directions for cleaning the machine.  When should you call for help?   Call your doctor now or seek immediate medical care if:    You have new or worse swelling, redness, or pain in your face or around one or both of your eyes.     You have double vision or a change in your vision.     You have a high fever.     You have a severe headache and a stiff neck.     You have mental changes, such as feeling confused or much less alert.   Watch closely for changes in your health, and be sure to contact your doctor if:    You are not getting better as expected.   Where can you learn more?  Go to https://www.[employer].net/patiented  Enter I933 in the search box to learn more about \"Acute Sinusitis: Care Instructions.\"  Current as of: September 27, 2023               Content Version: 14.0    0980-0244 .com.   Care instructions adapted under license by your healthcare professional. If you have questions about a medical condition or this instruction, always ask your healthcare professional. .com disclaims any warranty or liability for your use of this information.      Dear Jonathon Guerra    After reviewing your responses, I've been able to diagnose you with sinusitis.      Based on your responses and diagnosis, I have prescribed augmentin to treat your symptoms. I have sent this to your pharmacy.? "     It is also important to stay well hydrated, get lots of rest and take over-the-counter decongestants,?tylenol?or ibuprofen if you?are able to?take those medications per your primary care provider to help relieve discomfort.?     It is important that you take?all of?your prescribed medication even if your symptoms are improving after a few doses.? Taking?all of?your medicine helps prevent the symptoms from returning.?     If your symptoms worsen, you develop severe headache, vomiting, high fever (>102), or are not improving in 7 days, please contact your primary care provider for an appointment or visit any of our convenient Walk-in Care or Urgent Care Centers to be seen which can be found on our website?here.?     Thanks again for choosing?us?as your health care partner,?   ?  Paige Valdez, SUDHEER?

## 2024-05-31 ENCOUNTER — LAB (OUTPATIENT)
Dept: LAB | Facility: CLINIC | Age: 79
End: 2024-05-31
Payer: COMMERCIAL

## 2024-05-31 DIAGNOSIS — R79.89 PLATELET COUNT LESS 140,000 PER CUBIC MILLIMETER: ICD-10-CM

## 2024-05-31 LAB
BASOPHILS # BLD AUTO: 0 10E3/UL (ref 0–0.2)
BASOPHILS NFR BLD AUTO: 0 %
EOSINOPHIL # BLD AUTO: 0 10E3/UL (ref 0–0.7)
EOSINOPHIL NFR BLD AUTO: 1 %
ERYTHROCYTE [DISTWIDTH] IN BLOOD BY AUTOMATED COUNT: 13.1 % (ref 10–15)
HCT VFR BLD AUTO: 41.6 % (ref 40–53)
HGB BLD-MCNC: 14.2 G/DL (ref 13.3–17.7)
IMM GRANULOCYTES # BLD: 0 10E3/UL
IMM GRANULOCYTES NFR BLD: 1 %
LYMPHOCYTES # BLD AUTO: 1.7 10E3/UL (ref 0.8–5.3)
LYMPHOCYTES NFR BLD AUTO: 32 %
MCH RBC QN AUTO: 30.6 PG (ref 26.5–33)
MCHC RBC AUTO-ENTMCNC: 34.1 G/DL (ref 31.5–36.5)
MCV RBC AUTO: 90 FL (ref 78–100)
MONOCYTES # BLD AUTO: 0.8 10E3/UL (ref 0–1.3)
MONOCYTES NFR BLD AUTO: 14 %
NEUTROPHILS # BLD AUTO: 2.8 10E3/UL (ref 1.6–8.3)
NEUTROPHILS NFR BLD AUTO: 52 %
NRBC # BLD AUTO: 0 10E3/UL
NRBC BLD AUTO-RTO: 0 /100
PLATELET # BLD AUTO: 148 10E3/UL (ref 150–450)
RBC # BLD AUTO: 4.64 10E6/UL (ref 4.4–5.9)
WBC # BLD AUTO: 5.4 10E3/UL (ref 4–11)

## 2024-05-31 PROCEDURE — 36415 COLL VENOUS BLD VENIPUNCTURE: CPT | Performed by: PATHOLOGY

## 2024-05-31 PROCEDURE — 85025 COMPLETE CBC W/AUTO DIFF WBC: CPT | Performed by: PATHOLOGY

## 2024-06-02 ENCOUNTER — HEALTH MAINTENANCE LETTER (OUTPATIENT)
Age: 79
End: 2024-06-02

## 2024-06-06 NOTE — PROGRESS NOTES
HPI:    He states several months of difficulty with walking. He states he finds it difficult to stop after walking and bends over. No weakness, He tries to walk several miles a day. No numbness, No other neurological complaints. Otherwise, he feels fine. No other HEENT, cardiopulmonary, abdominal, , neurological, systemic, psychiatric, lymphatic, endocrine, vascular complaints.   Past Medical History:   Diagnosis Date    Atrial fibrillation (H)     Coronary artery disease     Hyperlipidemia     Hypertension     Thyroid disease      Past Surgical History:   Procedure Laterality Date    HERNIA REPAIR, INGUINAL RT/LT      OTHER SURGICAL HISTORY      upper and lower partial plate     PE:    Vitals noted, gen, nad, cooperative, alert, neck supple nl rom, lungs with good air movement, RRR, S1, S2, no MRG, abdomen, no acute findings. He has adequate B LE strength     Recent Results (from the past 720 hour(s))   CBC with platelets and differential    Collection Time: 05/31/24  7:29 AM   Result Value Ref Range    WBC Count 5.4 4.0 - 11.0 10e3/uL    RBC Count 4.64 4.40 - 5.90 10e6/uL    Hemoglobin 14.2 13.3 - 17.7 g/dL    Hematocrit 41.6 40.0 - 53.0 %    MCV 90 78 - 100 fL    MCH 30.6 26.5 - 33.0 pg    MCHC 34.1 31.5 - 36.5 g/dL    RDW 13.1 10.0 - 15.0 %    Platelet Count 148 (L) 150 - 450 10e3/uL    % Neutrophils 52 %    % Lymphocytes 32 %    % Monocytes 14 %    % Eosinophils 1 %    % Basophils 0 %    % Immature Granulocytes 1 %    NRBCs per 100 WBC 0 <1 /100    Absolute Neutrophils 2.8 1.6 - 8.3 10e3/uL    Absolute Lymphocytes 1.7 0.8 - 5.3 10e3/uL    Absolute Monocytes 0.8 0.0 - 1.3 10e3/uL    Absolute Eosinophils 0.0 0.0 - 0.7 10e3/uL    Absolute Basophils 0.0 0.0 - 0.2 10e3/uL    Absolute Immature Granulocytes 0.0 <=0.4 10e3/uL    Absolute NRBCs 0.0 10e3/uL         A/P:     1.  Immunizations; Moderna COVID vaccine x 5 (last 10/2/2023). Bi-valent  Pfizer COVID 10/19/2022.     Prevnar 13. done 8/2/2017, Tdap 10/28/2014.  He has completed [facility] Shinrix Zoster vaccine series.   Pneumococcal 23 done 10/28/2014. Prevnar 20 vaccine done 10/1/2022. RSV vaccine done 9/26/2023.   2. On Eliquis for afib and Metoprolol and Sotalol . Cardiology visit 10/28/2021 with Ms. Velasquez. He saw Ms. Daley 10/11/2022. Resting echo 4/27/2023 and he had a cardiology appt. With Dr. Shane 12/20/2023.   3. On thyroid replacement; TSH on 4/10/2023 was 1.63.   4. Increased lipids on Rosuvastatin. Lipid panel 4/10/2023; HDL 56, LDL 42 and TG's 112.   5. PSA 10/5/2022 2.87. Repeat on 4/10/2023 was 3.56.   6. Dermatology skin check 10/31/2023 with  Dr. English. Next appt. 10/30/2024 with  Dr. English.   7. Colonoscopy 7/1/2020.   8. Repeat CBC 10/5/2022 normal platelets at 155. Repeat 4/10/2023 platelets 147.  If still low will get abdominal liver and spleen U/S and possible Hematology evaluation.   9. Ophthalmology appt. On 10/12/2022. He had a normal abdominal ultrasound 12/20/2023  10. Family h/o lung cancer and he has a smoking history. He gets Chest CT scan [facility]  11. Walking complaint. MRI lumbar and brain and neurology referral      30 minutes spent on the date of the encounter doing chart review, history and exam, documentation and further activities as noted above

## 2024-06-07 ENCOUNTER — LAB (OUTPATIENT)
Dept: LAB | Facility: CLINIC | Age: 79
End: 2024-06-07
Payer: COMMERCIAL

## 2024-06-07 ENCOUNTER — OFFICE VISIT (OUTPATIENT)
Dept: INTERNAL MEDICINE | Facility: CLINIC | Age: 79
End: 2024-06-07
Payer: COMMERCIAL

## 2024-06-07 ENCOUNTER — TELEPHONE (OUTPATIENT)
Dept: INTERNAL MEDICINE | Facility: CLINIC | Age: 79
End: 2024-06-07

## 2024-06-07 VITALS
DIASTOLIC BLOOD PRESSURE: 83 MMHG | OXYGEN SATURATION: 97 % | SYSTOLIC BLOOD PRESSURE: 126 MMHG | WEIGHT: 214.6 LBS | HEART RATE: 73 BPM | BODY MASS INDEX: 33.6 KG/M2

## 2024-06-07 DIAGNOSIS — I10 BENIGN ESSENTIAL HYPERTENSION: ICD-10-CM

## 2024-06-07 DIAGNOSIS — E03.9 ACQUIRED HYPOTHYROIDISM: ICD-10-CM

## 2024-06-07 DIAGNOSIS — Z12.5 SCREENING FOR PROSTATE CANCER: ICD-10-CM

## 2024-06-07 DIAGNOSIS — R26.2 DIFFICULTY WALKING: ICD-10-CM

## 2024-06-07 DIAGNOSIS — I10 BENIGN ESSENTIAL HYPERTENSION: Primary | ICD-10-CM

## 2024-06-07 DIAGNOSIS — R94.6 THYROID FUNCTION TEST ABNORMAL: ICD-10-CM

## 2024-06-07 LAB
ALBUMIN SERPL BCG-MCNC: 4.5 G/DL (ref 3.5–5.2)
ALP SERPL-CCNC: 35 U/L (ref 40–150)
ALT SERPL W P-5'-P-CCNC: 11 U/L (ref 0–70)
ANION GAP SERPL CALCULATED.3IONS-SCNC: 10 MMOL/L (ref 7–15)
AST SERPL W P-5'-P-CCNC: 23 U/L (ref 0–45)
BASOPHILS # BLD AUTO: 0 10E3/UL (ref 0–0.2)
BASOPHILS NFR BLD AUTO: 0 %
BILIRUB SERPL-MCNC: 0.5 MG/DL
BUN SERPL-MCNC: 18.6 MG/DL (ref 8–23)
CALCIUM SERPL-MCNC: 9.5 MG/DL (ref 8.8–10.2)
CHLORIDE SERPL-SCNC: 104 MMOL/L (ref 98–107)
CHOLEST SERPL-MCNC: 121 MG/DL
CREAT SERPL-MCNC: 1 MG/DL (ref 0.67–1.17)
DEPRECATED HCO3 PLAS-SCNC: 26 MMOL/L (ref 22–29)
EGFRCR SERPLBLD CKD-EPI 2021: 78 ML/MIN/1.73M2
EOSINOPHIL # BLD AUTO: 0.1 10E3/UL (ref 0–0.7)
EOSINOPHIL NFR BLD AUTO: 1 %
ERYTHROCYTE [DISTWIDTH] IN BLOOD BY AUTOMATED COUNT: 13.1 % (ref 10–15)
FASTING STATUS PATIENT QL REPORTED: ABNORMAL
FASTING STATUS PATIENT QL REPORTED: NORMAL
GLUCOSE SERPL-MCNC: 102 MG/DL (ref 70–99)
HCT VFR BLD AUTO: 42.3 % (ref 40–53)
HDLC SERPL-MCNC: 48 MG/DL
HGB BLD-MCNC: 14.4 G/DL (ref 13.3–17.7)
IMM GRANULOCYTES # BLD: 0 10E3/UL
IMM GRANULOCYTES NFR BLD: 0 %
LDLC SERPL CALC-MCNC: 49 MG/DL
LYMPHOCYTES # BLD AUTO: 1.1 10E3/UL (ref 0.8–5.3)
LYMPHOCYTES NFR BLD AUTO: 23 %
MCH RBC QN AUTO: 30.8 PG (ref 26.5–33)
MCHC RBC AUTO-ENTMCNC: 34 G/DL (ref 31.5–36.5)
MCV RBC AUTO: 91 FL (ref 78–100)
MONOCYTES # BLD AUTO: 0.6 10E3/UL (ref 0–1.3)
MONOCYTES NFR BLD AUTO: 12 %
NEUTROPHILS # BLD AUTO: 3 10E3/UL (ref 1.6–8.3)
NEUTROPHILS NFR BLD AUTO: 64 %
NONHDLC SERPL-MCNC: 73 MG/DL
NRBC # BLD AUTO: 0 10E3/UL
NRBC BLD AUTO-RTO: 0 /100
PLATELET # BLD AUTO: 151 10E3/UL (ref 150–450)
POTASSIUM SERPL-SCNC: 4.5 MMOL/L (ref 3.4–5.3)
PROT SERPL-MCNC: 7.4 G/DL (ref 6.4–8.3)
PSA SERPL DL<=0.01 NG/ML-MCNC: 3.84 NG/ML (ref 0–6.5)
RBC # BLD AUTO: 4.67 10E6/UL (ref 4.4–5.9)
SODIUM SERPL-SCNC: 140 MMOL/L (ref 135–145)
TRIGL SERPL-MCNC: 118 MG/DL
TSH SERPL DL<=0.005 MIU/L-ACNC: 2.33 UIU/ML (ref 0.3–4.2)
WBC # BLD AUTO: 4.8 10E3/UL (ref 4–11)

## 2024-06-07 PROCEDURE — 36415 COLL VENOUS BLD VENIPUNCTURE: CPT | Performed by: PATHOLOGY

## 2024-06-07 PROCEDURE — 80053 COMPREHEN METABOLIC PANEL: CPT | Performed by: PATHOLOGY

## 2024-06-07 PROCEDURE — 84443 ASSAY THYROID STIM HORMONE: CPT | Performed by: PATHOLOGY

## 2024-06-07 PROCEDURE — 80061 LIPID PANEL: CPT | Performed by: PATHOLOGY

## 2024-06-07 PROCEDURE — G0103 PSA SCREENING: HCPCS | Performed by: PATHOLOGY

## 2024-06-07 PROCEDURE — 99214 OFFICE O/P EST MOD 30 MIN: CPT | Performed by: INTERNAL MEDICINE

## 2024-06-07 PROCEDURE — 85025 COMPLETE CBC W/AUTO DIFF WBC: CPT | Performed by: PATHOLOGY

## 2024-06-07 RX ORDER — LEVOTHYROXINE SODIUM 88 UG/1
88 TABLET ORAL DAILY
Qty: 90 TABLET | Refills: 2 | Status: SHIPPED | OUTPATIENT
Start: 2024-06-07

## 2024-06-07 NOTE — PROGRESS NOTES
Answers submitted by the patient for this visit:  General Questionnaire (Submitted on 6/2/2024)  Chief Complaint: Chronic problems general questions HPI Form  What is the reason for your visit today? : scheduled return visit  How many servings of fruits and vegetables do you eat daily?: 2-3  On average, how many sweetened beverages do you drink each day (Examples: soda, juice, sweet tea, etc.  Do NOT count diet or artificially sweetened beverages)?: 1  How many minutes a day do you exercise enough to make your heart beat faster?: 30 to 60  How many days a week do you exercise enough to make your heart beat faster?: 5  How many days per week do you miss taking your medication?: 0  Jonathon is a 78 year old that presents in clinic today for the following:     Chief Complaint   Patient presents with    Follow Up     6 months follow up            6/7/2024     7:53 AM   Additional Questions   Roomed by MR     Screenings as of today     PHQ-2 Total Score (Adult) - Positive if 3 or more points; Administer   PHQ-9 if positive 0        Ellie Henry EMT at 7:58 AM on 6/7/2024

## 2024-07-10 ENCOUNTER — ANCILLARY PROCEDURE (OUTPATIENT)
Dept: MRI IMAGING | Facility: CLINIC | Age: 79
End: 2024-07-10
Attending: INTERNAL MEDICINE
Payer: COMMERCIAL

## 2024-07-10 DIAGNOSIS — I10 BENIGN ESSENTIAL HYPERTENSION: ICD-10-CM

## 2024-07-10 DIAGNOSIS — Z12.5 SCREENING FOR PROSTATE CANCER: ICD-10-CM

## 2024-07-10 DIAGNOSIS — R94.6 THYROID FUNCTION TEST ABNORMAL: ICD-10-CM

## 2024-07-10 DIAGNOSIS — E03.9 ACQUIRED HYPOTHYROIDISM: ICD-10-CM

## 2024-07-10 DIAGNOSIS — R26.2 DIFFICULTY WALKING: ICD-10-CM

## 2024-07-10 PROCEDURE — 72148 MRI LUMBAR SPINE W/O DYE: CPT | Mod: GC | Performed by: RADIOLOGY

## 2024-07-10 PROCEDURE — 70553 MRI BRAIN STEM W/O & W/DYE: CPT | Mod: GC | Performed by: RADIOLOGY

## 2024-07-10 PROCEDURE — A9585 GADOBUTROL INJECTION: HCPCS | Mod: JZ | Performed by: RADIOLOGY

## 2024-07-10 RX ORDER — GADOBUTROL 604.72 MG/ML
10 INJECTION INTRAVENOUS ONCE
Status: COMPLETED | OUTPATIENT
Start: 2024-07-10 | End: 2024-07-10

## 2024-07-10 RX ADMIN — GADOBUTROL 10 ML: 604.72 INJECTION INTRAVENOUS at 18:53

## 2024-07-10 NOTE — DISCHARGE INSTRUCTIONS
MRI Contrast Discharge Instructions    The IV contrast you received today will pass out of your body in your  urine. This will happen in the next 24 hours. You will not feel this process.  Your urine will not change color.    Drink at least 4 extra glasses of water or juice today (unless your doctor  has restricted your fluids). This reduces the stress on your kidneys.  You may take your regular medicines.    If you are on dialysis: It is best to have dialysis today.    If you have a reaction: Most reactions happen right away. If you have  any new symptoms after leaving the hospital (such as hives or swelling),  call your hospital at the correct number below. Or call your family doctor.  If you have breathing distress or wheezing, call 911.    Special instructions: ***    I have read and understand the above information.    Signature:______________________________________ Date:___________    Staff:__________________________________________ Date:___________     Time:__________    [facility] Departments:    ___[place]: [phone]  ___[facility]: [phone]  ___[place]: [phone] ___[facility]: [phone]  ___[facility]: [phone]  ___[facility]: [phone]  ___[phone]  ___[facility]: [phone]  ___[phone]

## 2024-07-17 DIAGNOSIS — M54.50 LUMBAR PAIN: Primary | ICD-10-CM

## 2024-07-19 NOTE — TELEPHONE ENCOUNTER
DIAGNOSIS: BACK PAIN   APPOINTMENT DATE: 07/22/2024   NOTES STATUS DETAILS   OFFICE NOTE from referring provider Internal Rafy Walker MD  Internal Medicine   OFFICE NOTE from other specialist Internal [facility] Cardiology   MRI PACS Internal:  07/10/2024 - Lumbar Spine      Dr Luna ER attending

## 2024-07-22 ENCOUNTER — ANCILLARY PROCEDURE (OUTPATIENT)
Dept: GENERAL RADIOLOGY | Facility: CLINIC | Age: 79
End: 2024-07-22
Attending: ORTHOPAEDIC SURGERY
Payer: COMMERCIAL

## 2024-07-22 ENCOUNTER — OFFICE VISIT (OUTPATIENT)
Dept: ORTHOPEDICS | Facility: CLINIC | Age: 79
End: 2024-07-22
Attending: INTERNAL MEDICINE
Payer: COMMERCIAL

## 2024-07-22 ENCOUNTER — PRE VISIT (OUTPATIENT)
Dept: ORTHOPEDICS | Facility: CLINIC | Age: 79
End: 2024-07-22

## 2024-07-22 VITALS — BODY MASS INDEX: 32.29 KG/M2 | HEIGHT: 69 IN | WEIGHT: 218 LBS

## 2024-07-22 DIAGNOSIS — M54.9 BACK PAIN, UNSPECIFIED BACK LOCATION, UNSPECIFIED BACK PAIN LATERALITY, UNSPECIFIED CHRONICITY: ICD-10-CM

## 2024-07-22 DIAGNOSIS — M54.50 LUMBAR PAIN: ICD-10-CM

## 2024-07-22 PROCEDURE — 72110 X-RAY EXAM L-2 SPINE 4/>VWS: CPT | Performed by: STUDENT IN AN ORGANIZED HEALTH CARE EDUCATION/TRAINING PROGRAM

## 2024-07-22 PROCEDURE — 99204 OFFICE O/P NEW MOD 45 MIN: CPT | Performed by: PHYSICIAN ASSISTANT

## 2024-07-22 NOTE — NURSING NOTE
"Reason For Visit:   Chief Complaint   Patient presents with    Consult     New patient consult for lumbar spine.       Primary MD: Rafy Walker  Ref. MD: Rafy Walker MD    ?  No  Occupation Retired.  Currently working? No.  Work status?  Retired.  Date of injury: NA  Type of injury: Chronic.  Date of surgery: NA  Type of surgery: NA.  Smoker: No  Request smoking cessation information: No    Ht 1.74 m (5' 8.5\")   Wt 98.9 kg (218 lb)   BMI 32.66 kg/m      Pain Assessment  Patient Currently in Pain: No    Oswestry (CARRILLO) Questionnaire        7/17/2024    10:01 AM   OSWESTRY DISABILITY INDEX   Count 9   Sum 1   Oswestry Score (%) 2.22 %            Neck Disability Index (NDI) Questionnaire         No data to display                       Visual Analog Pain Scale  Back Pain Scale 0-10: 0  Right leg pain: 0  Left leg pain: 0  Neck Pain Scale 0-10: 0  Right arm pain: 0  Left arm pain: 0    Promis 10 Assessment         No data to display                         Mesfin Schmitt ATC    "

## 2024-07-22 NOTE — LETTER
7/22/2024      Jonathon Guerra  [address]   [facility] [address]      Dear Colleague,    Thank you for referring your patient, Jonathon Guerra, to the [facility] [place]. Please see a copy of my visit note below.    Spine Surgery Consultation    REFERRING PHYSICIAN: Rafy Walker   PRIMARY CARE PHYSICIAN: Rafy Walker           Chief Complaint:   Consult (New patient consult for lumbar spine.)    History of Present Illness:     Jonathon Guerra is a 78 year old male who presents today for evaluation of lumbar spine    His complaint is instability with walking that started last Thanksgiving.  Denied any traumatic falls.  Notes no pain or weakness with walking.  He feels like he is leaning forward and has a hard time slowing down his gait.  Feels like things have not changed since onset. Denies any bowel/bladder changes. Notes back pain 3 years ago resolved with chiropractic services. No injections.notes can no longer get adjustments due to stenosis found on MRI. Concerned about the instability and is why he was referred to see us.    Past treatments tried:  - Physical therapy: no recent PT, but has been engaging his glutes and does bridges before going on a walk  - Injections: no recent injections  - Medications: daily 600mg of ibuprofen, 2 ibuprofen pm. General arthritic pain.     Oswestry (CARRILLO) Questionnaire        7/17/2024    10:01 AM   OSWESTRY DISABILITY INDEX   Count 9   Sum 1   Oswestry Score (%) 2.22 %            Past Medical History:     Past Medical History:   Diagnosis Date     Atrial fibrillation (H)      Coronary artery disease      Hyperlipidemia      Hypertension      Thyroid disease             Past Surgical History:     Past Surgical History:   Procedure Laterality Date     HERNIA REPAIR, INGUINAL RT/LT       OTHER SURGICAL HISTORY      upper and lower partial plate            Social History:     Social History     Tobacco Use     Smoking  status: Former     Current packs/day: 0.00     Types: Cigarettes     Start date: 1966     Quit date: 1992     Years since quittin.6     Smokeless tobacco: Never   Substance Use Topics     Alcohol use: No            Family History:     Family History   Problem Relation Age of Onset     Other - See Comments Mother         old age     Cancer Father         prostate     Cancer Brother      Diabetes No family hx of      Glaucoma No family hx of      Macular Degeneration No family hx of      Hypertension No family hx of      Melanoma No family hx of      Skin Cancer No family hx of             Allergies:     Allergies   Allergen Reactions     Atorvastatin      Lipitor--muscle aches     Short Ragweed Pollen Ext Cough            Medications:     Current Outpatient Medications   Medication Sig Dispense Refill     Acetaminophen 650 MG TABS Take by mouth daily       apixaban ANTICOAGULANT (ELIQUIS) 5 MG tablet Take 1 tablet (5 mg) by mouth 2 times daily 180 tablet 3     cholecalciferol (VITAMIN D-1000 MAX ST) 25 MCG (1000 UT) TABS Take 2 tablets by mouth daily 180 tablet 3     Coenzyme Q10-Vitamin E (QUNOL ULTRA COQ10 PO)        Diphenhydramine-APAP, sleep, (ACETAMINOPHEN PM PO) Take 500 mg by mouth       FLUZONE HIGH-DOSE QUADRIVALENT 0.7 ML LAVON injection Pt had flu shot        ketoconazole (NIZORAL) 2 % external cream Apply topically daily As needed to affected areas of rash or scale 60 g 11     ketoconazole (NIZORAL) 2 % external cream Apply to the feet and inbetween the toes daily x 1 month then as needed. 60 g 3     ketoconazole (NIZORAL) 2 % external shampoo Apply topically daily as needed for itching or irritation 120 mL 11     levothyroxine (SYNTHROID/LEVOTHROID) 88 MCG tablet Take 1 tablet (88 mcg) by mouth daily 90 tablet 2     Melatonin 10 MG TABS tablet Take 10 mg by mouth       Multiple Vitamin (MULTI-VITAMIN PO) Take by mouth daily        rosuvastatin (CRESTOR) 40 MG tablet Take 1 tablet (40  "mg) by mouth daily 90 tablet 3     saw palmetto 450 MG CAPS capsule        sotalol (BETAPACE) 80 MG tablet Take 1 tablet (80 mg) by mouth 2 times daily No further refills by provider. Needs to establish care with a general cardiologist 120 tablet 0     triamcinolone (KENALOG) 0.1 % external cream APPLY TO AFFECTED AREA on scalp and lower legs as needed twice daily until rashes clear 45 g 1     vitamin B-Complex Take 1 tablet by mouth daily       zinc gluconate 50 MG tablet Take 1 tablet (50 mg) by mouth daily       No current facility-administered medications for this visit.             Review of Systems:     A 10 point ROS was performed and reviewed.  See HPI and Epic intake form for pertinent positive.          Physical Exam:   Vitals: Ht 1.74 m (5' 8.5\")   Wt 98.9 kg (218 lb)   BMI 32.66 kg/m    Constitutional: Patient is healthy, well-nourished and appears stated age.  Respiratory: Patient is breathing normally and in no respiratory distress.  Skin: No suspicious rashes or lesions  Gait: Non-antalgic gait without use of assistive devices. Able to tandem gait without difficulty.   Neurologic - Sensation intact to light touch bilaterally. Deep tendon reflexes 2+ patella.   Musculoskeletal: Strength: 5/5 bilateral HF KE DF PF  Lumbosacral Spine:  Appearance - Normal lordosis  Palpation - Non-tender to palpation, facets non-tender. SI joints non-tender.   ROM - Full, no pain    Hip: No pain with hip log roll and no tenderness over the greater trochanters. Lor negative.          Imaging:   We independently reviewed and interpreted the following imaging at this clinic visit which were also reviewed with patient    XR lumbar 4 views  Mild right lumbar degenerative scoliosis. No PI to LL mismatch. L4-5 lateral listhesis    MR lumbar 7/10/2024  Severe L4-5 stenosis Modic endplate changes,      Assessment:     78 year old male with     Right mild degenerative lumbar scoliosis  L4-5 severe lumbar stenosis with " neurogenic claudication     He presents with a degenerative scoliosis and severe lumbar stenosis at L4-5.  He has instability and complaints of leaning forward with walking for long periods of time.  He denies any pain or limitations in his back.  He denies any bowel or bladder changes.  He denies any paresthesia.  Plan to continue observation.  Will plan on sending him for some physical therapy.  If he develops any debilitating pain complaints in the later future plan will be for bilateral L4-5 TFESI.  Educated that he can get this injection without a clinic visit.     Plan:     Follow up prn  PT  L4-5 bilateral TFESI if pain develops. Does not need a clinic appointment.     Plan formulated with Dr. Bartlett who saw patient and examined the patient and reviewed imaging. We used the patient's imaging and models to explain their pathophysiology and treatment options. All the patient's questions were answered to their full satisfaction.     Thank you for allowing me to be a part of this patient's care.    Bishop LASHA Grace PA-C   Orthopedic Spine Surgery    Attending MD (Dr. Johnnie Bartlett) : I personally performed greater than 50% of the effort related to this patient visit and am responsible for the medical decision making and billing in this case.  I met with the patient, reviewed and verified the history and physical exam of the patient and discussed the patient's management with the other clinical providers involved in this patient's care including any involved residents or physicians assistants. I also personally reviewed the imaging and I personally formulated the treatment plan and diagnosis in their entirety.  I reviewed the above note and agree with the documented findings and plan of care, which were communicated to the patient.      Johnnie Bartlett MD        Again, thank you for allowing me to participate in the care of your patient.        Sincerely,        Johnnie Bartlett MD

## 2024-07-22 NOTE — PROGRESS NOTES
Spine Surgery Consultation    REFERRING PHYSICIAN: Rafy Walker   PRIMARY CARE PHYSICIAN: Rafy Walker           Chief Complaint:   Consult (New patient consult for lumbar spine.)    History of Present Illness:     Jonathon Guerra is a 78 year old male who presents today for evaluation of lumbar spine    His complaint is instability with walking that started last Thanks.  Denied any traumatic falls.  Notes no pain or weakness with walking.  He feels like he is leaning forward and has a hard time slowing down his gait.  Feels like things have not changed since onset. Denies any bowel/bladder changes. Notes back pain 3 years ago resolved with chiropractic services. No injections.notes can no longer get adjustments due to stenosis found on MRI. Concerned about the instability and is why he was referred to see us.    Past treatments tried:  - Physical therapy: no recent PT, but has been engaging his glutes and does bridges before going on a walk  - Injections: no recent injections  - Medications: daily 600mg of ibuprofen, 2 ibuprofen pm. General arthritic pain.     Oswestry (CARRILLO) Questionnaire        2024    10:01 AM   OSWESTRY DISABILITY INDEX   Count 9   Sum 1   Oswestry Score (%) 2.22 %            Past Medical History:     Past Medical History:   Diagnosis Date    Atrial fibrillation (H)     Coronary artery disease     Hyperlipidemia     Hypertension     Thyroid disease             Past Surgical History:     Past Surgical History:   Procedure Laterality Date    HERNIA REPAIR, INGUINAL RT/LT      OTHER SURGICAL HISTORY      upper and lower partial plate            Social History:     Social History     Tobacco Use    Smoking status: Former     Current packs/day: 0.00     Types: Cigarettes     Start date: 1966     Quit date: 1992     Years since quittin.6    Smokeless tobacco: Never   Substance Use Topics    Alcohol use: No            Family History:     Family History   Problem  Relation Age of Onset    Other - See Comments Mother         old age    Cancer Father         prostate    Cancer Brother     Diabetes No family hx of     Glaucoma No family hx of     Macular Degeneration No family hx of     Hypertension No family hx of     Melanoma No family hx of     Skin Cancer No family hx of             Allergies:     Allergies   Allergen Reactions    Atorvastatin      Lipitor--muscle aches    Short Ragweed Pollen Ext Cough            Medications:     Current Outpatient Medications   Medication Sig Dispense Refill    Acetaminophen 650 MG TABS Take by mouth daily      apixaban ANTICOAGULANT (ELIQUIS) 5 MG tablet Take 1 tablet (5 mg) by mouth 2 times daily 180 tablet 3    cholecalciferol (VITAMIN D-1000 MAX ST) 25 MCG (1000 UT) TABS Take 2 tablets by mouth daily 180 tablet 3    Coenzyme Q10-Vitamin E (QUNOL ULTRA COQ10 PO)       Diphenhydramine-APAP, sleep, (ACETAMINOPHEN PM PO) Take 500 mg by mouth      FLUZONE HIGH-DOSE QUADRIVALENT 0.7 ML LAVON injection Pt had flu shot 8/25      ketoconazole (NIZORAL) 2 % external cream Apply topically daily As needed to affected areas of rash or scale 60 g 11    ketoconazole (NIZORAL) 2 % external cream Apply to the feet and inbetween the toes daily x 1 month then as needed. 60 g 3    ketoconazole (NIZORAL) 2 % external shampoo Apply topically daily as needed for itching or irritation 120 mL 11    levothyroxine (SYNTHROID/LEVOTHROID) 88 MCG tablet Take 1 tablet (88 mcg) by mouth daily 90 tablet 2    Melatonin 10 MG TABS tablet Take 10 mg by mouth      Multiple Vitamin (MULTI-VITAMIN PO) Take by mouth daily       rosuvastatin (CRESTOR) 40 MG tablet Take 1 tablet (40 mg) by mouth daily 90 tablet 3    saw palmetto 450 MG CAPS capsule       sotalol (BETAPACE) 80 MG tablet Take 1 tablet (80 mg) by mouth 2 times daily No further refills by provider. Needs to establish care with a general cardiologist 120 tablet 0    triamcinolone (KENALOG) 0.1 % external cream APPLY  "TO AFFECTED AREA on scalp and lower legs as needed twice daily until rashes clear 45 g 1    vitamin B-Complex Take 1 tablet by mouth daily      zinc gluconate 50 MG tablet Take 1 tablet (50 mg) by mouth daily       No current facility-administered medications for this visit.             Review of Systems:     A 10 point ROS was performed and reviewed.  See HPI and Epic intake form for pertinent positive.          Physical Exam:   Vitals: Ht 1.74 m (5' 8.5\")   Wt 98.9 kg (218 lb)   BMI 32.66 kg/m    Constitutional: Patient is healthy, well-nourished and appears stated age.  Respiratory: Patient is breathing normally and in no respiratory distress.  Skin: No suspicious rashes or lesions  Gait: Non-antalgic gait without use of assistive devices. Able to tandem gait without difficulty.   Neurologic - Sensation intact to light touch bilaterally. Deep tendon reflexes 2+ patella.   Musculoskeletal: Strength: 5/5 bilateral HF KE DF PF  Lumbosacral Spine:  Appearance - Normal lordosis  Palpation - Non-tender to palpation, facets non-tender. SI joints non-tender.   ROM - Full, no pain    Hip: No pain with hip log roll and no tenderness over the greater trochanters. Lor negative.          Imaging:   We independently reviewed and interpreted the following imaging at this clinic visit which were also reviewed with patient    XR lumbar 4 views  Mild right lumbar degenerative scoliosis. No PI to LL mismatch. L4-5 lateral listhesis    MR lumbar 7/10/2024  Severe L4-5 stenosis Modic endplate changes,      Assessment:     78 year old male with     Right mild degenerative lumbar scoliosis  L4-5 severe lumbar stenosis with neurogenic claudication     He presents with a degenerative scoliosis and severe lumbar stenosis at L4-5.  He has instability and complaints of leaning forward with walking for long periods of time.  He denies any pain or limitations in his back.  He denies any bowel or bladder changes.  He denies any " paresthesia.  Plan to continue observation.  Will plan on sending him for some physical therapy.  If he develops any debilitating pain complaints in the later future plan will be for bilateral L4-5 TFESI.  Educated that he can get this injection without a clinic visit.     Plan:     Follow up prn  PT  L4-5 bilateral TFESI if pain develops. Does not need a clinic appointment.     Plan formulated with Dr. Bartlett who saw patient and examined the patient and reviewed imaging. We used the patient's imaging and models to explain their pathophysiology and treatment options. All the patient's questions were answered to their full satisfaction.     Thank you for allowing me to be a part of this patient's care.    Bishop LASHA Grace PA-C   Orthopedic Spine Surgery    Attending MD (Dr. Johnnie Bartlett) : I personally performed greater than 50% of the effort related to this patient visit and am responsible for the medical decision making and billing in this case.  I met with the patient, reviewed and verified the history and physical exam of the patient and discussed the patient's management with the other clinical providers involved in this patient's care including any involved residents or physicians assistants. I also personally reviewed the imaging and I personally formulated the treatment plan and diagnosis in their entirety.  I reviewed the above note and agree with the documented findings and plan of care, which were communicated to the patient.      Johnnie Bartlett MD

## 2024-07-24 ENCOUNTER — THERAPY VISIT (OUTPATIENT)
Dept: PHYSICAL THERAPY | Facility: CLINIC | Age: 79
End: 2024-07-24
Payer: COMMERCIAL

## 2024-07-24 DIAGNOSIS — R29.898 WEAKNESS OF BOTH LOWER EXTREMITIES: Primary | Chronic | ICD-10-CM

## 2024-07-24 PROCEDURE — 97161 PT EVAL LOW COMPLEX 20 MIN: CPT | Mod: GP

## 2024-07-24 PROCEDURE — 97110 THERAPEUTIC EXERCISES: CPT | Mod: GP

## 2024-07-24 NOTE — PROGRESS NOTES
PHYSICAL THERAPY EVALUATION  Type of Visit: Evaluation              Subjective   Pt is a 79 yo male presenting to PT with difficulty walking; states that he began to notice a forward trunk lean while he was walking beginning last Thanksgiving.  When he would try to come to a stop he felt that he couldn't control his momentum, and he would also feel that his back would tighten up. States before Thanksgiving that he was helping take care of his sister and fell off of his own fitness routine. Before this began, he was walking 5-10 miles at a time and can now walk 3-4. He recently began working on his glute strength and activating his glutes while he was walking; feels that this may be helping because he hasn't noticed it as much the past 3 walks. He is seeing a chiropractor for stretching which helps relieve his symptoms. He wishes to increase his walking mileage. Xray imaging reveals multilevel degenerative changes most prominent at L5-S1, and anterolisthesis of S1-2. MRI imaging reveals multilevel spondylosis and spinal canal stenosis at L5-S1 and B neuro foraminal stenosis at L4-5.          Presenting condition or subjective complaint: difficulty walking, lean forward at the waist, feel unbalanced when trying to stop  Date of onset: 11/01/23    Relevant medical history: Arthritis; Bladder or bowel problems; Heart problems; Overweight; Sleep disorder like apnea; Thyroid problems; Vision problems   Dates & types of surgery: hernia and oral surgery over 25 years ago    Prior diagnostic imaging/testing results: MRI; X-ray     Prior therapy history for the same diagnosis, illness or injury: Yes a couple years ago I had severe back pain causing extreme pain in my right hamstring, have visited chiropractor over the years for back pain    Prior Level of Function  Transfers: Independent  Ambulation: Independent  ADL: Independent    Living Environment  Social support: Alone   Type of home: Apartment/condo   Stairs to enter  the home: No       Ramp: No   Stairs inside the home: No       Help at home: None  Equipment owned:       Employment: No    Hobbies/Interests: walking, reading, visiting with friends    Patient goals for therapy: walk longer distances without balance problems    Pain assessment: Pain present     Objective   LUMBAR SPINE EVALUATION  POSTURE:  Able to maintain upright static posture  GAIT:   Gait Deviations: WNL  BALANCE/PROPRIOCEPTION:  < 5 sec SLS B , no pain  ROM:  Lumbar ROM WNL and non-painful  STRENGTH:  4/5 hip abd strength B, 3+/5 hip ext strength B  NEURAL TENSION:  - SLR B  FLEXIBILITY:  Hamstring WNL  FUNCTIONAL TESTS:  DL squat: anterior knee translation, no pain.  PALPATION:  No tenderness to B lumbar paraspinals  SPINAL SEGMENTAL CONCLUSIONS: WNL    Assessment & Plan   CLINICAL IMPRESSIONS  Medical Diagnosis: Back pain    Treatment Diagnosis: generalized weakness, difficulty walking, impaired gait   Impression/Assessment: Patient is a 78 year old male with back pain and difficulty walking complaints.  The following significant findings have been identified: Pain, Decreased strength, Impaired balance, Impaired muscle performance, and Decreased activity tolerance. These impairments interfere with their ability to perform recreational activities, household mobility, and community mobility as compared to previous level of function.     Clinical Decision Making (Complexity):  Clinical Presentation: Stable/Uncomplicated  Clinical Presentation Rationale: based on medical and personal factors listed in PT evaluation  Clinical Decision Making (Complexity): Low complexity    PLAN OF CARE  Treatment Interventions:  Modalities: Cryotherapy, E-stim  Interventions: Gait Training, Manual Therapy, Neuromuscular Re-education, Therapeutic Activity, Therapeutic Exercise    Long Term Goals     PT Goal 1  Goal Identifier: Ammbulation  Goal Description: Pt will ambulate for 8 miles with normalized gait mechanics  Rationale:  to maximize safety and independence with performance of ADLs and functional tasks;to maximize safety and independence within the community;to maximize safety and independence with transportation  Goal Progress: Pt can walk up to 4 miles but experiences fatigue and forward trunk lean  Target Date: 09/18/24      Frequency of Treatment: 1x/wk  Duration of Treatment: 8 wks    Recommended Referrals to Other Professionals:  none  Education Assessment:   Learner/Method: Patient;Demonstration;Pictures/Video    Risks and benefits of evaluation/treatment have been explained.   Patient/Family/caregiver agrees with Plan of Care.     Evaluation Time:     PT Eval, Low Complexity Minutes (06966): 25     Signing Clinician: ROSALINA FRANCIS PT        [facility]                                                                                   OUTPATIENT PHYSICAL THERAPY      PLAN OF TREATMENT FOR OUTPATIENT REHABILITATION   Patient's Last Name, First Name, Jonathon Raymond YOB: 1945   Provider's Name   [facility]   Medical Record No.  [id number]     Onset Date: 11/01/23  Start of Care Date: 07/24/24     Medical Diagnosis:  Back pain      PT Treatment Diagnosis:  generalized weakness, difficulty walking, impaired gait Plan of Treatment  Frequency/Duration: 1x/wk/ 8 wks    Certification date from 07/24/24 to 09/18/24         See note for plan of treatment details and functional goals     ROSALINA FRANCIS, PT                         I CERTIFY THE NEED FOR THESE SERVICES FURNISHED UNDER        THIS PLAN OF TREATMENT AND WHILE UNDER MY CARE     (Physician attestation of this document indicates review and certification of the therapy plan).              Referring Provider:  Johnnie Bartlett    Initial Assessment  See Epic Evaluation- Start of Care Date: 07/24/24

## 2024-08-10 ENCOUNTER — TELEPHONE (OUTPATIENT)
Dept: CARDIOLOGY | Facility: CLINIC | Age: 79
End: 2024-08-10
Payer: COMMERCIAL

## 2024-08-10 NOTE — TELEPHONE ENCOUNTER
8/10 Patient confirmed scheduled appointment:  Date: 12/4/2024  Time: 10:00 am  Visit type: Return Cardiology  Provider: Acosta  Location: [facility]  Testing/imaging: N/A  Additional notes: N/A

## 2024-08-10 NOTE — TELEPHONE ENCOUNTER
8/10 Left Voicemail (1st Attempt) and Sent Mychart (1st Attempt) for the patient to call back and schedule the following:    Appointment type: Return Cardiology  Provider: Acosta  Return date: 12/4/2024  Specialty phone number: [phone] opt 1  Additional appointment(s) needed: n/a  Additonal Notes: n/a

## 2024-08-12 ENCOUNTER — THERAPY VISIT (OUTPATIENT)
Dept: PHYSICAL THERAPY | Facility: CLINIC | Age: 79
End: 2024-08-12
Payer: COMMERCIAL

## 2024-08-12 DIAGNOSIS — R29.898 WEAKNESS OF BOTH LOWER EXTREMITIES: Primary | Chronic | ICD-10-CM

## 2024-08-12 PROCEDURE — 97110 THERAPEUTIC EXERCISES: CPT | Mod: GP | Performed by: PHYSICAL THERAPIST

## 2024-08-20 ENCOUNTER — THERAPY VISIT (OUTPATIENT)
Dept: PHYSICAL THERAPY | Facility: CLINIC | Age: 79
End: 2024-08-20
Payer: COMMERCIAL

## 2024-08-20 DIAGNOSIS — R29.898 WEAKNESS OF BOTH LOWER EXTREMITIES: Primary | Chronic | ICD-10-CM

## 2024-08-20 PROCEDURE — 97110 THERAPEUTIC EXERCISES: CPT | Mod: GP

## 2024-08-20 PROCEDURE — 97140 MANUAL THERAPY 1/> REGIONS: CPT | Mod: GP

## 2024-08-20 PROCEDURE — 97530 THERAPEUTIC ACTIVITIES: CPT | Mod: GP

## 2024-08-26 ENCOUNTER — THERAPY VISIT (OUTPATIENT)
Dept: PHYSICAL THERAPY | Facility: CLINIC | Age: 79
End: 2024-08-26
Payer: COMMERCIAL

## 2024-08-26 DIAGNOSIS — R29.898 WEAKNESS OF BOTH LOWER EXTREMITIES: Primary | Chronic | ICD-10-CM

## 2024-08-26 PROCEDURE — 97110 THERAPEUTIC EXERCISES: CPT | Mod: GP | Performed by: PHYSICAL THERAPIST

## 2024-08-26 PROCEDURE — 97140 MANUAL THERAPY 1/> REGIONS: CPT | Mod: GP | Performed by: PHYSICAL THERAPIST

## 2024-10-04 ENCOUNTER — MYC MEDICAL ADVICE (OUTPATIENT)
Dept: INTERNAL MEDICINE | Facility: CLINIC | Age: 79
End: 2024-10-04
Payer: COMMERCIAL

## 2024-10-11 PROBLEM — R29.898 WEAKNESS OF BOTH LOWER EXTREMITIES: Chronic | Status: RESOLVED | Noted: 2024-07-24 | Resolved: 2024-10-11

## 2024-10-11 NOTE — PROGRESS NOTES
Pt is being discharged secondary to lack of follow up.  Please refer to initial eval or last progress note for final values.  
alone

## 2024-10-16 NOTE — TELEPHONE ENCOUNTER
Prescription to be refilled by PCP; has not seen Quiana Daley since 2022. Kaye Gao RN on 10/16/2024 at 10:54 AM

## 2024-10-30 ENCOUNTER — OFFICE VISIT (OUTPATIENT)
Dept: DERMATOLOGY | Facility: CLINIC | Age: 79
End: 2024-10-30
Attending: DERMATOLOGY
Payer: COMMERCIAL

## 2024-10-30 DIAGNOSIS — D22.9 MULTIPLE NEVI: ICD-10-CM

## 2024-10-30 DIAGNOSIS — L82.1 SK (SEBORRHEIC KERATOSIS): Primary | ICD-10-CM

## 2024-10-30 DIAGNOSIS — L21.9 DERMATITIS, SEBORRHEIC: ICD-10-CM

## 2024-10-30 PROCEDURE — 99214 OFFICE O/P EST MOD 30 MIN: CPT | Performed by: DERMATOLOGY

## 2024-10-30 ASSESSMENT — PAIN SCALES - GENERAL: PAINLEVEL_OUTOF10: NO PAIN (0)

## 2024-10-30 NOTE — LETTER
10/30/2024       RE: Jonathon Guerra  [address]   [facility] [address]     Dear Colleague,    Thank you for referring your patient, Jonathon Guerra, to the [facility] DERMATOLOGY CLINIC [place] at [facility]. Please see a copy of my visit note below.    [facility] Dermatology Note  Encounter Date: Oct 30, 2024  Office Visit     Dermatology Problem List:  # Seborrheic dermatitis, scalp. Improved with use of Ketoconazole shampoo.   # Tinea pedis with onychomycosis. 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       Follow-up: 1 year(s) in-person, or earlier for new or changing lesions    Staff:     Elsa English MD  ____________________________________________    CC: Skin Check (Spot on the left wrist he would like checked. )    HPI:  Mr. Jonathon Guerra is a(n) 79 year old male who presents today as a return patient for a skin check.  Notes a 4 day history of an itchy spot on the wrist aggravated by his Fitbit.  Scalp is doing much better with ketoconazole shampoo but notes some itchy areas of ears.     Patient is otherwise feeling well, without additional skin concerns.     Labs Reviewed:  N/A    Physical Exam:  Vitals: There were no vitals taken for this visit.  SKIN: Total skin excluding the undergarment areas was performed. The exam included the head/face, neck, both arms, chest, back, abdomen, both legs, digits and/or nails.   - waxy stuck on papules including area of concern on left wrist  -  mild scale of ears  - nevi with no atypia  - cherry angiomas  - lentigo  - No other lesions of concern on areas examined.     Medications:  Current Outpatient Medications   Medication Sig Dispense Refill     apixaban ANTICOAGULANT (ELIQUIS) 5 MG tablet Take 1 tablet (5 mg) by mouth 2 times daily 180 tablet 3     Coenzyme Q10-Vitamin E (QUNOL ULTRA COQ10 PO)        FLUZONE HIGH-DOSE QUADRIVALENT 0.7 ML LAVON injection Pt had flu shot 8/25       ketoconazole (NIZORAL) 2 % external cream Apply topically daily As needed to affected areas of rash or scale 60 g 11     ketoconazole (NIZORAL) 2 % external shampoo Apply topically daily as needed for itching or irritation 120 mL 11     levothyroxine (SYNTHROID/LEVOTHROID) 88 MCG tablet Take 1 tablet (88 mcg) by mouth daily 90 tablet 2     Multiple Vitamin (MULTI-VITAMIN PO) Take by mouth daily        rosuvastatin (CRESTOR) 40 MG tablet Take 1 tablet (40 mg) by mouth daily 90 tablet 3     saw palmetto 450 MG CAPS capsule        sotalol (BETAPACE) 80 MG tablet Take 1 tablet (80 mg) by mouth 2 times daily No further refills by provider. Needs to establish care with a general cardiologist 120 tablet 0     triamcinolone (KENALOG) 0.1 % external cream APPLY TO AFFECTED AREA on scalp and lower legs as needed twice daily until rashes clear 45 g 1     vitamin B-Complex Take 1 tablet by mouth daily       zinc gluconate 50 MG tablet Take 1 tablet (50 mg) by mouth daily       Acetaminophen 650 MG TABS Take by mouth daily       cholecalciferol (VITAMIN D-1000 MAX ST) 25 MCG (1000 UT) TABS Take 2 tablets by mouth daily 180 tablet 3     Diphenhydramine-APAP, sleep, (ACETAMINOPHEN PM PO) Take 500 mg by mouth       ketoconazole (NIZORAL) 2 % external cream Apply to the feet and inbetween the toes daily x 1 month then as needed. 60 g 3     Melatonin 10 MG TABS tablet Take 10 mg by mouth       No current facility-administered medications for this visit.      Past Medical History:   Patient  Active Problem List   Diagnosis     Elevated prostate specific antigen (PSA)     Hypothyroidism     Atrial fibrillation (H)     Insomnia     Gastroesophageal reflux disease without esophagitis     Hyperlipidemia     Impotence of organic origin     Urticaria     Arthropathy, lower leg     Hyperlipidemia     Coronary artery disease     Tinea corporis     Pure hypercholesterolemia     SUKH (obstructive sleep apnea)     Benign paroxysmal positional vertigo, right     Sciatica without back pain, left     Abdominal aortic aneurysm (AAA) without rupture (H)     Past Medical History:   Diagnosis Date     Atrial fibrillation (H)      Coronary artery disease      Hyperlipidemia      Hypertension      Thyroid disease        CC Elsa English MD  [address] [place] [id number] on close of this encounter.       Again, thank you for allowing me to participate in the care of your patient.      Sincerely,    Elsa English MD

## 2024-10-30 NOTE — NURSING NOTE
Dermatology Rooming Note    Jonathon Guerra's goals for this visit include:   Chief Complaint   Patient presents with    Skin Check     Spot on the left wrist he would like checked.      Kleber Rosa, EMT  Clinic Support  [facility]     [phone]    Employed by [employer]

## 2024-10-30 NOTE — PROGRESS NOTES
[facility] Dermatology Note  Encounter Date: Oct 30, 2024  Office Visit     Dermatology Problem List:  # Seborrheic dermatitis, scalp. Improved with use of Ketoconazole shampoo.   # Tinea pedis with onychomycosis. 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       Follow-up: 1 year(s) in-person, or earlier for new or changing lesions    Staff:     Elsa English MD  ____________________________________________    CC: Skin Check (Spot on the left wrist he would like checked. )    HPI:  Mr. Jonathon Guerra is a(n) 79 year old male who presents today as a return patient for a skin check.  Notes a 4 day history of an itchy spot on the wrist aggravated by his Fitbit.  Scalp is doing much better with ketoconazole shampoo but notes some itchy areas of ears.     Patient is otherwise feeling well, without additional skin concerns.     Labs Reviewed:  N/A    Physical Exam:  Vitals: There were no vitals taken for this visit.  SKIN: Total skin excluding the undergarment areas was performed. The exam included the head/face, neck, both arms, chest, back, abdomen, both legs, digits and/or nails.   - waxy stuck on papules including area of concern on left wrist  - mild scale of ears  - nevi with no atypia  - cherry angiomas  - lentigo  - No other lesions of concern on areas examined.     Medications:  Current Outpatient Medications   Medication Sig Dispense Refill    apixaban ANTICOAGULANT (ELIQUIS) 5 MG tablet Take 1 tablet (5 mg) by mouth 2 times daily 180 tablet 3    Coenzyme Q10-Vitamin E  (QUNOL ULTRA COQ10 PO)       FLUZONE HIGH-DOSE QUADRIVALENT 0.7 ML LAVON injection Pt had flu shot 8/25      ketoconazole (NIZORAL) 2 % external cream Apply topically daily As needed to affected areas of rash or scale 60 g 11    ketoconazole (NIZORAL) 2 % external shampoo Apply topically daily as needed for itching or irritation 120 mL 11    levothyroxine (SYNTHROID/LEVOTHROID) 88 MCG tablet Take 1 tablet (88 mcg) by mouth daily 90 tablet 2    Multiple Vitamin (MULTI-VITAMIN PO) Take by mouth daily       rosuvastatin (CRESTOR) 40 MG tablet Take 1 tablet (40 mg) by mouth daily 90 tablet 3    saw palmetto 450 MG CAPS capsule       sotalol (BETAPACE) 80 MG tablet Take 1 tablet (80 mg) by mouth 2 times daily No further refills by provider. Needs to establish care with a general cardiologist 120 tablet 0    triamcinolone (KENALOG) 0.1 % external cream APPLY TO AFFECTED AREA on scalp and lower legs as needed twice daily until rashes clear 45 g 1    vitamin B-Complex Take 1 tablet by mouth daily      zinc gluconate 50 MG tablet Take 1 tablet (50 mg) by mouth daily      Acetaminophen 650 MG TABS Take by mouth daily      cholecalciferol (VITAMIN D-1000 MAX ST) 25 MCG (1000 UT) TABS Take 2 tablets by mouth daily 180 tablet 3    Diphenhydramine-APAP, sleep, (ACETAMINOPHEN PM PO) Take 500 mg by mouth      ketoconazole (NIZORAL) 2 % external cream Apply to the feet and inbetween the toes daily x 1 month then as needed. 60 g 3    Melatonin 10 MG TABS tablet Take 10 mg by mouth       No current facility-administered medications for this visit.      Past Medical History:   Patient Active Problem List   Diagnosis    Elevated prostate specific antigen (PSA)    Hypothyroidism    Atrial fibrillation (H)    Insomnia    Gastroesophageal reflux disease without esophagitis    Hyperlipidemia    Impotence of organic origin    Urticaria    Arthropathy, lower leg    Hyperlipidemia    Coronary artery disease    Tinea corporis    Pure  hypercholesterolemia    SUKH (obstructive sleep apnea)    Benign paroxysmal positional vertigo, right    Sciatica without back pain, left    Abdominal aortic aneurysm (AAA) without rupture (H)     Past Medical History:   Diagnosis Date    Atrial fibrillation (H)     Coronary artery disease     Hyperlipidemia     Hypertension     Thyroid disease        CC Elsa English MD  [address] [place] [id number] [id number]  [place] [address] on close of this encounter.

## 2024-11-08 ENCOUNTER — OFFICE VISIT (OUTPATIENT)
Dept: NEUROLOGY | Facility: CLINIC | Age: 79
End: 2024-11-08
Attending: INTERNAL MEDICINE
Payer: COMMERCIAL

## 2024-11-08 ENCOUNTER — PRE VISIT (OUTPATIENT)
Dept: NEUROLOGY | Facility: CLINIC | Age: 79
End: 2024-11-08

## 2024-11-08 VITALS — SYSTOLIC BLOOD PRESSURE: 143 MMHG | OXYGEN SATURATION: 96 % | HEART RATE: 67 BPM | DIASTOLIC BLOOD PRESSURE: 79 MMHG

## 2024-11-08 DIAGNOSIS — M48.061 SPINAL STENOSIS OF LUMBAR REGION, UNSPECIFIED WHETHER NEUROGENIC CLAUDICATION PRESENT: ICD-10-CM

## 2024-11-08 DIAGNOSIS — R93.89 ABNORMAL FINDINGS ON DIAGNOSTIC IMAGING OF OTHER SPECIFIED BODY STRUCTURES: ICD-10-CM

## 2024-11-08 DIAGNOSIS — M54.32 SCIATICA WITHOUT BACK PAIN, LEFT: Primary | ICD-10-CM

## 2024-11-08 PROCEDURE — 99205 OFFICE O/P NEW HI 60 MIN: CPT | Performed by: PSYCHIATRY & NEUROLOGY

## 2024-11-08 PROCEDURE — 99417 PROLNG OP E/M EACH 15 MIN: CPT | Performed by: PSYCHIATRY & NEUROLOGY

## 2024-11-08 PROCEDURE — G2211 COMPLEX E/M VISIT ADD ON: HCPCS | Performed by: PSYCHIATRY & NEUROLOGY

## 2024-11-08 NOTE — NURSING NOTE
Chief Complaint   Patient presents with    Consult     New neurology - difficulty walking     Juan Aquino

## 2024-11-08 NOTE — LETTER
11/8/2024       RE: Jonathon Guerra  [address]   [facility] [address]     Dear Colleague,    Thank you for referring your patient, Jonathon Guerra, to the [facility] NEUROLOGY CLINIC [place] at [facility]. Please see a copy of my visit note below.    [facility] Neurology Consultation    Jonathon Guerra MRN# [id number]   Age: 79 year old YOB: 1945     Requesting physician: Rafy Martines     Reason for Consultation: difficulty walking      History of Presenting Symptoms:   Jonathon Guerra is a 79 year old male who presents today for evaluation of difficulty walking.  The patient has a pertinent medical history of A-fib (eliquis, sotalol + metoprolol), Hypothyroidism, HTN, HLD (rosuvastatin), SUKH, CAD, R-BPPV, and GERD.      The patient was seen with orthopedics providers 7/22/2024 given ongoing instability with walking occurring for 9-10 months (onset around Thanksgiving 2023) in the setting of lower back pain 3 years prior that resolved.  He indicated learning forward with walking, and difficulty slowing down when walking. Prior imaging did show severe lumbar spinal stenosis at L4-5 w/related neuroforaminal stenosis at the same levels (abutment of b/l L4 nerve roots), and his symptoms did worsen as he walked more and more.  He had normal strength on exam, and was to trial PT and L4-5 b/l TFESI if pain developed.      An MRI brain 7/10/2024 did show no major findings of infarction, tumor, or regional brain atrophy consistent with stroke, neurodegenerative conditions, or regional injury/insult.    Today, the patient mentions that he has slowly started to notice that he bends forward at the waist when walking.  This was most noticeable as walking longer distances, but has started to become a more immediate issue.  He is doing PT and core exercises, which is helping to some degree (improves posture, and  "limits limited back motion).  He doesn't have bowel incontinence.  He does have to wear pads for urinary incontinence, and has done so for about 7 years.  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  He has aches in his legs now due to walking more, but never has had shooting/radiating pain in his legs.  He doesn't have numbness or tingling, or discoloration of his legs b/l.  He notes always stubbing his toes on sidewalks when working, but hasn't noticed issues like this or other focal weakness of hips/standing in the last few years.    He doesn't have syncope, or orthostatic hypotension.  There is no tremors in hands or legs consistently, but occasionally he may notice some shaking when doing motions like scratching his head (not noticed with writing or using items). There is some drooling occurring, but this isn't really consistent.  He hasn't had changes with swallowing.  He doesn't report visual hallucinations, but does have migraines with visual obscuration/aura w/out the headache associated.  He describes focusing on a small area of vision when reading and seeing an \"escher drawing\".      He describes that in his childhood he had periods where his legs would give out.  This led to hospitalization, and being told he had \"a touch of polio\".  He has had toes crossing over themselves (3rd, 4th toes b/l adducting over other toes, continued).  He also mentions that when laying down on his back as a child, he may have periods where he shook in his whole body, requiring his sister to turn him over to make it stop.  In 2002, he mentions that when he started a statin drug he felt his mind had similar feelings as he did when he was a child.  He was in [place], but was never sent overseas (no agent orange exposure).      Medications:   Levothyroxine  Rosuvastatin  Sotalol  Apixaban  Zinc     Physical Exam:   Vitals: BP (!) 143/79 (BP " Location: Right arm, Patient Position: Sitting, Cuff Size: Adult Regular)   Pulse 67   SpO2 96%    General: Seated comfortably in no acute distress.  HEENT: Optic discs sharp and vasculature normal on funduscopic exam.   Musculoskeletal: feet with toes that cross over (4th cross over third) b/l, normal aches on feet.  Neurologic:     Mental Status: Fully alert, attentive and oriented. Speech clear and fluent, no paraphasic errors. MiniCOG 5/5.     Cranial Nerves: Visual fields intact. PERRL. EOMI with normal smooth pursuit. Facial sensation intact/symmetric. Facial movements symmetric. Hearing not formally tested but intact to conversation. Palate elevation symmetric, uvula midline. No dysarthria. Shoulder shrug strong bilaterally. Tongue protrusion midline.     Motor: No tremors or other abnormal movements observed. Muscle tone normal slightly increased in biceps and upper extremities (Seems more like paratonia that spasticity or rigidity). No pronator drift. Normal/symmetric rapid finger tapping (no bradykinesia with these movements as well as with foot tapping exercises/tasks).   Motor:     Right Left   Arm abduction 5/5 5/5   Elbow Flex 5/5 5/5   Elbow Extension 5/5 5/5   Wrist Extension 5/5 5/5   FDI  5/5 5/5   APB 5/5 5/5     5/5 5/5   Hip FLexion 4/5 4/5   Knee Flexion 5/5 5/5   Knee Extension 5/5 5/5   Dorsiflexion  5/5 5/5        Deep Tendon Reflexes: 2+/symmetric throughout upper extremities, and 1+ on patellar with absent achilles. No clonus. Toes downgoing bilaterally.     Sensory: Diminished light touch and pinprick in toes but not necessarily at ankles. Vibration reduced through toes, ankles, but normal (15+ seconds) at mid leg and knees.  Some errors in reporting proprioception of great toes b/l.  Sway with Romberg.      Coordination: Finger-nose-finger  intact without dysmetria. Rapid alternating movements intact/symmetric with normal speed and rhythm.     Gait: stands up slowly but without  "need of extra assistance with arms across chest. Normal width stance. Starts and stops easily with walking.  Good arm swing. Slightly flexed at hips when walking, leaning forward.  No toe drag or circumduction of legs.  Pull back x3 was negative.          Data: Pertinent prior to visit   Imaging:  MRI brain: 7/10/2024:  - Normal upon review.  There was some arachnoid granulation of left parietal bone, and some mild findings of T2 hyperintensities of cerebral white matter consistent with microvascular ischemic changes (normal for age).    MRI lumbar spine: 7/10/2024:  MPRESSION:  1. Counting down from the C2 vertebral body, there is lumbarization of the S1 vertebral body.  2. Multilevel lumbar spondylosis, most pronounced with severe spinal canal stenosis at L5-S1, and moderate bilateral neuroforaminal stenosis at L4-L5 with likely abutment of the exiting bilateral L4 nerves.   ~ fatty atrophy noted in Paraspinous musculature.  Ligamentum flavum hypertrophy noted at L2-S1.         Assessment and Plan:   Assessment:  Spinal stenosis w/possible multiple level radiculopathy (L4 predominant)    The patient reports odd symptoms of legs giving way in his youth, said to be \"a touch of polio\" from what he recalls.  He has mild changes at best in terms of strength of his HF, but this may be more decondition given the lack of affect it has on gait or that it is present in terms of isolation. Given the spinal stenosis in the lumbar region, it is possible it is impacting his gait, but I would expect more of a painful presentation if this was the entire case.  While I didn't notice L4 related sensory change or weakness, I do think imaging and his history give weight towards obtaining an EMG to look for potential etiologies that are mild but impactful on his walking.  He does have some mild sensory changes, so I would also ask him to to obtain neuropathy related labs.    Plan:  - EMG b/l lower extremities  - B12, SPEP, " Immunofixation, vitamin E, TSH, CK, ESR, CRP    Follow up in Neurology clinic in 3-5 months, or should new concerns arise.    ILIR Law D.O.   of Neurology    Total time today (75 min) in this patient encounter was spent on pre-charting, counseling and/or coordination of care.  The patient is in agreement with this plan and has no further questions. The longitudinal plan of care for the diagnosis(es)/condition(s) as documented were addressed during this visit. Due to the added complexity in care, I will continue to support Jonathon in the subsequent management and with ongoing continuity of care.        Again, thank you for allowing me to participate in the care of your patient.      Sincerely,    Rashad Law, DO

## 2024-11-08 NOTE — PROGRESS NOTES
[facility] Neurology Consultation    Jonathon Guerra MRN# [id number]   Age: 79 year old YOB: 1945     Requesting physician: Rafy Martines     Reason for Consultation: difficulty walking      History of Presenting Symptoms:   Jonathon Guerra is a 79 year old male who presents today for evaluation of difficulty walking.  The patient has a pertinent medical history of A-fib (eliquis, sotalol + metoprolol), Hypothyroidism, HTN, HLD (rosuvastatin), SUKH, CAD, R-BPPV, and GERD.      The patient was seen with orthopedics providers 7/22/2024 given ongoing instability with walking occurring for 9-10 months (onset around Thanksgiving 2023) in the setting of lower back pain 3 years prior that resolved.  He indicated learning forward with walking, and difficulty slowing down when walking. Prior imaging did show severe lumbar spinal stenosis at L4-5 w/related neuroforaminal stenosis at the same levels (abutment of b/l L4 nerve roots), and his symptoms did worsen as he walked more and more.  He had normal strength on exam, and was to trial PT and L4-5 b/l TFESI if pain developed.      An MRI brain 7/10/2024 did show no major findings of infarction, tumor, or regional brain atrophy consistent with stroke, neurodegenerative conditions, or regional injury/insult.    Today, the patient mentions that he has slowly started to notice that he bends forward at the waist when walking.  This was most noticeable as walking longer distances, but has started to become a more immediate issue.  He is doing PT and core exercises, which is helping to some degree (improves posture, and limits limited back motion).  He doesn't have bowel incontinence.  He does have to wear pads for urinary incontinence, and has done so for about 7 years.  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  He has aches in his legs now due to walking  "more, but never has had shooting/radiating pain in his legs.  He doesn't have numbness or tingling, or discoloration of his legs b/l.  He notes always stubbing his toes on sidewalks when working, but hasn't noticed issues like this or other focal weakness of hips/standing in the last few years.    He doesn't have syncope, or orthostatic hypotension.  There is no tremors in hands or legs consistently, but occasionally he may notice some shaking when doing motions like scratching his head (not noticed with writing or using items). There is some drooling occurring, but this isn't really consistent.  He hasn't had changes with swallowing.  He doesn't report visual hallucinations, but does have migraines with visual obscuration/aura w/out the headache associated.  He describes focusing on a small area of vision when reading and seeing an \"escher drawing\".      He describes that in his childhood he had periods where his legs would give out.  This led to hospitalization, and being told he had \"a touch of polio\".  He has had toes crossing over themselves (3rd, 4th toes b/l adducting over other toes, continued).  He also mentions that when laying down on his back as a child, he may have periods where he shook in his whole body, requiring his sister to turn him over to make it stop.  In 2002, he mentions that when he started a statin drug he felt his mind had similar feelings as he did when he was a child.  He was in [place], but was never sent overseas (no agent orange exposure).      Medications:   Levothyroxine  Rosuvastatin  Sotalol  Apixaban  Zinc     Physical Exam:   Vitals: BP (!) 143/79 (BP Location: Right arm, Patient Position: Sitting, Cuff Size: Adult Regular)   Pulse 67   SpO2 96%    General: Seated comfortably in no acute distress.  HEENT: Optic discs sharp and vasculature normal on funduscopic exam.   Musculoskeletal: feet with toes that cross over (4th cross over third) b/l, normal aches on " feet.  Neurologic:     Mental Status: Fully alert, attentive and oriented. Speech clear and fluent, no paraphasic errors. MiniCOG 5/5.     Cranial Nerves: Visual fields intact. PERRL. EOMI with normal smooth pursuit. Facial sensation intact/symmetric. Facial movements symmetric. Hearing not formally tested but intact to conversation. Palate elevation symmetric, uvula midline. No dysarthria. Shoulder shrug strong bilaterally. Tongue protrusion midline.     Motor: No tremors or other abnormal movements observed. Muscle tone normal slightly increased in biceps and upper extremities (Seems more like paratonia that spasticity or rigidity). No pronator drift. Normal/symmetric rapid finger tapping (no bradykinesia with these movements as well as with foot tapping exercises/tasks).   Motor:     Right Left   Arm abduction 5/5 5/5   Elbow Flex 5/5 5/5   Elbow Extension 5/5 5/5   Wrist Extension 5/5 5/5   FDI  5/5 5/5   APB 5/5 5/5     5/5 5/5   Hip FLexion 4/5 4/5   Knee Flexion 5/5 5/5   Knee Extension 5/5 5/5   Dorsiflexion  5/5 5/5        Deep Tendon Reflexes: 2+/symmetric throughout upper extremities, and 1+ on patellar with absent achilles. No clonus. Toes downgoing bilaterally.     Sensory: Diminished light touch and pinprick in toes but not necessarily at ankles. Vibration reduced through toes, ankles, but normal (15+ seconds) at mid leg and knees.  Some errors in reporting proprioception of great toes b/l.  Sway with Romberg.      Coordination: Finger-nose-finger  intact without dysmetria. Rapid alternating movements intact/symmetric with normal speed and rhythm.     Gait: stands up slowly but without need of extra assistance with arms across chest. Normal width stance. Starts and stops easily with walking.  Good arm swing. Slightly flexed at hips when walking, leaning forward.  No toe drag or circumduction of legs.  Pull back x3 was negative.          Data: Pertinent prior to visit   Imaging:  MRI brain:  "7/10/2024:  - Normal upon review.  There was some arachnoid granulation of left parietal bone, and some mild findings of T2 hyperintensities of cerebral white matter consistent with microvascular ischemic changes (normal for age).    MRI lumbar spine: 7/10/2024:  MPRESSION:  1. Counting down from the C2 vertebral body, there is lumbarization of the S1 vertebral body.  2. Multilevel lumbar spondylosis, most pronounced with severe spinal canal stenosis at L5-S1, and moderate bilateral neuroforaminal stenosis at L4-L5 with likely abutment of the exiting bilateral L4 nerves.   ~ fatty atrophy noted in Paraspinous musculature.  Ligamentum flavum hypertrophy noted at L2-S1.         Assessment and Plan:   Assessment:  Spinal stenosis w/possible multiple level radiculopathy (L4 predominant)    The patient reports odd symptoms of legs giving way in his youth, said to be \"a touch of polio\" from what he recalls.  He has mild changes at best in terms of strength of his HF, but this may be more decondition given the lack of affect it has on gait or that it is present in terms of isolation. Given the spinal stenosis in the lumbar region, it is possible it is impacting his gait, but I would expect more of a painful presentation if this was the entire case.  While I didn't notice L4 related sensory change or weakness, I do think imaging and his history give weight towards obtaining an EMG to look for potential etiologies that are mild but impactful on his walking.  He does have some mild sensory changes, so I would also ask him to to obtain neuropathy related labs.    Plan:  - EMG b/l lower extremities  - B12, SPEP, Immunofixation, vitamin E, TSH, CK, ESR, CRP    Follow up in Neurology clinic in 3-5 months, or should new concerns arise.    ILIR Law D.O.   of Neurology    Total time today (75 min) in this patient encounter was spent on pre-charting, counseling and/or coordination of care.  The patient is " in agreement with this plan and has no further questions. The longitudinal plan of care for the diagnosis(es)/condition(s) as documented were addressed during this visit. Due to the added complexity in care, I will continue to support Jonathon in the subsequent management and with ongoing continuity of care.

## 2024-11-08 NOTE — PATIENT INSTRUCTIONS
I think your back related changes may be causing nerve injury at the roots, and that this may be helped in terms of diagnosis by means of a test called an EMG.   - EMG both lower legs  - Serum studies: B12, SPEP, Immunofixation, vitamin E, TSH, CK, ESR, CRP

## 2024-11-11 ENCOUNTER — LAB (OUTPATIENT)
Dept: LAB | Facility: CLINIC | Age: 79
End: 2024-11-11
Payer: COMMERCIAL

## 2024-11-11 DIAGNOSIS — M54.32 SCIATICA WITHOUT BACK PAIN, LEFT: ICD-10-CM

## 2024-11-11 DIAGNOSIS — M48.061 SPINAL STENOSIS OF LUMBAR REGION, UNSPECIFIED WHETHER NEUROGENIC CLAUDICATION PRESENT: ICD-10-CM

## 2024-11-11 DIAGNOSIS — R93.89 ABNORMAL FINDINGS ON DIAGNOSTIC IMAGING OF OTHER SPECIFIED BODY STRUCTURES: ICD-10-CM

## 2024-11-11 LAB
CK SERPL-CCNC: 62 U/L (ref 39–308)
CRP SERPL-MCNC: <3 MG/L
ERYTHROCYTE [SEDIMENTATION RATE] IN BLOOD BY WESTERGREN METHOD: 22 MM/HR (ref 0–20)
TOTAL PROTEIN SERUM FOR ELP: 7.4 G/DL (ref 6.4–8.3)
TSH SERPL DL<=0.005 MIU/L-ACNC: 3.1 UIU/ML (ref 0.3–4.2)

## 2024-11-11 PROCEDURE — 84155 ASSAY OF PROTEIN SERUM: CPT | Performed by: PATHOLOGY

## 2024-11-11 PROCEDURE — 84165 PROTEIN E-PHORESIS SERUM: CPT | Mod: TC | Performed by: STUDENT IN AN ORGANIZED HEALTH CARE EDUCATION/TRAINING PROGRAM

## 2024-11-11 PROCEDURE — 82607 VITAMIN B-12: CPT | Performed by: PSYCHIATRY & NEUROLOGY

## 2024-11-11 PROCEDURE — 86334 IMMUNOFIX E-PHORESIS SERUM: CPT | Mod: 26 | Performed by: STUDENT IN AN ORGANIZED HEALTH CARE EDUCATION/TRAINING PROGRAM

## 2024-11-11 PROCEDURE — 84443 ASSAY THYROID STIM HORMONE: CPT | Performed by: PATHOLOGY

## 2024-11-11 PROCEDURE — 86140 C-REACTIVE PROTEIN: CPT | Performed by: PATHOLOGY

## 2024-11-11 PROCEDURE — 82550 ASSAY OF CK (CPK): CPT | Performed by: PATHOLOGY

## 2024-11-11 PROCEDURE — 84446 ASSAY OF VITAMIN E: CPT | Mod: 90 | Performed by: PATHOLOGY

## 2024-11-11 PROCEDURE — 36415 COLL VENOUS BLD VENIPUNCTURE: CPT | Performed by: PATHOLOGY

## 2024-11-11 PROCEDURE — 86334 IMMUNOFIX E-PHORESIS SERUM: CPT | Performed by: STUDENT IN AN ORGANIZED HEALTH CARE EDUCATION/TRAINING PROGRAM

## 2024-11-11 PROCEDURE — 84165 PROTEIN E-PHORESIS SERUM: CPT | Mod: 26 | Performed by: STUDENT IN AN ORGANIZED HEALTH CARE EDUCATION/TRAINING PROGRAM

## 2024-11-11 PROCEDURE — 85652 RBC SED RATE AUTOMATED: CPT | Performed by: PATHOLOGY

## 2024-11-11 PROCEDURE — 99000 SPECIMEN HANDLING OFFICE-LAB: CPT | Performed by: PATHOLOGY

## 2024-11-12 ENCOUNTER — TELEPHONE (OUTPATIENT)
Dept: INTERNAL MEDICINE | Facility: CLINIC | Age: 79
End: 2024-11-12
Payer: COMMERCIAL

## 2024-11-12 LAB
ALBUMIN SERPL ELPH-MCNC: 4.4 G/DL (ref 3.7–5.1)
ALPHA1 GLOB SERPL ELPH-MCNC: 0.3 G/DL (ref 0.2–0.4)
ALPHA2 GLOB SERPL ELPH-MCNC: 0.7 G/DL (ref 0.5–0.9)
B-GLOBULIN SERPL ELPH-MCNC: 0.9 G/DL (ref 0.6–1)
GAMMA GLOB SERPL ELPH-MCNC: 1 G/DL (ref 0.7–1.6)
LOCATION OF TASK: NORMAL
LOCATION OF TASK: NORMAL
M PROTEIN SERPL ELPH-MCNC: 0 G/DL
PROT PATTERN SERPL ELPH-IMP: NORMAL
PROT PATTERN SERPL IFE-IMP: NORMAL
VIT B12 SERPL-MCNC: 681 PG/ML (ref 232–1245)

## 2024-11-12 NOTE — TELEPHONE ENCOUNTER
Patient confirmed scheduled appointment:  Date: 12/26/2024  Time: 900am  Visit type: In Person  Provider: Dr. Walker  Location: [address] [place] [address]  Testing/imaging: -  Additional notes: Rescheduled from 12/30/24 due to provider out of office per patient.

## 2024-11-13 LAB
A-TOCOPHEROL VIT E SERPL-MCNC: 12.5 MG/L
BETA+GAMMA TOCOPHEROL SERPL-MCNC: 0.2 MG/L

## 2024-12-03 NOTE — PROGRESS NOTES
Reason for Visit:  Today I have seen Jonathon Guerra for HLD, CAD  Consult: No    HPI : Status / Symptoms / Concerns     78 y/o m with HLD, pAF (), nonobstructive CAD (coronary CT ). He is trying to remain active.  In the last year and he took care of his sister which prevented him to be as active as he was in the past.  Considerable weight gain.  No episodes of atrial fibrillation, both by symptoms and monitor.  Home SBP typically in the 130s.     Cardiovascular risk factor profile:   (+) HTN, (-) DM, (+) hypercholesterolemia, (+)  prior tobacco use, (-) fam Hx premature CAD.     Chest Pain:   No  Shortness of Breath:  No  Ankle Swelling:  No  Muscle Aches:  No  Palpitations:   No    Lightheadedness:  No  Fainting:   No  Medication Issues:  No  Recent Test:  No    Past Medical History:   Diagnosis Date    Atrial fibrillation (H)     Coronary artery disease     Hyperlipidemia     Hypertension     Thyroid disease       Past Surgical History:   Procedure Laterality Date    HERNIA REPAIR, INGUINAL RT/LT      OTHER SURGICAL HISTORY      upper and lower partial plate        Other Systems:  Resp - / GI - /MS - /Chapis - /Psy - /Derm - /Hem - / - /Lymph - /ENT -/ Endo -  No other pertinent concern in systems review.     Social History: reports that he quit smoking about 32 years ago. His smoking use included cigarettes. He started smoking about 58 years ago. He has never used smokeless tobacco. He reports that he does not drink alcohol and does not use drugs.   I have reviewed this patient's social history and updated it with pertinent information if needed.    Family History:  He indicated that the status of his no family hx of is unknown. He indicated that his mother is . He indicated that his father is . He indicated that only one of his two sisters is alive. He indicated that four of his six brothers are alive.     Family History   Problem Relation Age of Onset    Other - See Comments Mother          old age    Cancer Father         prostate    Cancer Brother     Diabetes No family hx of     Glaucoma No family hx of     Macular Degeneration No family hx of     Hypertension No family hx of     Melanoma No family hx of     Skin Cancer No family hx of        Medications:  Current Outpatient Medications   Medication Sig Dispense Refill    Acetaminophen 650 MG TABS Take by mouth daily      apixaban ANTICOAGULANT (ELIQUIS) 5 MG tablet Take 1 tablet (5 mg) by mouth 2 times daily 180 tablet 3    cholecalciferol (VITAMIN D-1000 MAX ST) 25 MCG (1000 UT) TABS Take 2 tablets by mouth daily 180 tablet 3    Coenzyme Q10-Vitamin E (QUNOL ULTRA COQ10 PO)       FLUZONE HIGH-DOSE QUADRIVALENT 0.7 ML LAVON injection Pt had flu shot 8/25      ketoconazole (NIZORAL) 2 % external cream Apply topically daily As needed to affected areas of rash or scale 60 g 11    ketoconazole (NIZORAL) 2 % external shampoo Apply topically daily as needed for itching or irritation 120 mL 11    levothyroxine (SYNTHROID/LEVOTHROID) 88 MCG tablet Take 1 tablet (88 mcg) by mouth daily 90 tablet 2    Multiple Vitamin (MULTI-VITAMIN PO) Take by mouth daily       rosuvastatin (CRESTOR) 40 MG tablet Take 1 tablet (40 mg) by mouth daily 90 tablet 3    saw palmetto 450 MG CAPS capsule       sotalol (BETAPACE) 80 MG tablet Take 1 tablet (80 mg) by mouth 2 times daily No further refills by provider. Needs to establish care with a general cardiologist 120 tablet 0    triamcinolone (KENALOG) 0.1 % external cream APPLY TO AFFECTED AREA on scalp and lower legs as needed twice daily until rashes clear 45 g 1    vitamin B-Complex Take 1 tablet by mouth daily      zinc gluconate 50 MG tablet Take 1 tablet (50 mg) by mouth daily      Diphenhydramine-APAP, sleep, (ACETAMINOPHEN PM PO) Take 500 mg by mouth      ketoconazole (NIZORAL) 2 % external cream Apply to the feet and inbetween the toes daily x 1 month then as needed. 60 g 3    Melatonin 10 MG TABS tablet Take  10 mg by mouth       No current facility-administered medications for this visit.        Exam:  /85 (BP Location: Right arm, Patient Position: Chair, Cuff Size: Adult Regular)   Pulse 66   Wt 100.7 kg (221 lb 14.4 oz)   SpO2 95%   BMI 33.25 kg/m     Body mass index is 33.25 kg/m .   General:  Alert, oriented, no acute distress  Eyes:  External exam normal, Conjunctivae noninjected and nonicteric.  Mouth:  Moist mucosa, restored teeth  Ears:  Hearing grossly intact  Neck:  No thyromegaly. Carotid upstroke normal, no carotid bruit, no JVD  Lungs:  Clear to auscultation. No wheezes, crackles, rales or rhonchi,      no accessory muscle use   Heart:  Regular, normal S1 and S2, no obvious murmurs, no rubs or gallops  Abdomen: Soft, non-tender  Extremities: No ankle edema, age appropriate skin without stasis  Chapis/Psy: Non-focal, normal mood, normal affect      Vital Trend:  Wt Readings from Last 5 Encounters:   12/04/24 100.7 kg (221 lb 14.4 oz)   07/22/24 98.9 kg (218 lb)   06/07/24 97.3 kg (214 lb 9.6 oz)   12/20/23 95 kg (209 lb 8 oz)   12/06/23 96 kg (211 lb 11.2 oz)     BP Readings from Last 5 Encounters:   12/04/24 133/85   11/08/24 (!) 143/79   06/07/24 126/83   12/20/23 134/87   12/06/23 130/83     Pulse Readings from Last 5 Encounters:   12/04/24 66   11/08/24 67   06/07/24 73   12/20/23 70   12/06/23 66        Data:     ECG (2023):   Sinus rhythm, borderline bradycardia    Echocardiogram (2023):  Left ventricular size, wall motion and function are normal. The ejection  fraction is 55-60%. Right ventricular function, chamber size, wall motion, and thickness are  normal. Mild (pulmonary artery systolic pressure<50mmHg) pulmonary hypertension is  present. Right ventricular systolic pressure is 33mmHg above the right atrial  pressure.     Mild dilatation of the aorta is present. Ascending aorta 4.1 cm.  Compared to prior baseline imaging on 10/30/2018 there are no signifcant effects.     US aortic imaging  (2019)  No abdominal aortic aneurysm demonstrated.    Lab Review:  Lab Results   Component Value Date    CR 1.00 06/07/2024    CR 0.96 04/10/2023    CR 0.77 04/11/2022    CR 0.96 01/12/2021    CR 0.90 10/30/2020    LDL 49 06/07/2024    LDL 42 04/10/2023    LDL 33 04/11/2022    HDL 48 06/07/2024    HDL 56 04/10/2023    HDL 57 04/11/2022    NTBNPI 1440 (H) 04/10/2009    POTASSIUM 4.5 06/07/2024    POTASSIUM 4.8 04/10/2023    POTASSIUM 4.1 04/11/2022    POTASSIUM 4.0 01/12/2021    POTASSIUM 4.4 10/30/2020     06/07/2024     04/10/2023     04/11/2022     01/12/2021     10/30/2020       Assessment:     Jonathon Guerra is a 79 year old male with asymptomatic CAD, HLD, paroxysmal AF mildly dilated asc.Aa.   Increasing blood pressure with weight gain.  To counteract this we will start losartan at a dose of 100 mg.  In regards to the atrial fibrillation we had a discussion about the side effect profile of sotalol and the risks and benefits.  Once he is back from a vacation he may hold it.  If he should have a recurrence of atrial fibrillation we can use sotalol for rate control as he is anticoagulated.  Other medication choices should be considered with a more benign side effect profile.  Today's EKG did not reveal any significant QT prolongation from the  sotalol.    Plan:     1. CAD/ HTN   - no symptoms   - Crestor 40   - START losartan 100 mg     2. AF   - Apixaban   - Hold sotalol in January   - Caloric restriction and daily walks     3. Mildly dilated asc.Ao   - observation     Contingency Plan: Dronedarone 400 twice daily  Follow-up: 1 year    I spent 40min for the chart preparation, visit and documentation   This note was software transcribed.

## 2024-12-04 ENCOUNTER — OFFICE VISIT (OUTPATIENT)
Dept: CARDIOLOGY | Facility: CLINIC | Age: 79
End: 2024-12-04
Attending: INTERNAL MEDICINE
Payer: COMMERCIAL

## 2024-12-04 VITALS
OXYGEN SATURATION: 95 % | SYSTOLIC BLOOD PRESSURE: 133 MMHG | WEIGHT: 221.9 LBS | HEART RATE: 66 BPM | DIASTOLIC BLOOD PRESSURE: 85 MMHG | BODY MASS INDEX: 33.25 KG/M2

## 2024-12-04 DIAGNOSIS — I48.20 CHRONIC ATRIAL FIBRILLATION (H): ICD-10-CM

## 2024-12-04 DIAGNOSIS — I25.10 CORONARY ARTERY DISEASE INVOLVING NATIVE CORONARY ARTERY OF NATIVE HEART WITHOUT ANGINA PECTORIS: ICD-10-CM

## 2024-12-04 DIAGNOSIS — I15.9 SECONDARY HYPERTENSION: Primary | ICD-10-CM

## 2024-12-04 PROCEDURE — 93005 ELECTROCARDIOGRAM TRACING: CPT

## 2024-12-04 PROCEDURE — G0463 HOSPITAL OUTPT CLINIC VISIT: HCPCS | Performed by: INTERNAL MEDICINE

## 2024-12-04 RX ORDER — LOSARTAN POTASSIUM 100 MG/1
100 TABLET ORAL DAILY
Qty: 90 TABLET | Refills: 11 | Status: SHIPPED | OUTPATIENT
Start: 2024-12-04

## 2024-12-04 ASSESSMENT — PAIN SCALES - GENERAL: PAINLEVEL_OUTOF10: NO PAIN (0)

## 2024-12-04 NOTE — LETTER
12/4/2024      RE: Jonathon Guerra  [address]   [facility] [address]       Dear Colleague,    Thank you for the opportunity to participate in the care of your patient, Jonathon Guerra, at the [facility] [place] at [facility]. Please see a copy of my visit note below.    Reason for Visit:  Today I have seen Jonathon Guerra for HLD, CAD  Consult: No    HPI : Status / Symptoms / Concerns     78 y/o m with HLD, pAF (2006), nonobstructive CAD (coronary CT 2006). He is trying to remain active.  In the last year and he took care of his sister which prevented him to be as active as he was in the past.  Considerable weight gain.  No episodes of atrial fibrillation, both by symptoms and monitor.  Home SBP typically in the 130s.     Cardiovascular risk factor profile:   (+) HTN, (-) DM, (+) hypercholesterolemia, (+)  prior tobacco use, (-) fam Hx premature CAD.     Chest Pain:   No  Shortness of Breath:  No  Ankle Swelling:  No  Muscle Aches:  No  Palpitations:   No    Lightheadedness:  No  Fainting:   No  Medication Issues:  No  Recent Test:  No    Past Medical History:   Diagnosis Date     Atrial fibrillation (H)      Coronary artery disease      Hyperlipidemia      Hypertension      Thyroid disease       Past Surgical History:   Procedure Laterality Date     HERNIA REPAIR, INGUINAL RT/LT       OTHER SURGICAL HISTORY      upper and lower partial plate        Other Systems:  Resp - / GI - /MS - /Chapis - /Psy - /Derm - /Hem - / - /Lymph - /ENT -/ Endo -  No other pertinent concern in systems review.     Social History: reports that he quit smoking about 32 years ago. His smoking use included cigarettes. He started smoking about 58 years ago. He has never used smokeless tobacco. He reports that he does not drink alcohol and does not use drugs.   I have reviewed this patient's social history and updated it with pertinent information if  needed.    Family History:  He indicated that the status of his no family hx of is unknown. He indicated that his mother is . He indicated that his father is . He indicated that only one of his two sisters is alive. He indicated that four of his six brothers are alive.     Family History   Problem Relation Age of Onset     Other - See Comments Mother         old age     Cancer Father         prostate     Cancer Brother      Diabetes No family hx of      Glaucoma No family hx of      Macular Degeneration No family hx of      Hypertension No family hx of      Melanoma No family hx of      Skin Cancer No family hx of        Medications:  Current Outpatient Medications   Medication Sig Dispense Refill     Acetaminophen 650 MG TABS Take by mouth daily       apixaban ANTICOAGULANT (ELIQUIS) 5 MG tablet Take 1 tablet (5 mg) by mouth 2 times daily 180 tablet 3     cholecalciferol (VITAMIN D-1000 MAX ST) 25 MCG (1000 UT) TABS Take 2 tablets by mouth daily 180 tablet 3     Coenzyme Q10-Vitamin E (QUNOL ULTRA COQ10 PO)        FLUZONE HIGH-DOSE QUADRIVALENT 0.7 ML LAVON injection Pt had flu shot        ketoconazole (NIZORAL) 2 % external cream Apply topically daily As needed to affected areas of rash or scale 60 g 11     ketoconazole (NIZORAL) 2 % external shampoo Apply topically daily as needed for itching or irritation 120 mL 11     levothyroxine (SYNTHROID/LEVOTHROID) 88 MCG tablet Take 1 tablet (88 mcg) by mouth daily 90 tablet 2     Multiple Vitamin (MULTI-VITAMIN PO) Take by mouth daily        rosuvastatin (CRESTOR) 40 MG tablet Take 1 tablet (40 mg) by mouth daily 90 tablet 3     saw palmetto 450 MG CAPS capsule        sotalol (BETAPACE) 80 MG tablet Take 1 tablet (80 mg) by mouth 2 times daily No further refills by provider. Needs to establish care with a general cardiologist 120 tablet 0     triamcinolone (KENALOG) 0.1 % external cream APPLY TO AFFECTED AREA on scalp and lower legs as needed twice  daily until rashes clear 45 g 1     vitamin B-Complex Take 1 tablet by mouth daily       zinc gluconate 50 MG tablet Take 1 tablet (50 mg) by mouth daily       Diphenhydramine-APAP, sleep, (ACETAMINOPHEN PM PO) Take 500 mg by mouth       ketoconazole (NIZORAL) 2 % external cream Apply to the feet and inbetween the toes daily x 1 month then as needed. 60 g 3     Melatonin 10 MG TABS tablet Take 10 mg by mouth       No current facility-administered medications for this visit.        Exam:  /85 (BP Location: Right arm, Patient Position: Chair, Cuff Size: Adult Regular)   Pulse 66   Wt 100.7 kg (221 lb 14.4 oz)   SpO2 95%   BMI 33.25 kg/m     Body mass index is 33.25 kg/m .   General:  Alert, oriented, no acute distress  Eyes:  External exam normal, Conjunctivae noninjected and nonicteric.  Mouth:  Moist mucosa, restored teeth  Ears:  Hearing grossly intact  Neck:  No thyromegaly. Carotid upstroke normal, no carotid bruit, no JVD  Lungs:  Clear to auscultation. No wheezes, crackles, rales or rhonchi,      no accessory muscle use   Heart:  Regular, normal S1 and S2, no obvious murmurs, no rubs or gallops  Abdomen: Soft, non-tender  Extremities: No ankle edema, age appropriate skin without stasis  Chapis/Psy: Non-focal, normal mood, normal affect      Vital Trend:  Wt Readings from Last 5 Encounters:   12/04/24 100.7 kg (221 lb 14.4 oz)   07/22/24 98.9 kg (218 lb)   06/07/24 97.3 kg (214 lb 9.6 oz)   12/20/23 95 kg (209 lb 8 oz)   12/06/23 96 kg (211 lb 11.2 oz)     BP Readings from Last 5 Encounters:   12/04/24 133/85   11/08/24 (!) 143/79   06/07/24 126/83   12/20/23 134/87   12/06/23 130/83     Pulse Readings from Last 5 Encounters:   12/04/24 66   11/08/24 67   06/07/24 73   12/20/23 70   12/06/23 66        Data:     ECG (2023):   Sinus rhythm, borderline bradycardia    Echocardiogram (2023):  Left ventricular size, wall motion and function are normal. The ejection  fraction is 55-60%. Right ventricular  function, chamber size, wall motion, and thickness are  normal. Mild (pulmonary artery systolic pressure<50mmHg) pulmonary hypertension is  present. Right ventricular systolic pressure is 33mmHg above the right atrial  pressure.     Mild dilatation of the aorta is present. Ascending aorta 4.1 cm.  Compared to prior baseline imaging on 10/30/2018 there are no signifcant effects.     US aortic imaging (2019)  No abdominal aortic aneurysm demonstrated.    Lab Review:  Lab Results   Component Value Date    CR 1.00 06/07/2024    CR 0.96 04/10/2023    CR 0.77 04/11/2022    CR 0.96 01/12/2021    CR 0.90 10/30/2020    LDL 49 06/07/2024    LDL 42 04/10/2023    LDL 33 04/11/2022    HDL 48 06/07/2024    HDL 56 04/10/2023    HDL 57 04/11/2022    NTBNPI 1440 (H) 04/10/2009    POTASSIUM 4.5 06/07/2024    POTASSIUM 4.8 04/10/2023    POTASSIUM 4.1 04/11/2022    POTASSIUM 4.0 01/12/2021    POTASSIUM 4.4 10/30/2020     06/07/2024     04/10/2023     04/11/2022     01/12/2021     10/30/2020       Assessment:     Jonathon Guerra is a 79 year old male with asymptomatic CAD, HLD, paroxysmal AF mildly dilated asc.Aa.   Increasing blood pressure with weight gain.  To counteract this we will start losartan at a dose of 100 mg.  In regards to the atrial fibrillation we had a discussion about the side effect profile of sotalol and the risks and benefits.  Once he is back from a vacation he may hold it.  If he should have a recurrence of atrial fibrillation we can use sotalol for rate control as he is anticoagulated.  Other medication choices should be considered with a more benign side effect profile.  Today's EKG did not reveal any significant QT prolongation from the  sotalol.    Plan:     1. CAD/ HTN   - no symptoms   - Crestor 40   - START losartan 100 mg     2. AF   - Apixaban   - Hold sotalol in January   - Caloric restriction and daily walks     3. Mildly dilated asc.Ao   - observation     Contingency  Plan: Dronedarone 400 twice daily  Follow-up: 1 year    I spent 40min for the chart preparation, visit and documentation   This note was software transcribed.        Please do not hesitate to contact me if you have any questions/concerns.     Sincerely,     Cheng Shane MD

## 2024-12-04 NOTE — PATIENT INSTRUCTIONS
Dr. Shane recommends:    Start taking Losartan 100 MG once daily.    EKG today in clinic.    Follow up clinic visit with Dr. Shane in one year. No labs needed.    Thank you for your visit today.  Please call me with any questions or concerns.   Jacky Chicas RN  Cardiology Care Coordinator  [phone]

## 2024-12-04 NOTE — NURSING NOTE
Chief Complaint   Patient presents with    Follow Up      Annual F/U per pt Coronary artery disease involving native coronary artery of native heart without No Device [facility]         Vitals were taken and medications reconciled.    Johnnie Anguiano, EMT  10:08 AM

## 2024-12-05 LAB
ATRIAL RATE - MUSE: 59 BPM
DIASTOLIC BLOOD PRESSURE - MUSE: NORMAL MMHG
INTERPRETATION ECG - MUSE: NORMAL
P AXIS - MUSE: 5 DEGREES
PR INTERVAL - MUSE: 160 MS
QRS DURATION - MUSE: 80 MS
QT - MUSE: 444 MS
QTC - MUSE: 439 MS
R AXIS - MUSE: -5 DEGREES
SYSTOLIC BLOOD PRESSURE - MUSE: NORMAL MMHG
T AXIS - MUSE: -4 DEGREES
VENTRICULAR RATE- MUSE: 59 BPM

## 2024-12-16 ENCOUNTER — MYC MEDICAL ADVICE (OUTPATIENT)
Dept: CARDIOLOGY | Facility: CLINIC | Age: 79
End: 2024-12-16
Payer: COMMERCIAL

## 2024-12-25 NOTE — PROGRESS NOTES
HPI;    Mr. Guerra comes in for follow up today. Overall doing well. He is now off Sotalol. He states when he exercises, his heart rate goes above 100. He does not seem to have significant sxs. With this increased HR. Otherwise, no additional HEENT, cardiopulmonary, abdominal, , neurological, systemic, psychiatric, lymphatic, endocrine, vascular complaints. He continues to get some exercise and enjoys this.       Past Medical History:   Diagnosis Date    Atrial fibrillation (H)     Coronary artery disease     Hyperlipidemia     Hypertension     Thyroid disease      Past Surgical History:   Procedure Laterality Date    HERNIA REPAIR, INGUINAL RT/LT      OTHER SURGICAL HISTORY      upper and lower partial plate           PE:    Vitals noted, gen, nad, cooperative, alert, neck supple nl rom, lungs with good air movement, RRR, S1, S2,  no MRG, abdomen, no acute findings, Grossly normal neurological exam.     Recent Results (from the past 720 hours)   EKG 12-lead, tracing only (Same Day)    Collection Time: 12/04/24 10:50 AM   Result Value Ref Range    Systolic Blood Pressure  mmHg    Diastolic Blood Pressure  mmHg    Ventricular Rate 59 BPM    Atrial Rate 59 BPM    ID Interval 160 ms    QRS Duration 80 ms     ms    QTc 439 ms    P Axis 5 degrees    R AXIS -5 degrees    T Axis -4 degrees    Interpretation ECG       Sinus bradycardia  T-wave abnormality  Abnormal ECG  When compared with ECG of 11-Oct-2022 08:42,  No significant change was found  Confirmed by fellow Kirk Montelongo ([id number]) on 12/5/2024 3:45:53 PM  Confirmed by MD LESLY, YANI ([id number]) on 12/5/2024 6:35:14 PM         A/P:     1.  Immunizations; Moderna COVID vaccine x 6, Pfizer COVID x 1.  Prevnar 13. done 8/2/2017, Tdap 10/28/2014. He has completed s Shinrix Zoster vaccine series.   Pneumococcal 23 done 10/28/2014. Prevnar 20 vaccine done 10/1/2022. RSV vaccine done 9/26/2023.   2. On Eliquis for afib and off both  Metoprolol and Sotalol .  Cardiology visit 10/28/2021 with Ms. Velasquez. He saw Ms. Daley 10/11/2022. Resting echo 4/27/2023 and he had a cardiology appt. With Dr. Shane 12/4/2024. Ordered 7 day Ziopatch monitor today   3. On thyroid replacement; TSH on 3.10 on 11/11/2024.   4. Increased lipids on Rosuvastatin. Lipid panel 6/7/2024; TG's 118, LDL 49 and HDL 48  5. PSA 3.84 on 6/7/2024.    6. Dermatology skin check 10/30/2024 with  Dr. English. Next appt. 10/29/2025 with  Dr. English.   7. Colonoscopy 7/1/2020.   8. Repeat CBC 6/7/2024 normal Hgb and platelets.   9. He had a normal abdominal ultrasound 12/20/2023  10. Family h/o lung cancer and he has a smoking history. He gets Chest CT scan at [facility]  11. He had an EMG for balance and walking issues 11/27/2024. He was seen in Neurology Dr. Law 11/8/2024 and next 2/4/2025. He had a lumbar spine MRI 7/10/2024.      40 minutes spent on the date of the encounter doing chart review, history and exam, documentation and further activities as noted above

## 2024-12-26 ENCOUNTER — OFFICE VISIT (OUTPATIENT)
Dept: INTERNAL MEDICINE | Facility: CLINIC | Age: 79
End: 2024-12-26
Payer: COMMERCIAL

## 2024-12-26 VITALS
SYSTOLIC BLOOD PRESSURE: 131 MMHG | OXYGEN SATURATION: 94 % | DIASTOLIC BLOOD PRESSURE: 87 MMHG | BODY MASS INDEX: 34.13 KG/M2 | HEART RATE: 79 BPM | WEIGHT: 227.8 LBS

## 2024-12-26 DIAGNOSIS — I48.91 ATRIAL FIBRILLATION, UNSPECIFIED TYPE (H): Primary | ICD-10-CM

## 2024-12-26 NOTE — PROGRESS NOTES
Jonathon Guerra is a 79 year old male that presents in clinic today for the following:     Chief Complaint   Patient presents with    Follow Up     Previous conditions     Musculoskeletal Problem     Walking issue and back pain has improved     The patient's allergies and medications were reviewed. The patient's vitals were obtained without incident.     Patient Jonathon Guerra arrived here on 12/26/2024 at 9:43 AM Zio monitor placement for 7 days per ordering provider Dr. Walker.  Patient's skin was prepped per protocol. Zio monitor was placed. Instructions were reviewed with and given to the patient.  Patient verbalized understanding of wear, troubleshooting, and monitor return instructions.     [place]   Primary Care Clinic

## 2025-01-06 LAB — CV ZIO PRELIM RESULTS: NORMAL

## 2025-01-16 LAB — CV ZIO PRELIM RESULTS: NORMAL

## 2025-02-04 ENCOUNTER — VIRTUAL VISIT (OUTPATIENT)
Dept: NEUROLOGY | Facility: CLINIC | Age: 80
End: 2025-02-04
Payer: COMMERCIAL

## 2025-02-04 DIAGNOSIS — M48.061 SPINAL STENOSIS OF LUMBAR REGION, UNSPECIFIED WHETHER NEUROGENIC CLAUDICATION PRESENT: Primary | ICD-10-CM

## 2025-02-04 NOTE — PROGRESS NOTES
Virtual Visit Details    Type of service:  Video Visit   Video Start Time:  0958  Video End Time:10:19 AM    Originating Location (pt. Location): Home    Distant Location (provider location):  On-site  Platform used for Video Visit: [employer]

## 2025-02-04 NOTE — NURSING NOTE
Current patient location: [address] [place] [address]   [facility] [address]    Is the patient currently in the state of [place]? YES    Visit mode: VIDEO    If the visit is dropped, the patient can be reconnected by:TELEPHONE VISIT: Phone number:   Telephone Information:   Mobile [phone]       Will anyone else be joining the visit? NO  (If patient encounters technical issues they should call [phone])    Are changes needed to the allergy or medication list? No    Are refills needed on medications prescribed by this physician? NO    Rooming Documentation:  Questionnaire(s) completed    Reason for visit: IZABEL MARQUESF

## 2025-02-04 NOTE — PROGRESS NOTES
[facility] Neurology Follow Up Visit    Jonathon Guerra MRN# [id number]   Age: 79 year old YOB: 1945     Brief history of symptoms: The patient was initially seen in neurologic consultation on 11/8/2024 for evaluation of difficulty walking. Please see the comprehensive neurologic consultation notes from those dates in the Epic records for details.     Interval history:   - Serum studies 11/11/2024 were normal (ESR (mildly high at 22 when normal is 0-20), CRP, CK, TSH, Vitamin E, SPEP, Immunofixation, B12)   - EMG on 11/27/2024 showed a possible mild b/l lumbosacral radiculopathy (non-localizable and non-diagnostic), as well as some support for prior poliomyelitis.    Today, the patient has attended PT and feels exercises were helpful to strengthen his core and buttocks.  He feels he did let his general conditioning slide for a few months.  Now, he notices that his leaning forward isn't as bad as it was in the past.  He feels his walking isn't impacted any further. He walks daily and has no issues with doing 5-6 miles at a time. He has no pain in his back.       Physical Exam:   General: Seated comfortably in no acute distress.  Neurologic:     Mental Status: Fully alert, attentive and oriented. Speech clear and fluent, no paraphasic errors.     Cranial Nerves: EOMI with normal smooth pursuit. Facial movements symmetric. Hearing not formally tested but intact to conversation.  No dysarthria.     Motor: No tremors or other abnormal movements observed.      Coordination: Finger-nose-finger without dysmetria.         Assessment and Plan:   Assessment:  Likely Hx of Poliomyelitis  Likely recurrent lumbar radiculopathy, non-specific and not active    The patient's general conditioning with PT has led to major improvement in his waking and fatigability. At this time, he doesn't have pain or weakness to report that would merit further imaging or treatment (such as MAYNOR, or surgical intervention).  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  We discussed that he may be prone to an acute lumbar radiculopathy if a fall or trauma occurs, and that he could contact me at any time if new symptoms develop. Otherwise, he was to follow up with his PCP moving forward.     Plan:  Follow up in Neurology clinic  as needed should new concerns arise.    ILIR Law D.O.   of Neurology    Total time today (30 min) in this patient encounter was spent on pre-charting, counseling and/or coordination of care.

## 2025-02-04 NOTE — LETTER
2/4/2025       RE: Jonathon Guerra  [address]   [facility] [address]     Dear Colleague,    Thank you for referring your patient, Jonathon Guerra, to the [facility] NEUROLOGY CLIN[facility] [place] at [facility]. Please see a copy of my visit note below.    [facility] Neurology Follow Up Visit    Jonathon Guerra MRN# [id number]   Age: 79 year old YOB: 1945     Brief history of symptoms: The patient was initially seen in neurologic consultation on 11/8/2024 for evaluation of difficulty walking. Please see the comprehensive neurologic consultation notes from those dates in the Epic records for details.     Interval history:   - Serum studies 11/11/2024 were normal (ESR (mildly high at 22 when normal is 0-20), CRP, CK, TSH, Vitamin E, SPEP, Immunofixation, B12)   - EMG on 11/27/2024 showed a possible mild b/l lumbosacral radiculopathy (non-localizable and non-diagnostic), as well as some support for prior poliomyelitis.    Today, the patient has attended PT and feels exercises were helpful to strengthen his core and buttocks.  He feels he did let his general conditioning slide for a few months.  Now, he notices that his leaning forward isn't as bad as it was in the past.  He feels his walking isn't impacted any further. He walks daily and has no issues with doing 5-6 miles at a time. He has no pain in his back.       Physical Exam:   General: Seated comfortably in no acute distress.  Neurologic:     Mental Status: Fully alert, attentive and oriented. Speech clear and fluent, no paraphasic errors.     Cranial Nerves: EOMI with normal smooth pursuit. Facial movements symmetric. Hearing not formally tested but intact to conversation.  No dysarthria.     Motor: No tremors or other abnormal movements observed.      Coordination: Finger-nose-finger without dysmetria.         Assessment and Plan:   Assessment:  Likely Hx of Poliomyelitis  Likely  recurrent lumbar radiculopathy, non-specific and not active    The patient's general conditioning with PT has led to major improvement in his waking and fatigability. At this time, he doesn't have pain or weakness to report that would merit further imaging or treatment (such as MAYNOR, or surgical intervention).  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  We discussed that he may be prone to an acute lumbar radiculopathy if a fall or trauma occurs, and that he could contact me at any time if new symptoms develop. Otherwise, he was to follow up with his PCP moving forward.     Plan:  Follow up in Neurology clinic  as needed should new concerns arise.    ILIR Law D.O.   of Neurology    Total time today (30 min) in this patient encounter was spent on pre-charting, counseling and/or coordination of care.       Virtual Visit Details    Type of service:  Video Visit   Video Start Time:  0958  Video End Time:10:19 AM    Originating Location (pt. Location): Home    Distant Location (provider location):  On-site  Platform used for Video Visit: [employer]      Again, thank you for allowing me to participate in the care of your patient.      Sincerely,    Rashad Law, DO

## 2025-06-15 ENCOUNTER — HEALTH MAINTENANCE LETTER (OUTPATIENT)
Age: 80
End: 2025-06-15

## 2025-07-27 NOTE — PROGRESS NOTES
HPI:    He continues to exercise and walks up to 5 miles several times a week. He has a new L thigh skin lesion for a month or so. It is getting a little larger. Otherwise, no additional HEENT, cardiopulmonary, abdominal, , neurological, systemic, psychiatric, lymphatic, endocrine, vascular complaints.     Past Medical History:   Diagnosis Date    Atrial fibrillation (H)     Coronary artery disease     Hyperlipidemia     Hypertension     Thyroid disease      Past Surgical History:   Procedure Laterality Date    HERNIA REPAIR, INGUINAL RT/LT      OTHER SURGICAL HISTORY      upper and lower partial plate     PE:    Vitals noted, gen nad, cooperative, alert, neck supple, lungs with good air movement, RRR, S1, S2, no MRG, abdomen no acute findings. Grossly normal neurological exam. L thigh medical skin lesion, papilloma     Results for orders placed or performed in visit on 07/29/25   CBC with platelets and differential     Status: None (In process)    Narrative    The following orders were created for panel order CBC with platelets and differential.  Procedure                               Abnormality         Status                     ---------                               -----------         ------                     CBC with platelets and ...[[id number]]                      In process                   Please view results for these tests on the individual orders.       A/P:     1.  Immunizations; Moderna COVID vaccine x 7, Pfizer COVID x 1.  Prevnar 13. done 8/2/2017, Tdap 10/28/2014. He has completed [facility] Shinrix Zoster vaccine series.   Pneumococcal 23 done 10/28/2014. Prevnar 20 vaccine done 10/1/2022. RSV vaccine done 9/26/2023.   2. On Eliquis for afib and off both  Metoprolol and Sotalol . Cardiology visit 10/28/2021 with MsTerence Eva. He saw Ms. Daley 10/11/2022. Resting echo 4/27/2023 and he had a cardiology appt. With Dr. Shane 12/4/2024.  He had a Zio patch done 12/26/2024  3. On thyroid replacement;  TSH on 3.10 on 11/11/2024.   4. Increased lipids on Rosuvastatin. Lipid panel 6/7/2024; TG's 118, LDL 49 and HDL 48  5. PSA 3.84 on 6/7/2024.    6. Dermatology skin check 10/30/2024 with  Dr. English. Next appt. 12/4/2025 with Dr. To. L thigh skin lesion. Placed dermatology referral.   7. Colonoscopy 7/1/2020.   8. Repeat CBC 6/7/2024 normal Hgb and platelets.   9. He had a normal abdominal ultrasound 12/20/2023  10. Family h/o lung cancer and he has a smoking history. He gets Chest CT scan at [facility]  11. He had an EMG for balance and walking issues 11/27/2024. He was seen in Neurology Dr. Law 2/4/2025. He had a lumbar spine MRI 7/10/2024.    12. Resting echo 4/27/2023 with 4.1 cm aorta. Ordered future resting echo 7/29/2025     40 minutes spent on the date of the encounter doing chart review, history and exam, documentation and further activities as noted above

## 2025-07-29 ENCOUNTER — LAB (OUTPATIENT)
Dept: LAB | Facility: CLINIC | Age: 80
End: 2025-07-29
Payer: COMMERCIAL

## 2025-07-29 ENCOUNTER — OFFICE VISIT (OUTPATIENT)
Dept: INTERNAL MEDICINE | Facility: CLINIC | Age: 80
End: 2025-07-29
Payer: COMMERCIAL

## 2025-07-29 VITALS
HEIGHT: 69 IN | HEART RATE: 76 BPM | DIASTOLIC BLOOD PRESSURE: 82 MMHG | BODY MASS INDEX: 32.22 KG/M2 | WEIGHT: 217.5 LBS | OXYGEN SATURATION: 94 % | RESPIRATION RATE: 18 BRPM | SYSTOLIC BLOOD PRESSURE: 128 MMHG | TEMPERATURE: 97.3 F

## 2025-07-29 DIAGNOSIS — Z12.5 SCREENING FOR PROSTATE CANCER: ICD-10-CM

## 2025-07-29 DIAGNOSIS — R94.6 THYROID FUNCTION TEST ABNORMAL: ICD-10-CM

## 2025-07-29 DIAGNOSIS — L98.9 SKIN LESION: ICD-10-CM

## 2025-07-29 DIAGNOSIS — I10 BENIGN ESSENTIAL HYPERTENSION: ICD-10-CM

## 2025-07-29 DIAGNOSIS — E78.00 HIGH BLOOD CHOLESTEROL: ICD-10-CM

## 2025-07-29 DIAGNOSIS — E78.00 HIGH BLOOD CHOLESTEROL: Primary | ICD-10-CM

## 2025-07-29 LAB
ALBUMIN SERPL BCG-MCNC: 4.5 G/DL (ref 3.5–5.2)
ALP SERPL-CCNC: 43 U/L (ref 40–150)
ALT SERPL W P-5'-P-CCNC: 33 U/L (ref 0–70)
ANION GAP SERPL CALCULATED.3IONS-SCNC: 9 MMOL/L (ref 7–15)
AST SERPL W P-5'-P-CCNC: 33 U/L (ref 0–45)
BASOPHILS # BLD AUTO: 0 10E3/UL (ref 0–0.2)
BASOPHILS NFR BLD AUTO: 0 %
BILIRUB SERPL-MCNC: 0.4 MG/DL
BUN SERPL-MCNC: 24.4 MG/DL (ref 8–23)
CALCIUM SERPL-MCNC: 9.8 MG/DL (ref 8.8–10.4)
CHLORIDE SERPL-SCNC: 107 MMOL/L (ref 98–107)
CHOLEST SERPL-MCNC: 120 MG/DL
CREAT SERPL-MCNC: 1.02 MG/DL (ref 0.67–1.17)
EGFRCR SERPLBLD CKD-EPI 2021: 76 ML/MIN/1.73M2
EOSINOPHIL # BLD AUTO: 0.1 10E3/UL (ref 0–0.7)
EOSINOPHIL NFR BLD AUTO: 1 %
ERYTHROCYTE [DISTWIDTH] IN BLOOD BY AUTOMATED COUNT: 13.1 % (ref 10–15)
FASTING STATUS PATIENT QL REPORTED: YES
FASTING STATUS PATIENT QL REPORTED: YES
GLUCOSE SERPL-MCNC: 109 MG/DL (ref 70–99)
HCO3 SERPL-SCNC: 25 MMOL/L (ref 22–29)
HCT VFR BLD AUTO: 40.6 % (ref 40–53)
HDLC SERPL-MCNC: 54 MG/DL
HGB BLD-MCNC: 13.7 G/DL (ref 13.3–17.7)
IMM GRANULOCYTES # BLD: 0 10E3/UL
IMM GRANULOCYTES NFR BLD: 1 %
LDLC SERPL CALC-MCNC: 56 MG/DL
LYMPHOCYTES # BLD AUTO: 1.6 10E3/UL (ref 0.8–5.3)
LYMPHOCYTES NFR BLD AUTO: 32 %
MCH RBC QN AUTO: 29.9 PG (ref 26.5–33)
MCHC RBC AUTO-ENTMCNC: 33.7 G/DL (ref 31.5–36.5)
MCV RBC AUTO: 89 FL (ref 78–100)
MONOCYTES # BLD AUTO: 0.6 10E3/UL (ref 0–1.3)
MONOCYTES NFR BLD AUTO: 11 %
NEUTROPHILS # BLD AUTO: 2.8 10E3/UL (ref 1.6–8.3)
NEUTROPHILS NFR BLD AUTO: 55 %
NONHDLC SERPL-MCNC: 66 MG/DL
NRBC # BLD AUTO: 0 10E3/UL
NRBC BLD AUTO-RTO: 0 /100
PLATELET # BLD AUTO: 145 10E3/UL (ref 150–450)
POTASSIUM SERPL-SCNC: 5.8 MMOL/L (ref 3.4–5.3)
PROT SERPL-MCNC: 7.5 G/DL (ref 6.4–8.3)
PSA SERPL DL<=0.01 NG/ML-MCNC: 7.88 NG/ML (ref 0–6.5)
RBC # BLD AUTO: 4.58 10E6/UL (ref 4.4–5.9)
SODIUM SERPL-SCNC: 141 MMOL/L (ref 135–145)
TRIGL SERPL-MCNC: 52 MG/DL
TSH SERPL DL<=0.005 MIU/L-ACNC: 2.69 UIU/ML (ref 0.3–4.2)
WBC # BLD AUTO: 5 10E3/UL (ref 4–11)

## 2025-07-29 PROCEDURE — 85025 COMPLETE CBC W/AUTO DIFF WBC: CPT | Performed by: PATHOLOGY

## 2025-07-29 PROCEDURE — 3079F DIAST BP 80-89 MM HG: CPT | Performed by: INTERNAL MEDICINE

## 2025-07-29 PROCEDURE — 84443 ASSAY THYROID STIM HORMONE: CPT | Performed by: PATHOLOGY

## 2025-07-29 PROCEDURE — 80053 COMPREHEN METABOLIC PANEL: CPT | Performed by: PATHOLOGY

## 2025-07-29 PROCEDURE — 36415 COLL VENOUS BLD VENIPUNCTURE: CPT | Performed by: PATHOLOGY

## 2025-07-29 PROCEDURE — 99215 OFFICE O/P EST HI 40 MIN: CPT | Performed by: INTERNAL MEDICINE

## 2025-07-29 PROCEDURE — 80061 LIPID PANEL: CPT | Performed by: PATHOLOGY

## 2025-07-29 PROCEDURE — G0103 PSA SCREENING: HCPCS | Performed by: PATHOLOGY

## 2025-07-29 PROCEDURE — 3074F SYST BP LT 130 MM HG: CPT | Performed by: INTERNAL MEDICINE

## 2025-07-30 ENCOUNTER — PATIENT OUTREACH (OUTPATIENT)
Dept: CARE COORDINATION | Facility: CLINIC | Age: 80
End: 2025-07-30
Payer: COMMERCIAL

## 2025-07-31 ENCOUNTER — HOSPITAL ENCOUNTER (OUTPATIENT)
Dept: CARDIOLOGY | Facility: CLINIC | Age: 80
End: 2025-07-31
Attending: INTERNAL MEDICINE
Payer: COMMERCIAL

## 2025-07-31 DIAGNOSIS — Z12.5 SCREENING FOR PROSTATE CANCER: ICD-10-CM

## 2025-07-31 DIAGNOSIS — I10 BENIGN ESSENTIAL HYPERTENSION: ICD-10-CM

## 2025-07-31 DIAGNOSIS — E78.00 HIGH BLOOD CHOLESTEROL: ICD-10-CM

## 2025-07-31 DIAGNOSIS — L98.9 SKIN LESION: ICD-10-CM

## 2025-07-31 DIAGNOSIS — R94.6 THYROID FUNCTION TEST ABNORMAL: ICD-10-CM

## 2025-07-31 LAB — LVEF ECHO: NORMAL

## 2025-07-31 PROCEDURE — 93306 TTE W/DOPPLER COMPLETE: CPT
